# Patient Record
Sex: MALE | Race: WHITE | NOT HISPANIC OR LATINO | Employment: OTHER | URBAN - METROPOLITAN AREA
[De-identification: names, ages, dates, MRNs, and addresses within clinical notes are randomized per-mention and may not be internally consistent; named-entity substitution may affect disease eponyms.]

---

## 2017-01-11 ENCOUNTER — TELEPHONE (OUTPATIENT)
Dept: GASTROENTEROLOGY | Facility: CLINIC | Age: 72
End: 2017-01-11

## 2017-01-11 RX ORDER — POLYETHYLENE GLYCOL 3350, SODIUM CHLORIDE, SODIUM BICARBONATE, POTASSIUM CHLORIDE 420; 11.2; 5.72; 1.48 G/4L; G/4L; G/4L; G/4L
POWDER, FOR SOLUTION ORAL
Qty: 4000 ML | Refills: 0 | Status: SHIPPED | OUTPATIENT
Start: 2017-01-11 | End: 2017-01-23 | Stop reason: SDUPTHER

## 2017-01-11 RX ORDER — SODIUM, POTASSIUM,MAG SULFATES 17.5-3.13G
SOLUTION, RECONSTITUTED, ORAL ORAL
Qty: 354 ML | Refills: 0 | OUTPATIENT
Start: 2017-01-11

## 2017-01-17 ENCOUNTER — TELEPHONE (OUTPATIENT)
Dept: GASTROENTEROLOGY | Facility: CLINIC | Age: 72
End: 2017-01-17

## 2017-01-17 NOTE — TELEPHONE ENCOUNTER
----- Message from Guerda Potts sent at 1/16/2017  2:33 PM CST -----  Contact: Pt.  Needs to cancel or reschedule procedure on 1/26/17 at 10:30 a.m.  Says he needs to see his doctors before doing this.  Please call back at [phone] (Shubham Ann- cousin).  Pt is hearing impaired and cousin will assist.

## 2017-01-18 ENCOUNTER — PROCEDURE VISIT (OUTPATIENT)
Dept: OPHTHALMOLOGY | Facility: CLINIC | Age: 72
End: 2017-01-18
Payer: MEDICARE

## 2017-01-18 DIAGNOSIS — E11.3513 TYPE 2 DIABETES MELLITUS WITH BOTH EYES AFFECTED BY PROLIFERATIVE RETINOPATHY AND MACULAR EDEMA, WITH LONG-TERM CURRENT USE OF INSULIN: Primary | ICD-10-CM

## 2017-01-18 DIAGNOSIS — Z79.4 TYPE 2 DIABETES MELLITUS WITH BOTH EYES AFFECTED BY PROLIFERATIVE RETINOPATHY AND MACULAR EDEMA, WITH LONG-TERM CURRENT USE OF INSULIN: Primary | ICD-10-CM

## 2017-01-18 PROCEDURE — 99499 UNLISTED E&M SERVICE: CPT | Mod: S$PBB,,, | Performed by: OPHTHALMOLOGY

## 2017-01-18 PROCEDURE — 67028 INJECTION EYE DRUG: CPT | Mod: S$PBB,LT,, | Performed by: OPHTHALMOLOGY

## 2017-01-18 PROCEDURE — 67028 INJECTION EYE DRUG: CPT | Mod: PBBFAC,PO,LT | Performed by: OPHTHALMOLOGY

## 2017-01-18 RX ORDER — NEOMYCIN SULFATE, POLYMYXIN B SULFATE AND DEXAMETHASONE 3.5; 10000; 1 MG/ML; [USP'U]/ML; MG/ML
1 SUSPENSION/ DROPS OPHTHALMIC 2 TIMES DAILY
Qty: 5 ML | Refills: 0 | Status: SHIPPED | OUTPATIENT
Start: 2017-01-18 | End: 2017-01-28

## 2017-01-18 RX ADMIN — AFLIBERCEPT 2 MG: 40 INJECTION, SOLUTION INTRAVITREAL at 10:01

## 2017-01-18 NOTE — PROGRESS NOTES
===============================  Mono Bergman,   71 y.o. male   Last visit [facility]: :12/1/2016   Last visit eye dept[facility] 12/1/2016  [facility]:  [facility] distance visual acuity was 20/25 in the right eye and 20/200 in the left eye.   Not recorded        Wearing Rx     Wearing Rx      Sphere Cylinder Axis Add   Right [place] +0.50 025 +2.50   Left [place] +0.50 030 +2.50       Type:  Bifocal               Not recorded        Chief Complaint   Patient presents with    PDR/ME     Ophthalmic Medications     Ophthalmic - Anti-inflammatory, Glucocorticoids Start End    prednisoLONE sodium phosphate (INFLAMASE FORTE) 1 % Drop 5/2/2016     Class: Historical Med         HPI     1.) + DM SINCE 1997  2.) + PDR OU/ CSME OS  Bilateral PRP  PPV OS with Dr. 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 TEARS BID OS  REFRESH GEL BID OS   OMEGA E BID P.O.    EYLEA OS 11/4/16 12/1/16       Last edited by Laya Carvalho on 1/18/2017  8:09 AM.   Read Studies: y  Vitalsy    ________________  1/18/2017  1. Type 2 diabetes mellitus with both eyes affected by proliferative retinopathy and macular edema, with long-term current use of insulin      .  Os can sometimes read TV cations'  Co instantaneous  Sharp pain with tearing  Ad mtx bid in and on ou x 10 days  Oct os no better - worse os.ccavg  ciloxan Antibiotic Drops to be used 4 times daily for 4 days  Watch od dme -   today#4 os   1/18/2017  Diagnosis :  Os dme  Today:   Eylea (afibercept) 2 mg/0.05 ml Intravitreal Injection , OS   Follow up: rtc 1 mo        Call 24/7 for any worsening of vision. Check  OU QD. Gave my home phone number.      Procedure  Note:   OS}  Eylea (afibercept) 2 mg/0.05 ml Intravitreal Injection    I have explained the Risks, Benefits and Alternatives of the procedure in detail.  The patient voices  understanding and all questions have been answered.  The patient agrees to proceed as discussed.  LIDOCAINE 2%  subconj bleb  was used for anesthesia.  Topical betadine was used for antisepsis.  0.05 cc was  injected 3.7 mm from corneal limbus in the inferotemporal quadrant.  Following injection the IOP was less than thirty (<30) by tonopen.  The eye was then thoroughly irrigated with BSS.  Patient tolerated procedure well.  No complications were observed.  The Patient was educated that mild irritation tonight was normal secondary to topical antispsis use.  Pt was advised to call at any time day or night for pain, redness, or any decline in vision. I gave the patient my home number as well as the clinic on call number. Daily visual checks and Amsler grid testing were reviewed.    VALERIANO Tucker MD  Procedure ordered: y  Consent: y  Pre auth: y  MAR:y  Opnote: y  Charge capture:y  Sided procedure note: y         ===========================

## 2017-01-18 NOTE — MR AVS SNAPSHOT
[facility]  [address] [place] [address]  Phone: [phone]  Fax: [phone]                  Mono Bergman   2017 8:15 AM   Procedure visit    Description:  Male : 1945   Provider:  VALERIANO Tucker MD   Department:  [facility]           Reason for Visit     PDR/ME           Diagnoses this Visit        Comments    Type 2 diabetes mellitus with both eyes affected by proliferative retinopathy and macular edema, with long-term current use of insulin    -  Primary            To Do List           Goals (5 Years of Data)     HEMOGLOBIN A1C < 7.0     LDL CHOLESTEROL < 80       Follow-Up and Disposition     Return in about 1 month (around 2017).       These Medications        Disp Refills Start End    neomycin-polymyxin-dexamethasone (MAXITROL) 3.5mg/mL-10,000 unit/mL-0.1 % DrpS 5 mL 0 2017    Place 1 drop into both eyes 2 (two) times daily. Use 1 drop twice a day in and on lids for 10 days - Both Eyes    Pharmacy: [facility] [place] - [address] Ph #: [phone]         [facility]     [facility] Nurse Care Line -  Assistance  Registered nurses in the [facility] provide clinical advisement, health education, appointment booking, and other advisory services.  Call for this free service at [phone].             Medications           Message regarding Medications     Verify the changes and/or additions to your medication regime listed below are the same as discussed with your clinician today.  If any of these changes or additions are incorrect, please notify your healthcare provider.        START taking these NEW medications        Refills    neomycin-polymyxin-dexamethasone (MAXITROL) 3.5mg/mL-10,000 unit/mL-0.1 % DrpS 0    Sig: Place 1 drop into both eyes 2 (two) times daily. Use 1 drop twice a day in and on lids for 10 days    Class: Normal    Route: Both Eyes     "  These medications were administered today        Dose Freq    aflibercept Soln 2 mg 2 mg Clinic/HOD 1 time    Si.05 mLs (2 mg total) by Intravitreal route one time.    Class: Normal    Route: Intravitreal           Verify that the below list of medications is an accurate representation of the medications you are currently taking.  If none reported, the list may be blank. If incorrect, please contact your healthcare provider. Carry this list with you in case of emergency.           Current Medications     ARGININE, L-ARGININE, ORAL Take by mouth.    aspirin 81 MG Chew Take 81 mg by mouth.    aspirin 81 mg Tab Take 81 mg by mouth before breakfast.    atorvastatin (LIPITOR) 80 MG tablet Take 80 mg by mouth once daily.    biotin 5 mg Cap Take 5 mg by mouth before breakfast.    ciprofloxacin HCl (CILOXAN) 0.3 % ophthalmic solution     GAS-X EXTRA STRENGTH 125 mg Cap capsule     insulin needles, disposable, (SURE-FINE PEN NEEDLES) 31 x 3/16 " Ndle Use twice daily to inject insulin    L.acidophilus-B.bifidum&longum 150 mg (3 billion cell) Chew Take 1 tablet by mouth.    lisinopril (PRINIVIL,ZESTRIL) 2.5 MG tablet Take 2.5 mg by mouth once daily.    midodrine (PROAMATINE) 5 MG Tab     moxifloxacin (VIGAMOX) 0.5 % ophthalmic solution Place 1 drop into the right eye every hour.    ondansetron (ZOFRAN) 4 MG tablet Take 4 mg by mouth.    ondansetron (ZOFRAN) 4 MG tablet Take 4 mg by mouth.    pantoprazole (PROTONIX) 20 MG tablet Take by mouth.    pantoprazole (PROTONIX) 20 MG tablet Take by mouth.    peg-electrolyte soln (PEG-3350 WITH FLAVOR PACKS) 420 gram SolR Use as directed    pen needle, diabetic (BD INSULIN PEN NEEDLE UF MINI) 31 gauge x 3/16" Ndle     phenazopyridine (PYRIDIUM) 200 MG tablet     prednisoLONE sodium phosphate (INFLAMASE FORTE) 1 % Drop     rosuvastatin (CRESTOR) 20 MG tablet Take 20 mg by mouth once daily.    simethicone 80 mg Tab Take 80 mg by mouth.    sucralfate (CARAFATE) 100 mg/mL suspension " 500 mg.    sucralfate (CARAFATE) 100 mg/mL suspension Take 500 mg by mouth.    white petrolatum-mineral oil 57.3-42.5% (REFRESH P.M.) 57.3-42.5 % Oint Place into both eyes every evening.    acetaZOLAMIDE (DIAMOX SEQUELS) 500 mg CpSR Take 1 capsule (500 mg total) by mouth 2 (two) times daily.    insulin aspart protamine-insulin aspart (NOVOLOG MIX 70-30 FLEXPEN) 100 unit/mL (70-30) InPn Inject 50 Units into the skin 2 (two) times daily before meals.    neomycin-polymyxin-dexamethasone (MAXITROL) 3.5mg/mL-10,000 unit/mL-0.1 % DrpS Place 1 drop into both eyes 2 (two) times daily. Use 1 drop twice a day in and on lids for 10 days           Clinical Reference Information           Allergies as of 1/18/2017     No Known Allergies      Immunizations Administered on Date of Encounter - 1/18/2017     None      Orders Placed During Today's Visit      Normal Orders This Visit    Prior Authorization Order       Administrations This Visit     aflibercept Soln 2 mg     Admin Date Action Dose Route Administered By             01/18/2017 Given 2 mg Intravitreal VALERIANO Tucker MD

## 2017-01-23 ENCOUNTER — TELEPHONE (OUTPATIENT)
Dept: GASTROENTEROLOGY | Facility: CLINIC | Age: 72
End: 2017-01-23

## 2017-01-23 ENCOUNTER — OFFICE VISIT (OUTPATIENT)
Dept: GASTROENTEROLOGY | Facility: CLINIC | Age: 72
End: 2017-01-23
Payer: MEDICARE

## 2017-01-23 VITALS
HEART RATE: 78 BPM | DIASTOLIC BLOOD PRESSURE: 69 MMHG | BODY MASS INDEX: 33.39 KG/M2 | WEIGHT: 212.75 LBS | HEIGHT: 67 IN | SYSTOLIC BLOOD PRESSURE: 120 MMHG

## 2017-01-23 DIAGNOSIS — K59.09 CONSTIPATION, CHRONIC: ICD-10-CM

## 2017-01-23 DIAGNOSIS — Z86.010 HISTORY OF COLON POLYPS: Primary | ICD-10-CM

## 2017-01-23 PROCEDURE — 99215 OFFICE O/P EST HI 40 MIN: CPT | Mod: PBBFAC | Performed by: PHYSICIAN ASSISTANT

## 2017-01-23 PROCEDURE — 99203 OFFICE O/P NEW LOW 30 MIN: CPT | Mod: S$PBB,,, | Performed by: PHYSICIAN ASSISTANT

## 2017-01-23 PROCEDURE — 99999 PR PBB SHADOW E&M-EST. PATIENT-LVL V: CPT | Mod: PBBFAC,,, | Performed by: PHYSICIAN ASSISTANT

## 2017-01-23 RX ORDER — POLYETHYLENE GLYCOL 3350 17 G/17G
17 POWDER, FOR SOLUTION ORAL DAILY
Qty: 510 G | Refills: 3 | Status: SHIPPED | OUTPATIENT
Start: 2017-01-23 | End: 2019-09-12 | Stop reason: SDUPTHER

## 2017-01-23 RX ORDER — POLYETHYLENE GLYCOL 3350, SODIUM CHLORIDE, SODIUM BICARBONATE, POTASSIUM CHLORIDE 420; 11.2; 5.72; 1.48 G/4L; G/4L; G/4L; G/4L
POWDER, FOR SOLUTION ORAL
Qty: 4000 ML | Refills: 0 | Status: ON HOLD | OUTPATIENT
Start: 2017-01-23 | End: 2017-02-03 | Stop reason: CLARIF

## 2017-01-23 NOTE — MR AVS SNAPSHOT
[facility]  [address] [facility] [address]  Phone: [phone]  Fax: [phone]                  Mono Bergman   2017 9:30 AM   Office Visit    Description:  Male : 1945   Provider:  Wes Caicedo PA-C   Department:  [facility]           Reason for Visit     Colonoscopy           Diagnoses this Visit        Comments    History of colon polyps    -  Primary     Constipation, chronic                To Do List           Future Appointments        Provider Department Dept Phone    2017 10:15 AM VALERIANO Tucker MD [facility] - Ophthalmology [phone]      Goals (5 Years of Data)     HEMOGLOBIN A1C < 7.0     LDL CHOLESTEROL < 80       Follow-Up and Disposition     Return in about 4 weeks (around 2017).       These Medications        Disp Refills Start End    peg-electrolyte soln (PEG-3350 WITH FLAVOR PACKS) 420 gram SolR 4000 mL 0 2017     Use as directed    Pharmacy: [facility]/pharmacy #[id number] - [place] - [address] [facility] Ph #: [phone]       polyethylene glycol (GLYCOLAX) 17 gram/dose powder 510 g 3 2017     Take 17 g by mouth once daily. - Oral    Pharmacy: [facility]/pharmacy #[id number] - [place] - [address] [facility] Ph #: [phone]         [facility]     [facility] Care Line - / Assistance  Registered nurses in the [facility] Center provide clinical advisement, health education, appointment booking, and other advisory services.  Call for this free service at [phone].             Medications           Message regarding Medications     Verify the changes and/or additions to your medication regime listed below are the same as discussed with your clinician today.  If any of these changes or additions are incorrect, please notify your healthcare provider.        START taking these NEW medications        Refills    polyethylene glycol (GLYCOLAX) 17  "gram/dose powder 3    Sig: Take 17 g by mouth once daily.    Class: Print    Route: Oral           Verify that the below list of medications is an accurate representation of the medications you are currently taking.  If none reported, the list may be blank. If incorrect, please contact your healthcare provider. Carry this list with you in case of emergency.           Current Medications     ARGININE, L-ARGININE, ORAL Take by mouth.    aspirin 81 MG Chew Take 81 mg by mouth.    aspirin 81 mg Tab Take 81 mg by mouth before breakfast.    atorvastatin (LIPITOR) 80 MG tablet Take 80 mg by mouth once daily.    biotin 5 mg Cap Take 5 mg by mouth before breakfast.    ciprofloxacin HCl (CILOXAN) 0.3 % ophthalmic solution     GAS-X EXTRA STRENGTH 125 mg Cap capsule     insulin needles, disposable, (SURE-FINE PEN NEEDLES) 31 x 3/16 " Ndle Use twice daily to inject insulin    L.acidophilus-B.bifidum&longum 150 mg (3 billion cell) Chew Take 1 tablet by mouth.    lisinopril (PRINIVIL,ZESTRIL) 2.5 MG tablet Take 2.5 mg by mouth once daily.    midodrine (PROAMATINE) 5 MG Tab     moxifloxacin (VIGAMOX) 0.5 % ophthalmic solution Place 1 drop into the right eye every hour.    neomycin-polymyxin-dexamethasone (MAXITROL) 3.5mg/mL-10,000 unit/mL-0.1 % DrpS Place 1 drop into both eyes 2 (two) times daily. Use 1 drop twice a day in and on lids for 10 days    ondansetron (ZOFRAN) 4 MG tablet Take 4 mg by mouth.    ondansetron (ZOFRAN) 4 MG tablet Take 4 mg by mouth.    pantoprazole (PROTONIX) 20 MG tablet Take by mouth.    pantoprazole (PROTONIX) 20 MG tablet Take by mouth.    peg-electrolyte soln (PEG-3350 WITH FLAVOR PACKS) 420 gram SolR Use as directed    pen needle, diabetic (BD INSULIN PEN NEEDLE UF MINI) 31 gauge x 3/16" Ndle     phenazopyridine (PYRIDIUM) 200 MG tablet     prednisoLONE sodium phosphate (INFLAMASE FORTE) 1 % Drop     rosuvastatin (CRESTOR) 20 MG tablet Take 20 mg by mouth once daily.    simethicone 80 mg Tab Take 80 " "mg by mouth.    sucralfate (CARAFATE) 100 mg/mL suspension 500 mg.    white petrolatum-mineral oil 57.3-42.5% (REFRESH P.M.) 57.3-42.5 % Oint Place into both eyes every evening.    acetaZOLAMIDE (DIAMOX SEQUELS) 500 mg CpSR Take 1 capsule (500 mg total) by mouth 2 (two) times daily.    insulin aspart protamine-insulin aspart (NOVOLOG MIX 70-30 FLEXPEN) 100 unit/mL (70-30) InPn Inject 50 Units into the skin 2 (two) times daily before meals.    polyethylene glycol (GLYCOLAX) 17 gram/dose powder Take 17 g by mouth once daily.    sucralfate (CARAFATE) 100 mg/mL suspension Take 500 mg by mouth.           Clinical Reference Information           Vital Signs - Last Recorded  Most recent update: 1/23/2017  9:42 AM by Rhonda García MA    BP Pulse Ht Wt BMI    120/69 78 5' 7" (1.702 m) 96.5 kg (212 lb 11.9 oz) 33.32 kg/m2      Blood Pressure          Most Recent Value    BP  120/69      Allergies as of 1/23/2017     No Known Allergies      Immunizations Administered on Date of Encounter - 1/23/2017     None      Instructions    Start Miralax once daily to help improve bowel habits.          "

## 2017-01-23 NOTE — PROGRESS NOTES
Subjective:       Patient ID: Mono Bergman is a 71 y.o. male.    Chief Complaint: Colonoscopy    HPI   The patient presents to the GI clinic today for [facility] The patient had a colonoscopy with polypectomy in 2013. A three year repeat was recommended. The patient reports problems with constipation. He has a BM every 3-4 days. When he finally goes, it is like a plug followed by loose stools. He denies hematochezia or melena. He doesn't take anything to help his bowel habits. He denies weight loss, change in appetite, abdominal pain, nausea, vomiting, heartburn or dysphagia. He denies a family history of colon cancer.    Patient reported feeling like his BG level was low. We checked it and it was 69. He was given 4 ounces of juice. His repeat BG lever about 20 minutes later was 198.     Review of Systems   Constitutional: Negative for appetite change, fatigue and fever.   HENT: Positive for hearing loss. Negative for sore throat.    Eyes: Positive for visual disturbance.   Respiratory: Positive for cough. Negative for choking and shortness of breath.    Cardiovascular: Negative for chest pain and palpitations.   Gastrointestinal:        As per HPI.   Genitourinary: Positive for frequency. Negative for difficulty urinating, dysuria and hematuria.   Musculoskeletal: Negative for arthralgias and back pain.   Skin: Negative for color change and rash.   Neurological: Positive for numbness. Negative for dizziness, seizures, syncope, weakness and headaches.   Hematological: Does not bruise/bleed easily.   Psychiatric/Behavioral: The patient is not nervous/anxious.        Objective:      Physical Exam   Constitutional: He is oriented to person, place, and time. He appears well-developed and well-nourished.   HENT:   Head: Normocephalic and atraumatic.   Eyes: EOM are normal.   Neck: Normal range of motion. Neck supple.   Cardiovascular: Normal rate, regular rhythm and normal  heart sounds.    No murmur heard.  Pulmonary/Chest: Effort normal and breath sounds normal. No respiratory distress. He has no wheezes.   Abdominal: Soft. Bowel sounds are normal. He exhibits no distension and no mass. There is no hepatomegaly. There is no tenderness.   Musculoskeletal: He exhibits no edema.   Neurological: He is alert and oriented to person, place, and time. No cranial nerve deficit. Gait abnormal.   Skin: Skin is warm and dry. No rash noted.   Psychiatric: He has a normal mood and affect.       Assessment:       1. History of colon polyps    2. Constipation, chronic        Plan:       1. Colonoscopy.   The risks, benefits, alternatives and possible complications of the above procedure(s) were discussed with the patient to include but not limited to infection, bleeding, heart complications, respiratory complications, or perforation which may require surgical intervention. The patient's questions were answered. The patient verbally reported understanding of the discussion.  2. A Nulytely prescription was printed for the patient.   3. I recommend starting Miralax once daily for constipation.   4. Further recommendations after above. Follow up with PCP as previously scheduled.     Thank you for the opportunity to participate in the care of this patient. This consult was designated to me by my supervising physician. A report will be sent to the referring provider.   Wes Caicedo PA-C.

## 2017-01-23 NOTE — TELEPHONE ENCOUNTER
Went in to room patient and he explained that i needed to get hi some juice because his blood sugar was 69, he was shakey and sweaty. We gave him 4 oz of apple juice and rechecked his blood sugar an it was 198.

## 2017-02-03 ENCOUNTER — SURGERY (OUTPATIENT)
Age: 72
End: 2017-02-03

## 2017-02-03 ENCOUNTER — ANESTHESIA (OUTPATIENT)
Dept: ENDOSCOPY | Facility: HOSPITAL | Age: 72
End: 2017-02-03
Payer: COMMERCIAL

## 2017-02-03 ENCOUNTER — ANESTHESIA EVENT (OUTPATIENT)
Dept: ENDOSCOPY | Facility: HOSPITAL | Age: 72
End: 2017-02-03
Payer: COMMERCIAL

## 2017-02-03 VITALS — RESPIRATION RATE: 23 BRPM

## 2017-02-03 PROBLEM — K63.5 COLON POLYP: Status: ACTIVE | Noted: 2017-02-03

## 2017-02-03 PROCEDURE — 25000003 PHARM REV CODE 250: Performed by: NURSE ANESTHETIST, CERTIFIED REGISTERED

## 2017-02-03 PROCEDURE — 63600175 PHARM REV CODE 636 W HCPCS: Performed by: NURSE ANESTHETIST, CERTIFIED REGISTERED

## 2017-02-03 RX ORDER — PROPOFOL 10 MG/ML
VIAL (ML) INTRAVENOUS
Status: DISCONTINUED | OUTPATIENT
Start: 2017-02-03 | End: 2017-02-03

## 2017-02-03 RX ORDER — LIDOCAINE HYDROCHLORIDE 10 MG/ML
INJECTION INFILTRATION; PERINEURAL
Status: DISCONTINUED | OUTPATIENT
Start: 2017-02-03 | End: 2017-02-03

## 2017-02-03 RX ADMIN — PROPOFOL 100 MG: 10 INJECTION, EMULSION INTRAVENOUS at 01:02

## 2017-02-03 RX ADMIN — PROPOFOL 50 MG: 10 INJECTION, EMULSION INTRAVENOUS at 01:02

## 2017-02-03 RX ADMIN — EPHEDRINE SULFATE 25 MG: 50 INJECTION, SOLUTION INTRAMUSCULAR; INTRAVENOUS; SUBCUTANEOUS at 01:02

## 2017-02-03 RX ADMIN — LIDOCAINE HYDROCHLORIDE 40 MG: 10 INJECTION, SOLUTION INFILTRATION; PERINEURAL at 01:02

## 2017-02-03 NOTE — ANESTHESIA RELEASE NOTE
"Anesthesia Release from PACU Note    Patient: Mono Bergman    Procedure(s) Performed: Procedure(s) (LRB):  COLONOSCOPY (N/A)    Anesthesia type: MAC    Post pain: Adequate analgesia    Post assessment: no apparent anesthetic complications    Last Vitals:   Visit Vitals    BP (!) 149/63    Pulse 98    Temp 37 °C (98.6 °F)    Resp 14    Ht 5' 8" (1.727 m)    Wt 95.7 kg (211 lb)    SpO2 98%    BMI 32.08 kg/m2       Post vital signs: stable    Level of consciousness: awake    Nausea/Vomiting: no nausea/no vomiting    Complications: none    Airway Patency: patent    Respiratory: unassisted    Cardiovascular: stable and blood pressure at baseline    Hydration: euvolemic  "

## 2017-02-03 NOTE — ANESTHESIA PREPROCEDURE EVALUATION
02/03/2017  Mono Bergman is a 71 y.o., male.    [facility] Anesthesia Evaluation    I have reviewed the Patient Summary Reports.    I have reviewed the Nursing Notes.   I have reviewed the Medications.     Review of Systems  Anesthesia Hx:  No problems with previous Anesthesia    Social:  Non-Smoker    Hematology/Oncology:  Hematology Normal   Oncology Normal     EENT/Dental:EENT/Dental Normal   Cardiovascular:   Hypertension, well controlled hyperlipidemia    Pulmonary:  Pulmonary Normal    Renal/:  Renal/ Normal     Hepatic/GI:   GERD, poorly controlled  Bowel Conditions:  Bowel Neoplasm:, Adenomatous Polyp    Musculoskeletal:  Musculoskeletal Normal    Neurological:  Neurology Normal    Endocrine:   Diabetes, well controlled, type 2  Diabetes , Complications include Diabetic Retinopathy  Metabolic Disorders, Obesity / BMI > 30  Dermatological:  Skin Normal    Psych:  Psychiatric Normal           Physical Exam  General:  Well nourished, Obesity    Airway/Jaw/Neck:  Airway Findings: Mouth Opening: Normal Tongue: Normal       Chest/Lungs:  Chest/Lungs Findings: Clear to auscultation     Heart/Vascular:  Heart Findings: Rate: Normal             Anesthesia Plan  Type of Anesthesia, risks & benefits discussed:  Anesthesia Type:  MAC  Patient's Preference:   Intra-op Monitoring Plan:   Intra-op Monitoring Plan Comments:   Post Op Pain Control Plan:   Post Op Pain Control Plan Comments:   Induction:   IV  Beta Blocker:  Patient is not currently on a Beta-Blocker (No further documentation required).       Informed Consent: Patient understands risks and agrees with Anesthesia plan.  Questions answered. Anesthesia consent signed with patient.  ASA Score: 2     Day of Surgery Review of History & Physical: I have interviewed and examined the patient. I have reviewed the patient's H&P dated:  There are no  significant changes.          Ready For Surgery From Anesthesia Perspective.

## 2017-02-03 NOTE — TRANSFER OF CARE
"Anesthesia Transfer of Care Note    Patient: Mono Bergman    Procedure(s) Performed: Procedure(s) (LRB):  COLONOSCOPY (N/A)    Patient location: PACU    Anesthesia Type: MAC    Transport from OR: Transported from OR on room air with adequate spontaneous ventilation    Post pain: adequate analgesia    Post assessment: no apparent anesthetic complications    Post vital signs: stable    Level of consciousness: awake    Nausea/Vomiting: no nausea/vomiting    Complications: none          Last vitals:   Visit Vitals    BP (!) 149/63    Pulse 98    Temp 37 °C (98.6 °F)    Resp 14    Ht 5' 8" (1.727 m)    Wt 95.7 kg (211 lb)    SpO2 98%    BMI 32.08 kg/m2     "

## 2017-02-03 NOTE — ANESTHESIA POSTPROCEDURE EVALUATION
"Anesthesia Post Evaluation    Patient: Mono Bergman    Procedure(s) Performed: Procedure(s) (LRB):  COLONOSCOPY (N/A)    Final Anesthesia Type: MAC  Patient location during evaluation: PACU  Patient participation: Yes- Able to Participate  Level of consciousness: awake and alert  Post-procedure vital signs: reviewed and stable  Pain management: adequate  Airway patency: patent  PONV status at discharge: No PONV  Anesthetic complications: no      Cardiovascular status: blood pressure returned to baseline  Respiratory status: unassisted  Hydration status: euvolemic  Follow-up not needed.        Visit Vitals    BP (!) 149/63    Pulse 98    Temp 37 °C (98.6 °F)    Resp 14    Ht 5' 8" (1.727 m)    Wt 95.7 kg (211 lb)    SpO2 98%    BMI 32.08 kg/m2       Pain/Jj Score: Pain Assessment Performed: Yes (2/3/2017  1:40 PM)  Presence of Pain: non-verbal indicators absent (2/3/2017  1:40 PM)  Jj Score: 9 (2/3/2017  1:40 PM)      "

## 2017-02-22 ENCOUNTER — PROCEDURE VISIT (OUTPATIENT)
Dept: OPHTHALMOLOGY | Facility: CLINIC | Age: 72
End: 2017-02-22
Payer: MEDICARE

## 2017-02-22 DIAGNOSIS — Z79.4 TYPE 2 DIABETES MELLITUS WITH BOTH EYES AFFECTED BY PROLIFERATIVE RETINOPATHY AND MACULAR EDEMA, WITH LONG-TERM CURRENT USE OF INSULIN: Primary | ICD-10-CM

## 2017-02-22 DIAGNOSIS — E11.3513 TYPE 2 DIABETES MELLITUS WITH BOTH EYES AFFECTED BY PROLIFERATIVE RETINOPATHY AND MACULAR EDEMA, WITH LONG-TERM CURRENT USE OF INSULIN: Primary | ICD-10-CM

## 2017-02-22 PROCEDURE — 99499 UNLISTED E&M SERVICE: CPT | Mod: S$PBB,,, | Performed by: OPHTHALMOLOGY

## 2017-02-22 PROCEDURE — 92134 CPTRZ OPH DX IMG PST SGM RTA: CPT | Mod: PBBFAC,PO | Performed by: OPHTHALMOLOGY

## 2017-02-22 PROCEDURE — 67028 INJECTION EYE DRUG: CPT | Mod: S$PBB,LT,, | Performed by: OPHTHALMOLOGY

## 2017-02-22 PROCEDURE — 67028 INJECTION EYE DRUG: CPT | Mod: PBBFAC,PO,LT | Performed by: OPHTHALMOLOGY

## 2017-02-22 RX ORDER — NEOMYCIN SULFATE, POLYMYXIN B SULFATE AND DEXAMETHASONE 3.5; 10000; 1 MG/ML; [USP'U]/ML; MG/ML
SUSPENSION/ DROPS OPHTHALMIC
COMMUNITY
Start: 2017-01-18 | End: 2017-04-05 | Stop reason: ALTCHOICE

## 2017-02-22 RX ORDER — INSULIN ASPART 100 [IU]/ML
INJECTION, SOLUTION INTRAVENOUS; SUBCUTANEOUS
COMMUNITY
Start: 2017-02-06 | End: 2019-10-03

## 2017-02-22 RX ADMIN — AFLIBERCEPT 2 MG: 40 INJECTION, SOLUTION INTRAVITREAL at 03:02

## 2017-02-22 NOTE — MR AVS SNAPSHOT
[facility] - Ophthalmology  [address] [facility] [place] [address]  Phone: [phone]  Fax: [phone]                  Mono Bergman   2017 10:15 AM   Procedure visit    Description:  Male : 1945   Provider:  VALERIANO Tucker MD   Department:  [facility] - Ophthalmology           Reason for Visit     PDR/ME           Diagnoses this Visit        Comments    Type 2 diabetes mellitus with both eyes affected by proliferative retinopathy and macular edema, with long-term current use of insulin    -  Primary            To Do List           Goals (5 Years of Data)     HEMOGLOBIN A1C < 7.0     LDL CHOLESTEROL < 80       Follow-Up and Disposition     Return in about 1 month (around 3/22/2017).      [facility]     [facility] -  Assistance  Registered nurses in the [facility] Call Center provide clinical advisement, health education, appointment booking, and other advisory services.  Call for this free service at [phone].             Medications           Message regarding Medications     Verify the changes and/or additions to your medication regime listed below are the same as discussed with your clinician today.  If any of these changes or additions are incorrect, please notify your healthcare provider.        These medications were administered today        Dose Freq    aflibercept Soln 2 mg 2 mg Clinic/HOD 1 time    Si.05 mLs (2 mg total) by Intravitreal route one time.    Class: Normal    Route: Intravitreal           Verify that the below list of medications is an accurate representation of the medications you are currently taking.  If none reported, the list may be blank. If incorrect, please contact your healthcare provider. Carry this list with you in case of emergency.           Current Medications     insulin aspart (NOVOLOG) 100 unit/mL InPn pen Inject 6 units into skin 5 to 10 minutes before breakfast    neomycin-polymyxin-dexamethasone (MAXITROL)  "3.5mg/mL-10,000 unit/mL-0.1 % DrpS     acetaZOLAMIDE (DIAMOX SEQUELS) 500 mg CpSR Take 1 capsule (500 mg total) by mouth 2 (two) times daily.    ARGININE, L-ARGININE, ORAL Take by mouth.    aspirin 81 mg Tab Take 81 mg by mouth before breakfast.    atorvastatin (LIPITOR) 80 MG tablet Take 80 mg by mouth once daily.    biotin 5 mg Cap Take 5 mg by mouth before breakfast.    ciprofloxacin HCl (CILOXAN) 0.3 % ophthalmic solution     ergocalciferol (VITAMIN D2) 50,000 unit Cap Take 50,000 Units by mouth every 7 days.    fish oil-omega-3 fatty acids 300-1,000 mg capsule Take 1 g by mouth 2 (two) times daily.    GAS-X EXTRA STRENGTH 125 mg Cap capsule     insulin aspart protamine-insulin aspart (NOVOLOG MIX 70-30 FLEXPEN) 100 unit/mL (70-30) InPn Inject 50 Units into the skin 2 (two) times daily before meals.    insulin glargine (LANTUS) 100 unit/mL injection Inject 80 Units into the skin every evening.    insulin needles, disposable, (SURE-FINE PEN NEEDLES) 31 x 3/16 " Ndle Use twice daily to inject insulin    L.acidophilus-B.bifidum&longum 150 mg (3 billion cell) Chew Take 1 tablet by mouth.    lisinopril (PRINIVIL,ZESTRIL) 2.5 MG tablet Take 2.5 mg by mouth once daily.    metformin (GLUCOPHAGE) 500 MG tablet Take 500 mg by mouth once.    midodrine (PROAMATINE) 5 MG Tab     moxifloxacin (VIGAMOX) 0.5 % ophthalmic solution Place 1 drop into the right eye every hour.    OMEGA-3S/DHA/EPA/FISH OIL (OMEGA 3 ORAL) Take by mouth.    ondansetron (ZOFRAN) 4 MG tablet Take 4 mg by mouth.    pantoprazole (PROTONIX) 20 MG tablet Take by mouth.    pen needle, diabetic (BD INSULIN PEN NEEDLE UF MINI) 31 gauge x 3/16" Ndle     polyethylene glycol (GLYCOLAX) 17 gram/dose powder Take 17 g by mouth once daily.    prednisoLONE sodium phosphate (INFLAMASE FORTE) 1 % Drop     rosuvastatin (CRESTOR) 20 MG tablet Take 20 mg by mouth once daily.    simethicone 80 mg Tab Take 80 mg by mouth.    SODIUM CL/POTASSIUM CHLORIDE (THERMOTABS ORAL) " Take 5 Units by mouth as needed.    white petrolatum-mineral oil 57.3-42.5% (REFRESH P.M.) 57.3-42.5 % Oint Place into both eyes every evening.           Clinical Reference Information           Allergies as of 2/22/2017     No Known Allergies      Immunizations Administered on Date of Encounter - 2/22/2017     None      Orders Placed During Today's Visit      Normal Orders This Visit    Posterior Segment OCT Retina-Both eyes     Prior Authorization Order       Language Assistance Services     ATTENTION: Language assistance services are available, free of charge. Please call [phone].      ATENCIÓN: Si habla iesha, tiene a grossman disposición servicios gratuitos de asistencia lingüística. Llame al [phone].     CHÚ Ý: N?u b?n nói Ti?ng Vi?t, có các d?ch v? h? tr? ngôn ng? mi?n phí dành cho b?n. G?i s? [phone].         [facility] - Ophthalmology complies with applicable Federal civil rights laws and does not discriminate on the basis of race, color, national origin, age, disability, or sex.

## 2017-02-22 NOTE — PROGRESS NOTES
"    ===============================  Mono Bergman,   71 y.o. male   Last visit [facility]: :1/18/2017   [place] [place]  [facility]  [place] visual acuity was 20/40 in the right eye and 20/200 in the left eye.   Not recorded         Not recorded         Not recorded        Chief Complaint   Patient presents with    PDR/ME     EYLEA OS     Ophthalmic Medications     Ophthalmic - Anti-inflammatory, Glucocorticoids Start End    prednisoLONE sodium phosphate (INFLAMASE FORTE) 1 % Drop 5/2/2016     Class: Historical Med         HPI     PDR/ME    Additional comments: EYLEA OS           Comments   1.) + DM SINCE 1997  2.) + PDR OU/ CSME OS  Bilateral PRP  PPV OS with Dr. 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 TEARS BID OS  REFRESH GEL BID OS   OMEGA E BID P.O.    EYLEA OS 11/4/16 12/1/16 1/18/17       Last edited by Laya Carvalho on 2/22/2017 10:20 AM. (History)      Read Studies: y  Vitalsy    ________________  2/22/2017  1. Type 2 diabetes mellitus with both eyes affected by proliferative retinopathy and macular edema, with long-term current use of insulin      .  od sl worse dme 20 /20. 40  Os moderate woresing  "i do get a lot of salt"  i take salt because i take a lot of salt  "i take a salt pill"  Ou dme  Discuss w internist OTC salt pill bid  He does this bc coner w low BP  Possibly confusing low bp w low glucose  He feesl there is a difference  [facility]  Dr jimenez civilian doctor  Hold off on that salt pill for the next month    2/22/2017  Diagnosis :  Os dme   Today:   Eylea (afibercept) 2 mg/0.05 ml Intravitreal Injection , OS   Follow up: rtc 1 mo - if od worse         Call 24/7 for any worsening of vision. Check  OU QD. Gave my home phone number.      Procedure  Note:   OD}  Eylea (afibercept) 2 " mg/0.05 ml Intravitreal Injection    I have explained the Risks, Benefits and Alternatives of the procedure in detail.  The patient voices understanding and all questions have been answered.  The patient agrees to proceed as discussed.  LIDOCAINE 2%  subconj bleb  was used for anesthesia.  Topical betadine was used for antisepsis.  0.05 cc was  injected 3.7 mm from corneal limbus in the inferotemporal quadrant.  Following injection the IOP was less than thirty (<30) by tonopen.  The eye was then thoroughly irrigated with BSS.  Patient tolerated procedure well.  No complications were observed.  The Patient was educated that mild irritation tonight was normal secondary to topical antispsis use.  Pt was advised to call at any time day or night for pain, redness, or any decline in vision. I gave the patient my home number as well as the clinic on call number. Daily visual checks and Amsler grid testing were reviewed.  ciloxan Antibiotic Drops to be used 4 times daily for 4 days  VALERIANO Tucker MD  Procedure ordered: y  Consent: y  Pre auth: y  MAR:y  Opnote: y  Charge capture:y  Sided procedure note: y           ===========================

## 2017-03-09 ENCOUNTER — TELEPHONE (OUTPATIENT)
Dept: OPHTHALMOLOGY | Facility: CLINIC | Age: 72
End: 2017-03-09

## 2017-03-09 NOTE — TELEPHONE ENCOUNTER
----- Message from Gabriella Dick MA sent at 3/9/2017  8:04 AM CST -----  Contact: Pt  Mr. Bergman would like to Reschedule his PROC appt from March 22 to 03/29/17.  Please call the Pt and leave a message as he is hearing impaired.  Pt was informed to expect a call on Monday, March 13.    Thank you,  Gabriella

## 2017-03-09 NOTE — TELEPHONE ENCOUNTER
Left message that someone from Dr. Mendez office will call back by Monday to reschedule appointment

## 2017-04-05 ENCOUNTER — PROCEDURE VISIT (OUTPATIENT)
Dept: OPHTHALMOLOGY | Facility: CLINIC | Age: 72
End: 2017-04-05
Payer: MEDICARE

## 2017-04-05 DIAGNOSIS — E11.3513 TYPE 2 DIABETES MELLITUS WITH BOTH EYES AFFECTED BY PROLIFERATIVE RETINOPATHY AND MACULAR EDEMA, WITH LONG-TERM CURRENT USE OF INSULIN: Primary | ICD-10-CM

## 2017-04-05 DIAGNOSIS — Z79.4 TYPE 2 DIABETES MELLITUS WITH BOTH EYES AFFECTED BY PROLIFERATIVE RETINOPATHY AND MACULAR EDEMA, WITH LONG-TERM CURRENT USE OF INSULIN: Primary | ICD-10-CM

## 2017-04-05 PROCEDURE — 67028 INJECTION EYE DRUG: CPT | Mod: PBBFAC,PO,RT | Performed by: OPHTHALMOLOGY

## 2017-04-05 PROCEDURE — 92134 CPTRZ OPH DX IMG PST SGM RTA: CPT | Mod: PBBFAC,PO | Performed by: OPHTHALMOLOGY

## 2017-04-05 PROCEDURE — 67028 INJECTION EYE DRUG: CPT | Mod: S$PBB,RT,, | Performed by: OPHTHALMOLOGY

## 2017-04-05 PROCEDURE — 99499 UNLISTED E&M SERVICE: CPT | Mod: S$PBB,,, | Performed by: OPHTHALMOLOGY

## 2017-04-05 RX ORDER — CIPROFLOXACIN HYDROCHLORIDE 3 MG/ML
1 SOLUTION/ DROPS OPHTHALMIC 4 TIMES DAILY
Qty: 5 ML | Refills: 0 | Status: SHIPPED | OUTPATIENT
Start: 2017-04-05 | End: 2017-04-09

## 2017-04-05 RX ORDER — CIPROFLOXACIN HYDROCHLORIDE 3 MG/ML
1 SOLUTION/ DROPS OPHTHALMIC 4 TIMES DAILY
Qty: 5 ML | Refills: 0 | Status: SHIPPED | OUTPATIENT
Start: 2017-04-05 | End: 2017-04-05 | Stop reason: SDUPTHER

## 2017-04-05 RX ORDER — KETOROLAC TROMETHAMINE 5 MG/ML
1 SOLUTION OPHTHALMIC 4 TIMES DAILY
Qty: 5 ML | Refills: 2 | Status: SHIPPED | OUTPATIENT
Start: 2017-04-05 | End: 2018-04-05

## 2017-04-05 RX ORDER — KETOROLAC TROMETHAMINE 5 MG/ML
1 SOLUTION OPHTHALMIC 4 TIMES DAILY
Qty: 5 ML | Refills: 2 | Status: SHIPPED | OUTPATIENT
Start: 2017-04-05 | End: 2017-04-05 | Stop reason: SDUPTHER

## 2017-04-05 RX ORDER — PREDNISOLONE ACETATE 10 MG/ML
1 SUSPENSION/ DROPS OPHTHALMIC 4 TIMES DAILY
Qty: 10 ML | Refills: 2 | Status: SHIPPED | OUTPATIENT
Start: 2017-04-05 | End: 2017-04-05 | Stop reason: SDUPTHER

## 2017-04-05 RX ORDER — PREDNISOLONE ACETATE 10 MG/ML
1 SUSPENSION/ DROPS OPHTHALMIC 4 TIMES DAILY
Qty: 10 ML | Refills: 2 | Status: SHIPPED | OUTPATIENT
Start: 2017-04-05 | End: 2018-04-05

## 2017-04-05 RX ADMIN — AFLIBERCEPT 2 MG: 40 INJECTION, SOLUTION INTRAVITREAL at 10:04

## 2017-04-05 NOTE — MR AVS SNAPSHOT
[facility] - Ophthalmology  [address] [facility] [place] [address]  Phone: [phone]  Fax: [phone]                  Mono Bergman   2017 2:45 PM   Procedure visit    Description:  Male : 1945   Provider:  VALERIANO Tucker MD   Department:  [facility] - Ophthalmology           Reason for Visit     PDR/ME                To Do List           Future Appointments        Provider Department Dept Phone    2017 2:45 PM VALERIANO Tucker MD [facility] - [facility] [phone]      Goals (5 Years of Data)     HEMOGLOBIN A1C < 7.0     LDL CHOLESTEROL < 80       Follow-Up and Disposition     Return in about 1 month (around 2017).      [facility] On Call     [facility] On Call Nurse Care Line -  Assistance  Unless otherwise directed by your provider, please contact [facility], our nurse care line that is available for  assistance.     Registered nurses in the [facility] Center provide: appointment scheduling, clinical advisement, health education, and other advisory services.  Call: [phone] (toll free)               Medications           Message regarding Medications     Verify the changes and/or additions to your medication regime listed below are the same as discussed with your clinician today.  If any of these changes or additions are incorrect, please notify your healthcare provider.             Verify that the below list of medications is an accurate representation of the medications you are currently taking.  If none reported, the list may be blank. If incorrect, please contact your healthcare provider. Carry this list with you in case of emergency.           Current Medications     ARGININE, L-ARGININE, ORAL Take by mouth.    aspirin 81 mg Tab Take 81 mg by mouth before breakfast.    atorvastatin (LIPITOR) 80 MG tablet Take 80 mg by mouth once daily.    biotin 5 mg Cap Take 5 mg by mouth before breakfast.    ciprofloxacin HCl (CILOXAN) 0.3 % ophthalmic solution      "ergocalciferol (VITAMIN D2) 50,000 unit Cap Take 50,000 Units by mouth every 7 days.    fish oil-omega-3 fatty acids 300-1,000 mg capsule Take 1 g by mouth 2 (two) times daily.    GAS-X EXTRA STRENGTH 125 mg Cap capsule     insulin aspart (NOVOLOG) 100 unit/mL InPn pen Inject 6 units into skin 5 to 10 minutes before breakfast    insulin glargine (LANTUS) 100 unit/mL injection Inject 80 Units into the skin every evening.    insulin needles, disposable, (SURE-FINE PEN NEEDLES) 31 x 3/16 " Ndle Use twice daily to inject insulin    L.acidophilus-B.bifidum&longum 150 mg (3 billion cell) Chew Take 1 tablet by mouth.    lisinopril (PRINIVIL,ZESTRIL) 2.5 MG tablet Take 2.5 mg by mouth once daily.    metformin (GLUCOPHAGE) 500 MG tablet Take 500 mg by mouth once.    midodrine (PROAMATINE) 5 MG Tab     moxifloxacin (VIGAMOX) 0.5 % ophthalmic solution Place 1 drop into the right eye every hour.    neomycin-polymyxin-dexamethasone (MAXITROL) 3.5mg/mL-10,000 unit/mL-0.1 % DrpS     OMEGA-3S/DHA/EPA/FISH OIL (OMEGA 3 ORAL) Take by mouth.    ondansetron (ZOFRAN) 4 MG tablet Take 4 mg by mouth.    pantoprazole (PROTONIX) 20 MG tablet Take by mouth.    pen needle, diabetic (BD INSULIN PEN NEEDLE UF MINI) 31 gauge x 3/16" Ndle     polyethylene glycol (GLYCOLAX) 17 gram/dose powder Take 17 g by mouth once daily.    prednisoLONE sodium phosphate (INFLAMASE FORTE) 1 % Drop     rosuvastatin (CRESTOR) 20 MG tablet Take 20 mg by mouth once daily.    simethicone 80 mg Tab Take 80 mg by mouth.    SODIUM CL/POTASSIUM CHLORIDE (THERMOTABS ORAL) Take 5 Units by mouth as needed.    white petrolatum-mineral oil 57.3-42.5% (REFRESH P.M.) 57.3-42.5 % Oint Place into both eyes every evening.    acetaZOLAMIDE (DIAMOX SEQUELS) 500 mg CpSR Take 1 capsule (500 mg total) by mouth 2 (two) times daily.    insulin aspart protamine-insulin aspart (NOVOLOG MIX 70-30 FLEXPEN) 100 unit/mL (70-30) InPn Inject 50 Units into the skin 2 (two) times daily before " meals.           Clinical Reference Information           Allergies as of 4/5/2017     No Known Allergies      Immunizations Administered on Date of Encounter - 4/5/2017     None      Language Assistance Services     ATTENTION: Language assistance services are available, free of charge. Please call [phone].      ATENCIÓN: Si avi julian, tiene a grossman disposición servicios gratuitos de asistencia lingüística. Llame al [phone].     CHÚ Ý: N?u b?n nói Ti?ng Vi?t, có các d?ch v? h? tr? ngôn ng? mi?n phí dành cho b?n. G?i s? [phone].         [facility] complies with applicable Federal civil rights laws and does not discriminate on the basis of race, color, national origin, age, disability, or sex.

## 2017-04-05 NOTE — PROGRESS NOTES
===============================  Mono Bergman,   71 y.o. male   Last visit [facility]: :2/22/2017   [facility] [place] [facility]  [facility]  [place] visual acuity was 20/30 in the right eye and 20/200 in the left eye.   Not recorded         Not recorded         Not recorded        Chief Complaint   Patient presents with    PDR/ME     eylea os     Ophthalmic Medications     Ophthalmic - Anti-inflammatory, Glucocorticoids Start End    prednisoLONE acetate (PRED FORTE) 1 % DrpS 4/5/2017 4/5/2018    Sig: Place 1 drop into both eyes 4 (four) times daily.    Route: Both Eyes    prednisoLONE acetate (PRED FORTE) 1 % DrpS (Discontinued) 4/5/2017 4/5/2017    Sig: Place 1 drop into both eyes 4 (four) times daily.    Route: Both Eyes    Reason for Discontinue: Reorder    prednisoLONE sodium phosphate (INFLAMASE FORTE) 1 % Drop (Discontinued) 5/2/2016 4/5/2017    Class: Historical Med    Reason for Discontinue: Alternate therapy    Ophthalmic - Anti-inflammatory, NSAIDs Start End    ketorolac 0.5% (ACULAR) 0.5 % Drop 4/5/2017 4/5/2018    Sig: Place 1 drop into both eyes 4 (four) times daily.    Route: Both Eyes    ketorolac 0.5% (ACULAR) 0.5 % Drop (Discontinued) 4/5/2017 4/5/2017    Sig: Place 1 drop into both eyes 4 (four) times daily.    Route: Both Eyes    Reason for Discontinue: Reorder         HPI     PDR/ME    Additional comments: eylea os           Comments   1.) + DM SINCE 1997  2.) + PDR OU/ CSME OS  Bilateral PRP  PPV OS with Dr. 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 TEARS BID OS  REFRESH GEL BID OS   OMEGA E BID P.O.    EYLEA OS 11/4/16 12/1/16 1/18/17 2/22/17       Last edited by Laya Carvalho on 4/5/2017  9:55 AM. (History)      Read Studies: y  Vitalsy    ________________  4/5/2017  Problem List Items Addressed This Visit      Type 2 diabetes mellitus with both eyes affected by proliferative retinopathy and macular edema, with long-term current use of insulin - Primary    Relevant Medications    aflibercept Soln 2 mg (Start on 4/5/2017 11:00 AM)    prednisoLONE acetate (PRED FORTE) 1 % DrpS    ciprofloxacin HCl (CILOXAN) 0.3 % ophthalmic solution    ketorolac 0.5% (ACULAR) 0.5 % Drop    Other Relevant Orders    Posterior Segment OCT Retina-Both eyes    Prior Authorization Order        .  od worse today  Os sl better but peristemt 4+me os   rec switch gears and rec tx od today  pciol ou   was takingv a salt tab po now stopped  Also problemtaic dry eye      4/5/2017  Diagnosis :  od dme  Today:   Eylea (afibercept) 2 mg/0.05 ml Intravitreal Injection , OD   Follow up: rtc 2 weks contiue tx os Check IOP first      Add pf  acular qid ou  Consider eventual streoid tx os..  If iop ok on drops        Call 24/7 for any worsening of vision. Check  OU QD. Gave my home phone number.      Procedure  Note:   OD}  Eylea (afibercept) 2 mg/0.05 ml Intravitreal Injection    I have explained the Risks, Benefits and Alternatives of the procedure in detail.  The patient voices understanding and all questions have been answered.  The patient agrees to proceed as discussed.  LIDOCAINE 2%  subconj bleb  was used for anesthesia.  Topical betadine was used for antisepsis.  0.05 cc was  injected 3.7 mm from corneal limbus in the inferotemporal quadrant.  Following injection the IOP was less than thirty (<30) by tonopen.  The eye was then thoroughly irrigated with BSS.  Patient tolerated procedure well.  No complications were observed.  The Patient was educated that mild irritation tonight was normal secondary to topical antispsis use.  Pt was advised to call at any time day or night for pain, redness, or any decline in vision. I gave the patient my home number as well as the clinic on call number. Daily visual checks and Amsler grid testing were reviewed.  ciloxan  Antibiotic Drops to be used 4 times daily for 4 days  VALERIANO Tucker MD  Procedure ordered: y  Consent: y  Pre auth: y  MAR:y  Opnote: y  Charge capture:y  Sided procedure note: y       ===========================

## 2017-04-12 DIAGNOSIS — Z79.4 TYPE 2 DIABETES MELLITUS WITH BOTH EYES AFFECTED BY PROLIFERATIVE RETINOPATHY AND MACULAR EDEMA, WITH LONG-TERM CURRENT USE OF INSULIN: ICD-10-CM

## 2017-04-12 DIAGNOSIS — E11.3513 TYPE 2 DIABETES MELLITUS WITH BOTH EYES AFFECTED BY PROLIFERATIVE RETINOPATHY AND MACULAR EDEMA, WITH LONG-TERM CURRENT USE OF INSULIN: ICD-10-CM

## 2017-04-12 RX ORDER — PREDNISOLONE SODIUM PHOSPHATE 10 MG/ML
1 SOLUTION/ DROPS OPHTHALMIC 4 TIMES DAILY
Qty: 10 ML | Refills: 1 | Status: SHIPPED | OUTPATIENT
Start: 2017-04-12 | End: 2017-04-19 | Stop reason: SDUPTHER

## 2017-04-12 RX ORDER — PREDNISOLONE SODIUM PHOSPHATE 10 MG/ML
1 SOLUTION/ DROPS OPHTHALMIC 4 TIMES DAILY
Qty: 10 ML | Refills: 1 | Status: SHIPPED | OUTPATIENT
Start: 2017-04-12 | End: 2017-04-12 | Stop reason: SDUPTHER

## 2017-04-12 RX ORDER — PREDNISOLONE SODIUM PHOSPHATE 10 MG/ML
1 SOLUTION/ DROPS OPHTHALMIC 4 TIMES DAILY
Qty: 40 DROP | Refills: 0 | Status: SHIPPED | OUTPATIENT
Start: 2017-04-12 | End: 2017-04-22

## 2017-04-19 ENCOUNTER — PROCEDURE VISIT (OUTPATIENT)
Dept: OPHTHALMOLOGY | Facility: CLINIC | Age: 72
End: 2017-04-19
Payer: MEDICARE

## 2017-04-19 DIAGNOSIS — Z79.4 TYPE 2 DIABETES MELLITUS WITH BOTH EYES AFFECTED BY PROLIFERATIVE RETINOPATHY AND MACULAR EDEMA, WITH LONG-TERM CURRENT USE OF INSULIN: Primary | ICD-10-CM

## 2017-04-19 DIAGNOSIS — E11.3513 TYPE 2 DIABETES MELLITUS WITH BOTH EYES AFFECTED BY PROLIFERATIVE RETINOPATHY AND MACULAR EDEMA, WITH LONG-TERM CURRENT USE OF INSULIN: Primary | ICD-10-CM

## 2017-04-19 PROCEDURE — 99499 UNLISTED E&M SERVICE: CPT | Mod: S$PBB,,, | Performed by: OPHTHALMOLOGY

## 2017-04-19 PROCEDURE — 67028 INJECTION EYE DRUG: CPT | Mod: S$PBB,LT,, | Performed by: OPHTHALMOLOGY

## 2017-04-19 PROCEDURE — 67028 INJECTION EYE DRUG: CPT | Mod: PBBFAC,PO,LT | Performed by: OPHTHALMOLOGY

## 2017-04-19 RX ORDER — PREDNISOLONE SODIUM PHOSPHATE 10 MG/ML
1 SOLUTION/ DROPS OPHTHALMIC 4 TIMES DAILY
Qty: 10 ML | Refills: 3 | Status: SHIPPED | OUTPATIENT
Start: 2017-04-19 | End: 2017-06-18

## 2017-04-19 RX ADMIN — AFLIBERCEPT 2 MG: 40 INJECTION, SOLUTION INTRAVITREAL at 11:04

## 2017-04-19 NOTE — MR AVS SNAPSHOT
[facility] - Ophthalmology  [address] [facility] [place] [address]  Phone: [phone]  Fax: [phone]                  Mono Bergman   2017 10:15 AM   Procedure visit    Description:  Male : 1945   Provider:  VALERIANO Tucker MD   Department:  [facility] - Ophthalmology           Reason for Visit     PDR/ME           Diagnoses this Visit        Comments    Type 2 diabetes mellitus with both eyes affected by proliferative retinopathy and macular edema, with long-term current use of insulin    -  Primary            To Do List           Goals (5 Years of Data)     HEMOGLOBIN A1C < 7.0     LDL CHOLESTEROL < 80       Follow-Up and Disposition     Return in about 1 month (around 2017).       These Medications        Disp Refills Start End    prednisoLONE sodium phosphate (INFLAMASE FORTE) 1 % Drop 10 mL 3 2017    Place 1 drop into both eyes 4 (four) times daily. - Both Eyes    Pharmacy: [id number]  [place] [facility] [address] [facility] [phone]         [facility]     [facility] Nurse Care [facility] -  Assistance  Unless otherwise directed by your provider, please contact [facility], our nurse care line that is available for  assistance.     Registered nurses in the [facility] Center provide: appointment scheduling, clinical advisement, health education, and other advisory services.  Call: [phone] (toll free)               Medications           Message regarding Medications     Verify the changes and/or additions to your medication regime listed below are the same as discussed with your clinician today.  If any of these changes or additions are incorrect, please notify your healthcare provider.        These medications were administered today        Dose Freq    aflibercept Soln 2 mg 2 mg Clinic/HOD 1 time    Si.05 mLs (2 mg total) by Intravitreal route one time.    Class: Normal    Route: Intravitreal           Verify that  "the below list of medications is an accurate representation of the medications you are currently taking.  If none reported, the list may be blank. If incorrect, please contact your healthcare provider. Carry this list with you in case of emergency.           Current Medications     ARGININE, L-ARGININE, ORAL Take by mouth.    aspirin 81 mg Tab Take 81 mg by mouth before breakfast.    atorvastatin (LIPITOR) 80 MG tablet Take 80 mg by mouth once daily.    biotin 5 mg Cap Take 5 mg by mouth before breakfast.    ergocalciferol (VITAMIN D2) 50,000 unit Cap Take 50,000 Units by mouth every 7 days.    fish oil-omega-3 fatty acids 300-1,000 mg capsule Take 1 g by mouth 2 (two) times daily.    GAS-X EXTRA STRENGTH 125 mg Cap capsule     insulin aspart (NOVOLOG) 100 unit/mL InPn pen Inject 6 units into skin 5 to 10 minutes before breakfast    insulin glargine (LANTUS) 100 unit/mL injection Inject 80 Units into the skin every evening.    insulin needles, disposable, (SURE-FINE PEN NEEDLES) 31 x 3/16 " Ndle Use twice daily to inject insulin    ketorolac 0.5% (ACULAR) 0.5 % Drop Place 1 drop into both eyes 4 (four) times daily.    L.acidophilus-B.bifidum&longum 150 mg (3 billion cell) Chew Take 1 tablet by mouth.    lisinopril (PRINIVIL,ZESTRIL) 2.5 MG tablet Take 2.5 mg by mouth once daily.    metformin (GLUCOPHAGE) 500 MG tablet Take 500 mg by mouth once.    midodrine (PROAMATINE) 5 MG Tab     OMEGA-3S/DHA/EPA/FISH OIL (OMEGA 3 ORAL) Take by mouth.    ondansetron (ZOFRAN) 4 MG tablet Take 4 mg by mouth.    pantoprazole (PROTONIX) 20 MG tablet Take by mouth.    pen needle, diabetic (BD INSULIN PEN NEEDLE UF MINI) 31 gauge x 3/16" Ndle     polyethylene glycol (GLYCOLAX) 17 gram/dose powder Take 17 g by mouth once daily.    prednisoLONE acetate (PRED FORTE) 1 % DrpS Place 1 drop into both eyes 4 (four) times daily.    prednisoLONE sodium phosphate (INFLAMASE FORTE) 1 % Drop Place 1 drop into both eyes 4 (four) times daily.    " prednisoLONE sodium phosphate (INFLAMASE FORTE) 1 % Drop Place 1 drop into both eyes 4 (four) times daily.    rosuvastatin (CRESTOR) 20 MG tablet Take 20 mg by mouth once daily.    simethicone 80 mg Tab Take 80 mg by mouth.    SODIUM CL/POTASSIUM CHLORIDE (THERMOTABS ORAL) Take 5 Units by mouth as needed.    white petrolatum-mineral oil 57.3-42.5% (REFRESH P.M.) 57.3-42.5 % Oint Place into both eyes every evening.    acetaZOLAMIDE (DIAMOX SEQUELS) 500 mg CpSR Take 1 capsule (500 mg total) by mouth 2 (two) times daily.    insulin aspart protamine-insulin aspart (NOVOLOG MIX 70-30 FLEXPEN) 100 unit/mL (70-30) InPn Inject 50 Units into the skin 2 (two) times daily before meals.           Clinical Reference Information           Allergies as of 4/19/2017     No Known Allergies      Immunizations Administered on Date of Encounter - 4/19/2017     None      Orders Placed During Today's Visit      Normal Orders This Visit    Prior Authorization Order       Administrations This Visit     aflibercept Soln 2 mg     Admin Date Action Dose Route Administered By             04/19/2017 Given 2 mg Intravitreal VALERIANO Tucker MD                      Language Assistance Services     ATTENTION: Language assistance services are available, free of charge. Please call [phone].      ATENCIÓN: Si avi julian, tiene a grossman disposición servicios gratuitos de asistencia lingüística. Llame al [phone].     [facility] Ý: N?u b?n nói Ti?ng Vi?t, có các d?ch v? h? tr? ngôn ng? mi?n phí dành cho b?n. G?i s? [phone].         [facility] - Ophthalmology complies with applicable Federal civil rights laws and does not discriminate on the basis of race, color, national origin, age, disability, or sex.

## 2017-04-19 NOTE — PROGRESS NOTES
===============================  Mono Bergman,   71 y.o. male   Last visit [facility]: :4/5/2017   Last visit eye dept. 4/5/2017  [facility]:  Corrected distance visual acuity was 20/40 +1 in the right eye and 20/200 in the left eye.   Not recorded         Not recorded         Not recorded        Chief Complaint   Patient presents with    PDR/ME     EYLEA OD     Ophthalmic Medications     Ophthalmic - Anti-inflammatory, Glucocorticoids Start End    prednisoLONE acetate (PRED FORTE) 1 % DrpS 4/5/2017 4/5/2018    Sig: Place 1 drop into both eyes 4 (four) times daily.    Route: Both Eyes    prednisoLONE sodium phosphate (INFLAMASE FORTE) 1 % Drop 4/12/2017 4/22/2017    Sig: Place 1 drop into both eyes 4 (four) times daily.    Class: Print    Route: Both Eyes    prednisoLONE sodium phosphate (INFLAMASE FORTE) 1 % Drop 4/19/2017 6/18/2017    Sig: Place 1 drop into both eyes 4 (four) times daily.    Route: Both Eyes    prednisoLONE sodium phosphate (INFLAMASE FORTE) 1 % Drop (Discontinued) 4/12/2017 4/19/2017    Sig: Place 1 drop into both eyes 4 (four) times daily.    Class: Print    Route: Both Eyes    Reason for Discontinue: Reorder    Ophthalmic - Anti-inflammatory, NSAIDs Start End    ketorolac 0.5% (ACULAR) 0.5 % Drop 4/5/2017 4/5/2018    Sig: Place 1 drop into both eyes 4 (four) times daily.    Route: Both Eyes         HPI     PDR/ME    Additional comments: EYLEA OD           Comments   1.) + DM SINCE 1997  2.) + PDR OU/ CSME OS  Bilateral PRP  PPV OS with Dr. 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 TEARS BID OS  REFRESH GEL BID OS   OMEGA E BID P.O.    EYLEA OS 11/4/16 12/1/16 1/18/17 2/22/17   GINA LESLIE 4/5/17       Last edited by Laya Carvalho on 4/19/2017 10:36 AM. (History)      Read Studies:   Vitals    ________________  4/19/2017  Problem  List Items Addressed This Visit        Eye/Vision problems    Type 2 diabetes mellitus with both eyes affected by proliferative retinopathy and macular edema, with long-term current use of insulin - Primary    Relevant Medications    aflibercept Soln 2 mg (Completed)    prednisoLONE sodium phosphate (INFLAMASE FORTE) 1 % Drop    Other Relevant Orders    Prior Authorization Order        Last visit 4/5/17 gave eylea OD  Was supposed to begin Prednisolone, but did not start drops  New rx today for Prednisolone  alterbnating tx of dme  streoid trial od  Plateau 3  rtc for OD Eylea ON Pred, CHECK IOP FIRST    4/19/2017  Diagnosis :  Os dme  Today:   Eylea (afibercept) 2 mg/0.05 ml Intravitreal Injection , OS   Follow up: rtc 3 weeks od eyela check iop first        Call 24/7 for any worsening of vision. Check  OU QD. Gave my home phone number.      Procedure  Note:   OS}  Eylea (afibercept) 2 mg/0.05 ml Intravitreal Injection    I have explained the Risks, Benefits and Alternatives of the procedure in detail.  The patient voices understanding and all questions have been answered.  The patient agrees to proceed as discussed.  LIDOCAINE 2%  subconj bleb  was used for anesthesia.  Topical betadine was used for antisepsis.  0.05 cc was  injected 3.7 mm from corneal limbus in the inferotemporal quadrant.  Following injection the IOP was less than thirty (<30) by tonopen.  The eye was then thoroughly irrigated with BSS.  Patient tolerated procedure well.  No complications were observed.  The Patient was educated that mild irritation tonight was normal secondary to topical antispsis use.  Pt was advised to call at any time day or night for pain, redness, or any decline in vision. I gave the patient my home number as well as the clinic on call number. Daily visual checks and Amsler grid testing were reviewed.  ciloxan Antibiotic Drops to be used 4 times daily for 4 days  VALERIANO Tucker MD  Procedure ordered: y  Consent: y  Pre  auth: y  MAR:y  Opnote: y  Charge capture:y  Sided procedure note: y    .       ===========================

## 2017-05-01 ENCOUNTER — HOSPITAL ENCOUNTER (EMERGENCY)
Facility: HOSPITAL | Age: 72
Discharge: HOME | End: 2017-05-02
Payer: MEDICARE

## 2017-05-01 DIAGNOSIS — R07.9 CHEST PAIN: ICD-10-CM

## 2017-05-01 DIAGNOSIS — N17.9 ACUTE RENAL FAILURE, UNSPECIFIED ACUTE RENAL FAILURE TYPE: ICD-10-CM

## 2017-05-01 DIAGNOSIS — R11.2 NON-INTRACTABLE VOMITING WITH NAUSEA, UNSPECIFIED VOMITING TYPE: ICD-10-CM

## 2017-05-01 DIAGNOSIS — E11.43 GASTROPARESIS DUE TO DM: Primary | ICD-10-CM

## 2017-05-01 DIAGNOSIS — K31.84 GASTROPARESIS DUE TO DM: Primary | ICD-10-CM

## 2017-05-01 DIAGNOSIS — R06.02 SOB (SHORTNESS OF BREATH): ICD-10-CM

## 2017-05-01 LAB
ALBUMIN SERPL BCP-MCNC: 3.1 G/DL
ALBUMIN SERPL BCP-MCNC: 3.2 G/DL
ALBUMIN SERPL BCP-MCNC: 3.6 G/DL
ALP SERPL-CCNC: 145 U/L
ALP SERPL-CCNC: 150 U/L
ALP SERPL-CCNC: 170 U/L
ALT SERPL W/O P-5'-P-CCNC: 19 U/L
ALT SERPL W/O P-5'-P-CCNC: 21 U/L
ALT SERPL W/O P-5'-P-CCNC: 24 U/L
AMYLASE SERPL-CCNC: 57 U/L
ANION GAP SERPL CALC-SCNC: 10 MMOL/L
ANION GAP SERPL CALC-SCNC: 11 MMOL/L
ANION GAP SERPL CALC-SCNC: 11 MMOL/L
AST SERPL-CCNC: 18 U/L
AST SERPL-CCNC: 19 U/L
AST SERPL-CCNC: 20 U/L
BASOPHILS # BLD AUTO: 0.02 K/UL
BASOPHILS NFR BLD: 0.1 %
BILIRUB SERPL-MCNC: 1.1 MG/DL
BILIRUB SERPL-MCNC: 1.1 MG/DL
BILIRUB SERPL-MCNC: 1.2 MG/DL
BUN SERPL-MCNC: 32 MG/DL
BUN SERPL-MCNC: 33 MG/DL
BUN SERPL-MCNC: 34 MG/DL
CALCIUM SERPL-MCNC: 7.9 MG/DL
CALCIUM SERPL-MCNC: 8 MG/DL
CALCIUM SERPL-MCNC: 8.8 MG/DL
CHLORIDE SERPL-SCNC: 100 MMOL/L
CHLORIDE SERPL-SCNC: 97 MMOL/L
CHLORIDE SERPL-SCNC: 99 MMOL/L
CO2 SERPL-SCNC: 23 MMOL/L
CO2 SERPL-SCNC: 24 MMOL/L
CO2 SERPL-SCNC: 25 MMOL/L
CREAT SERPL-MCNC: 1.6 MG/DL
CREAT SERPL-MCNC: 1.7 MG/DL
CREAT SERPL-MCNC: 1.9 MG/DL
DIFFERENTIAL METHOD: ABNORMAL
EOSINOPHIL # BLD AUTO: 0 K/UL
EOSINOPHIL NFR BLD: 0.1 %
ERYTHROCYTE [DISTWIDTH] IN BLOOD BY AUTOMATED COUNT: 12.7 %
EST. GFR  (AFRICAN AMERICAN): 41 ML/MIN/1.73 M^2
EST. GFR  (AFRICAN AMERICAN): 47 ML/MIN/1.73 M^2
EST. GFR  (AFRICAN AMERICAN): 50 ML/MIN/1.73 M^2
EST. GFR  (NON AFRICAN AMERICAN): 35 ML/MIN/1.73 M^2
EST. GFR  (NON AFRICAN AMERICAN): 40 ML/MIN/1.73 M^2
EST. GFR  (NON AFRICAN AMERICAN): 43 ML/MIN/1.73 M^2
GLUCOSE SERPL-MCNC: 273 MG/DL
GLUCOSE SERPL-MCNC: 276 MG/DL
GLUCOSE SERPL-MCNC: 284 MG/DL
HCT VFR BLD AUTO: 42.1 %
HGB BLD-MCNC: 15.1 G/DL
INR PPP: 1
LACTATE SERPL-SCNC: 1.3 MMOL/L
LIPASE SERPL-CCNC: 8 U/L
LYMPHOCYTES # BLD AUTO: 1.6 K/UL
LYMPHOCYTES NFR BLD: 9.2 %
MCH RBC QN AUTO: 30.3 PG
MCHC RBC AUTO-ENTMCNC: 35.9 %
MCV RBC AUTO: 85 FL
MONOCYTES # BLD AUTO: 1 K/UL
MONOCYTES NFR BLD: 6 %
NEUTROPHILS # BLD AUTO: 14.6 K/UL
NEUTROPHILS NFR BLD: 84.8 %
PLATELET # BLD AUTO: 234 K/UL
PMV BLD AUTO: 9.7 FL
POTASSIUM SERPL-SCNC: 4.3 MMOL/L
POTASSIUM SERPL-SCNC: 4.6 MMOL/L
POTASSIUM SERPL-SCNC: 5.1 MMOL/L
PROT SERPL-MCNC: 6.6 G/DL
PROT SERPL-MCNC: 6.8 G/DL
PROT SERPL-MCNC: 7.7 G/DL
PROTHROMBIN TIME: 10.7 SEC
RBC # BLD AUTO: 4.98 M/UL
SODIUM SERPL-SCNC: 133 MMOL/L
SODIUM SERPL-SCNC: 133 MMOL/L
SODIUM SERPL-SCNC: 134 MMOL/L
WBC # BLD AUTO: 17.26 K/UL

## 2017-05-01 PROCEDURE — 80053 COMPREHEN METABOLIC PANEL: CPT | Mod: 91

## 2017-05-01 PROCEDURE — 96360 HYDRATION IV INFUSION INIT: CPT

## 2017-05-01 PROCEDURE — 25500020 PHARM REV CODE 255: Performed by: PHYSICIAN ASSISTANT

## 2017-05-01 PROCEDURE — 85610 PROTHROMBIN TIME: CPT

## 2017-05-01 PROCEDURE — 99284 EMERGENCY DEPT VISIT MOD MDM: CPT | Mod: 25

## 2017-05-01 PROCEDURE — 83690 ASSAY OF LIPASE: CPT

## 2017-05-01 PROCEDURE — 82962 GLUCOSE BLOOD TEST: CPT

## 2017-05-01 PROCEDURE — 82150 ASSAY OF AMYLASE: CPT

## 2017-05-01 PROCEDURE — 83605 ASSAY OF LACTIC ACID: CPT

## 2017-05-01 PROCEDURE — 85025 COMPLETE CBC W/AUTO DIFF WBC: CPT

## 2017-05-01 PROCEDURE — 93010 ELECTROCARDIOGRAM REPORT: CPT | Mod: ,,, | Performed by: INTERNAL MEDICINE

## 2017-05-01 PROCEDURE — 96361 HYDRATE IV INFUSION ADD-ON: CPT

## 2017-05-01 PROCEDURE — 93005 ELECTROCARDIOGRAM TRACING: CPT

## 2017-05-01 PROCEDURE — 25000003 PHARM REV CODE 250: Performed by: PHYSICIAN ASSISTANT

## 2017-05-01 RX ADMIN — SODIUM CHLORIDE 1000 ML: 0.9 INJECTION, SOLUTION INTRAVENOUS at 08:05

## 2017-05-01 RX ADMIN — IOHEXOL 30 ML: 350 INJECTION, SOLUTION INTRAVENOUS at 07:05

## 2017-05-01 RX ADMIN — SODIUM CHLORIDE 1000 ML: 0.9 INJECTION, SOLUTION INTRAVENOUS at 09:05

## 2017-05-01 NOTE — ED AVS SNAPSHOT
[facility]  [address] [address] [address]               Mono Bergman   2017  6:38 PM   ED    Description:  Male : 1945   Department:  [facility]            Your Care was Coordinated By:     Provider Role From To    VANDANA Nina Physician Assistant 17 [phone] --      Reason for Visit     Emesis           Diagnoses this Visit        Comments    Gastroparesis due to DM    -  Primary     SOB (shortness of breath)         Chest pain         Acute renal failure, unspecified acute renal failure type         Non-intractable vomiting with nausea, unspecified vomiting type           ED Disposition     None           To Do List           Follow-up Information     Follow [facility] with [facility] In 2 days.    Why:  As needed, If symptoms worsen return to ED     Contact information:    [address] [facility] [address]  [phone]         These Medications        Disp Refills Start End    metoclopramide HCl (REGLAN) 10 MG tablet 100 tablet 0 2017     Take 1 tablet (10 mg total) by mouth 4 (four) times daily. - Oral    Pharmacy: [facility]/pharmacy #[id number] - [place] - [address] [facility] [address] [facility] Ph #: [phone]       promethazine (PHENERGAN) 25 MG tablet 60 tablet 0 2017     Take 1 tablet (25 mg total) by mouth every 6 (six) hours as needed for Nausea. - Oral    Pharmacy: [facility]/pharmacy #[id number] - [place] - [address] [facility] [address] Ph #: [phone]         [facility]     [facility] - 24/7 Assistance  Unless otherwise directed by your provider, please contact [facility], our nurse care line that is available for 24/7 assistance.     Registered nurses in the [facility] provide: appointment scheduling, clinical advisement, health education, and other advisory services.  Call: [phone] (toll free)                Medications           Message regarding Medications     Verify the changes and/or additions to your medication regime listed below are the same as discussed with your clinician today.  If any of these changes or additions are incorrect, please notify your healthcare provider.        START taking these NEW medications        Refills    metoclopramide HCl (REGLAN) 10 MG tablet 0    Sig: Take 1 tablet (10 mg total) by mouth 4 (four) times daily.    Class: Print    Route: Oral    promethazine (PHENERGAN) 25 MG tablet 0    Sig: Take 1 tablet (25 mg total) by mouth every 6 (six) hours as needed for Nausea.    Class: Print    Route: Oral      These medications were administered today        Dose Freq    sodium chloride 0.9% bolus 1,000 mL 1,000 mL ED 1 Time    Sig: Inject 1,000 mLs into the vein ED 1 Time.    Class: Normal    Route: Intravenous    Cosign for Ordering: Accepted by Anna Saul MD on 5/2/2017 12:51 AM    sodium chloride 0.9% bolus 1,000 mL 1,000 mL ED 1 Time    Sig: Inject 1,000 mLs into the vein ED 1 Time.    Class: Normal    Route: Intravenous    Cosign for Ordering: Accepted by Chung Avila MD on 5/1/2017 10:00 PM    omnipaque 350 iohexol 30 mL 30 mL IMG once as needed    Sig: Take 30 mLs by mouth ONCE PRN for contrast.    Class: Normal    Route: Oral    Cosign for Ordering: Required by [facility] HIM            Verify that the below list of medications is an accurate representation of the medications you are currently taking.  If none reported, the list may be blank. If incorrect, please contact your healthcare provider. Carry this list with you in case of emergency.           Current Medications     acetaZOLAMIDE (DIAMOX SEQUELS) 500 mg CpSR Take 1 capsule (500 mg total) by mouth 2 (two) times daily.    ARGININE, L-ARGININE, ORAL Take by mouth.    aspirin 81 mg Tab Take 81 mg by mouth before breakfast.    atorvastatin (LIPITOR) 80 MG tablet Take 80 mg by mouth once daily.    biotin  "5 mg Cap Take 5 mg by mouth before breakfast.    ergocalciferol (VITAMIN D2) 50,000 unit Cap Take 50,000 Units by mouth every 7 days.    fish oil-omega-3 fatty acids 300-1,000 mg capsule Take 1 g by mouth 2 (two) times daily.    GAS-X EXTRA STRENGTH 125 mg Cap capsule     insulin aspart (NOVOLOG) 100 unit/mL InPn pen Inject 6 units into skin 5 to 10 minutes before breakfast    insulin aspart protamine-insulin aspart (NOVOLOG MIX 70-30 FLEXPEN) 100 unit/mL (70-30) InPn Inject 50 Units into the skin 2 (two) times daily before meals.    insulin glargine (LANTUS) 100 unit/mL injection Inject 80 Units into the skin every evening.    insulin needles, disposable, (SURE-FINE PEN NEEDLES) 31 x 3/16 " Ndle Use twice daily to inject insulin    ketorolac 0.5% (ACULAR) 0.5 % Drop Place 1 drop into both eyes 4 (four) times daily.    L.acidophilus-B.bifidum&longum 150 mg (3 billion cell) Chew Take 1 tablet by mouth.    lisinopril (PRINIVIL,ZESTRIL) 2.5 MG tablet Take 2.5 mg by mouth once daily.    metformin (GLUCOPHAGE) 500 MG tablet Take 500 mg by mouth once.    metoclopramide HCl (REGLAN) 10 MG tablet Take 1 tablet (10 mg total) by mouth 4 (four) times daily.    midodrine (PROAMATINE) 5 MG Tab     OMEGA-3S/DHA/EPA/FISH OIL (OMEGA 3 ORAL) Take by mouth.    ondansetron (ZOFRAN) 4 MG tablet Take 4 mg by mouth.    pantoprazole (PROTONIX) 20 MG tablet Take by mouth.    pen needle, diabetic (BD INSULIN PEN NEEDLE UF MINI) 31 gauge x 3/16" Ndle     polyethylene glycol (GLYCOLAX) 17 gram/dose powder Take 17 g by mouth once daily.    prednisoLONE acetate (PRED FORTE) 1 % DrpS Place 1 drop into both eyes 4 (four) times daily.    prednisoLONE sodium phosphate (INFLAMASE FORTE) 1 % Drop Place 1 drop into both eyes 4 (four) times daily.    promethazine (PHENERGAN) 25 MG tablet Take 1 tablet (25 mg total) by mouth every 6 (six) hours as needed for Nausea.    rosuvastatin (CRESTOR) 20 MG tablet Take 20 mg by mouth once daily.    simethicone 80 " "mg Tab Take 80 mg by mouth.    SODIUM CL/POTASSIUM CHLORIDE (THERMOTABS ORAL) Take 5 Units by mouth as needed.    white petrolatum-mineral oil 57.3-42.5% (REFRESH P.M.) 57.3-42.5 % Oint Place into both eyes every evening.           Clinical Reference Information           Your Vitals Were     BP Pulse Temp Resp Height Weight    131/92 98 98.3 °F (36.8 °C) 16 5' 8" (1.727 m) 99.8 kg (220 lb)    SpO2 BMI             99% 33.45 kg/m2         Allergies as of 5/2/2017     No Known Allergies      Immunizations Administered on Date of Encounter - 5/2/2017     None      ED Micro, Lab, POCT     Start Ordered       Status Ordering Provider    05/01/17 2255 05/01/17 2254  Comprehensive metabolic panel  STAT     Comments:  AFTER SECOND BOLUS    Final result     05/01/17 2106 05/01/17 2106  Comprehensive metabolic panel  STAT     Comments:  AFTER SECOND BOLUS    Final result     05/01/17 2050 05/01/17 2050  POCT glucose  Once      Final result     05/01/17 1952 05/01/17 1951  POCT glucose  Once      Acknowledged     05/01/17 1902 05/01/17 1905  Lactic acid, plasma  STAT      Final result     05/01/17 1902 05/01/17 1905  Amylase  STAT      Final result     05/01/17 1902 05/01/17 1905  Lipase  STAT      Final result     05/01/17 1902 05/01/17 1905  Urinalysis  STAT      Acknowledged     05/01/17 1902 05/01/17 1905  Protime-INR  Once      Final result     05/01/17 1901 05/01/17 1905  CBC auto differential  STAT      Final result     05/01/17 1901 05/01/17 1905  Comprehensive metabolic panel  STAT      Final result       ED Imaging Orders     Start Ordered       Status Ordering Provider    05/02/17 0007 05/01/17 1905  CT Abdomen Pelvis  Without Contrast  1 time imaging      In process     05/01/17 1905 05/01/17 1905    1 time imaging,   Status:  Canceled      Canceled     05/01/17 1902 05/01/17 1905  X-Ray Chest PA And Lateral  1 time imaging      Final result         Discharge Instructions         Gastroparesis     Gastroparesis " means that food and fluids move too slowly out of the stomach into the duodenum.   Gastroparesis (also called delayed gastric emptying) happens when the stomach takes longer than normal to empty of food. This is due to a problem with motility (the movement of the muscles in the digestive tract). For many people, gastroparesis is a lifelong condition. But treatment can help relieve symptoms and prevent complications. Read on to learn more about gastroparesis and how it can be managed.  How gastroparesis develops  With normal motility, signals from nerves tell the stomach muscles when to contract. These muscles move food from the stomach into the duodenum (the first part of the small bowel). With gastroparesis, the nerves or muscles are damaged. This causes motility to slow down or stop completely. As a result, food cannot move from the stomach properly. This delayed emptying can cause nausea, vomiting, and other symptoms. Malnutrition can result. Bezoars (hardened lumps of food) can form in the stomach and cause other complications as well.  Causes of gastroparesis  Gastroparesis can be caused by any of the following:  · Diabetes  · Surgery involving any of the digestive organs, such as the stomach and bowels  · Certain medicines, such as strong pain medicines (narcotics)  · Certain conditions, such as systemic scleroderma, Parkinson disease, and thyroid disease  · After a viral illness  In many cases, the cause of gastroparesis cannot be found.  Signs and symptoms of gastroparesis  These can include:  · Nausea and vomiting  · Feeling full quickly when eating  · Belly pain  · Heartburn  · Belly bloating  · Weight loss  · Loss of appetite  · High and low blood sugar levels (in people with diabetes)  Diagnosing gastroparesis  Your healthcare provider will ask about your symptoms and health history. Youll also be examined. In addition, blood tests and X-rays are often done to check your health and rule out other  problems. To confirm the problem, you may need other tests as well. These can include:  · Upper endoscopy. This is done to see inside the stomach and duodenum. For the test, an endoscope is used. This is a thin, flexible tube with a tiny camera on the end. Its inserted through the mouth and down into the stomach and duodenum.  · Upper gastrointestinal (GI) series. This is done to take X-rays of the upper GI tract from the mouth to the small bowel. For the test, a substance called barium is used. The barium coats the upper GI tract so that it will show up clearly on X-rays.  · Gastric emptying scan. This is done to measure how quickly food leaves the stomach. For the test, a meal containing a harmless radioactive substance (tracer) is eaten. Then scans of the stomach are done. The tracer shows up clearly on the scans and shows the movement of the food through the stomach.  · Antroduodenal manometry. This test gives pressure measurements of the stomach and small intestine to check how the contractions are working.  · Newer tests. These are being created and include breath tests and wireless capsule studies   Treating gastroparesis  The goal of treatment is to help you manage your condition. Treatment may include one or more of the following:  · Dietary changes. You may need to make changes to your eating habits and daily diet. For instance, your healthcare provider may instruct you to eat small meals throughout the day. Doing this can keep you from feeling full too quickly. You may be placed on a liquid or soft diet. This means youll eat liquid foods or foods that are mashed or put through a . In addition, you may need to avoid foods high in fats and fiber. These can slow digestion. For more help with your diet, your healthcare provider can refer you to a dietitian. In severe cases, you may need a feeding tube. This sends liquid food or medicine directly to your small bowel, bypassing the  stomach.  · Treating diabetes. If you are diabetic, it is important to control your blood sugar. High sugar levels worsen gastroparesis.   · Medicines. These can help manage symptoms, such as nausea and vomiting. They can also improve motility. Each medicine has specific risks and side effects. Your doctor can tell you more about any medicine that is prescribed for you.  · Surgery. You may need to have a tube surgically inserted into the stomach. The tube removes excess air and fluid. This can relieve severe symptoms of nausea and vomiting. In rare cases, other surgery may be needed on the stomach or small bowel. This is to create a new passageway for food to be emptied from the stomach.  · Gastric electrical stimulation. This treatment is done less often and may not be available. Your healthcare provider can tell you more about this treatment if it is a choice for you.  Diabetes and gastroparesis  If you have diabetes, gastroparesis can make it harder to manage your blood sugar level. Youll need to take extra steps in your treatment to prevent complications. Work with your healthcare provider to learn what you can do to protect your health. For more information, contact the American Diabetes Association, www.diabetes.org.   Long-term concerns   With treatment, most people can manage their symptoms and maintain their usual routines. If your symptoms are moderate to severe, you may need to see your healthcare provider more often for checkups. Also, other treatments will likely be needed.  Date Last Reviewed: 7/14/2016 © 2000-2016 [employer]. [address] [place] [address], [place] [address]. All rights reserved. This information is not intended as a substitute for professional medical care. Always follow your healthcare professional's instructions.          Your Scheduled Appointments     May 17, 2017  9:45 AM CDT   Procedure with VALERIANO Tucker MD   [facility] ([facility])    [address]  [place] [address]   [phone]               [facility] complies with applicable Federal civil rights laws and does not discriminate on the basis of race, color, national origin, age, disability, or sex.        Language Assistance Services     ATTENTION: Language assistance services are available, free of charge. Please call [phone].      ATENCIÓN: Si habla español, tiene a grossman disposición servicios gratuitos de asistencia lingüística. Llame al [phone].     CHÚ Ý: N?u b?n nói Ti?ng Vi?t, có các d?ch v? h? tr? ngôn ng? mi?n phí dành cho b?n. G?i s? [phone].

## 2017-05-01 NOTE — ED NOTES
Bed: RWR 04  Expected date:   Expected time:   Means of arrival:   Comments:     VANDANA Nina  05/01/17 1622

## 2017-05-01 NOTE — ED PROVIDER NOTES
SCRIBE #1 NOTE: IAnaly, am scribing for, and in the presence of, VANDANA Rendon. I have scribed the HPI, ROS, PEx.    SCRIBE #2 NOTE: I, Herman Edmondson, am scribing for, and in the presence of,  VANDANA Rendon. I have scribed the remaining portions of the note not scribed by Scribe #1.     History      Chief Complaint   Patient presents with    Emesis     pts cousin states pt has been vomiting       Review of patient's allergies indicates:  No Known Allergies     HPI   HPI    5/1/2017, 6:40 PM   History obtained from the patient      History of Present Illness: Mono Bergman is a 71 y.o. male patient who presents to the Emergency Department for emesis which onset suddenly 5 nights ago. Symptoms are episodic and moderate in severity. He reports that he has been vomiting daily since 5 days ago. This has happened several times before and pt states he has been admitted for similar sxs in the past. He has SOB and heartburn which usually accompany the vomiting episodes. Prior tx include Phenergan. No mitigating or exacerbating factors reported. He denies fever, chills, diarrhea, dysuria, hematuria, diaphoresis, dizziness, and all other sxs at this time. He states that he has DM and that his blood sugar level was 200 this morning. He has been compliant with his medications. No further complaints or concerns at this time.       Arrival mode: Personal vehicle      PCP: [facility]       Past Medical History:  Past Medical History:   Diagnosis Date    Arthritis     Cataract     Colon polyps 10/29/13    Diabetes mellitus type II     Diabetic retinopathy     GERD (gastroesophageal reflux disease) 7/18/2013    Hyperlipidemia LDL goal < 70 7/18/2013    Hypertension     Low BP    Uveitis 5/11/2016       Past Surgical History:  Past Surgical History:   Procedure Laterality Date    CATARACT EXTRACTION W/  INTRAOCULAR LENS IMPLANT Left 12/10/14    CATARACT EXTRACTION  W/  INTRAOCULAR LENS IMPLANT Right 07/13/2016    CHOLECYSTECTOMY      COLONOSCOPY N/A 2/3/2017    Procedure: COLONOSCOPY;  Surgeon: Natan Magdaleno MD;  Location: [facility];  Service: Endoscopy;  Laterality: N/A;    EYE SURGERY      KNEE SURGERY  1971    left knee    rk rt. eye           Family History:  Family History   Problem Relation Age of Onset    Heart disease Mother     Colon cancer Neg Hx        Social History:  Social History     Social History Main Topics    Smoking status: Never Smoker    Smokeless tobacco: Never Used    Alcohol use No    Drug use: No    Sexual activity: Not Currently       ROS   Review of Systems   Constitutional: Negative for chills, diaphoresis and fever.   HENT: Negative for sore throat.    Respiratory: Positive for cough and shortness of breath. Negative for chest tightness.    Cardiovascular: Positive for chest pain (heartburn). Negative for palpitations and leg swelling.   Gastrointestinal: Positive for nausea and vomiting. Negative for abdominal distention, blood in stool, constipation and diarrhea.   Genitourinary: Negative for dysuria and hematuria.   Musculoskeletal: Negative for back pain.   Skin: Negative for rash.   Neurological: Negative for dizziness, weakness, numbness and headaches.   Hematological: Does not bruise/bleed easily.   All other systems reviewed and are negative.      Physical Exam    Initial Vitals   BP Pulse Resp Temp SpO2   05/01/17 1756 05/01/17 1756 05/01/17 1756 05/01/17 1756 05/01/17 1756   137/71 84 16 98.4 °F (36.9 °C) 92 %      Physical Exam  Nursing Notes and Vital Signs Reviewed.  Constitutional: Patient is in no acute distress. Awake and alert. Well-developed and well-nourished.  Head: Atraumatic. Normocephalic.  Eyes: PERRL. EOM intact. Conjunctivae are not pale. No scleral icterus.  ENT: Mucous membranes are moist. Oropharynx is clear and symmetric.    Neck: Supple. Full ROM. No lymphadenopathy.  Cardiovascular: Regular rate.  "Regular rhythm. No murmurs, rubs, or gallops. Distal pulses are 2+ and symmetric.  Pulmonary/Chest: No respiratory distress. Clear to auscultation bilaterally. No wheezing, rales, or rhonchi.  Abdominal: Soft and non-distended.  There is no tenderness.  No rebound, guarding, or rigidity.  Good bowel sounds.    Musculoskeletal: Moves all extremities. No obvious deformities. No edema. No calf tenderness.  Skin: Warm and dry.  Neurological:  Alert, awake, and appropriate.  Normal speech.  No acute focal neurological deficits are appreciated.  Psychiatric: Normal affect. Good eye contact. Appropriate in content.    ED Course    Procedures  ED Vital Signs:  Vitals:    05/01/17 1756 05/01/17 2241   BP: 137/71 (!) 131/92   Pulse: 84 98   Resp: 16 16   Temp: 98.4 °F (36.9 °C) 98.3 °F (36.8 °C)   TempSrc: Oral    SpO2: (!) 92% 99%   Weight: 99.8 kg (220 lb)    Height: 5' 8" (1.727 m)        Abnormal Lab Results:  Labs Reviewed   CBC W/ AUTO DIFFERENTIAL - Abnormal; Notable for the following:        Result Value    WBC 17.26 (*)     Gran # 14.6 (*)     Gran% 84.8 (*)     Lymph% 9.2 (*)     All other components within normal limits   COMPREHENSIVE METABOLIC PANEL - Abnormal; Notable for the following:     Sodium 133 (*)     Glucose 273 (*)     BUN, Bld 32 (*)     Creatinine 1.9 (*)     Total Bilirubin 1.2 (*)     Alkaline Phosphatase 170 (*)     eGFR if  41 (*)     eGFR if non  35 (*)     All other components within normal limits   COMPREHENSIVE METABOLIC PANEL - Abnormal; Notable for the following:     Sodium 133 (*)     Glucose 284 (*)     BUN, Bld 34 (*)     Creatinine 1.7 (*)     Calcium 8.0 (*)     Albumin 3.1 (*)     Total Bilirubin 1.1 (*)     Alkaline Phosphatase 145 (*)     eGFR if  47 (*)     eGFR if non  40 (*)     All other components within normal limits    Narrative:     AFTER SECOND BOLUS   COMPREHENSIVE METABOLIC PANEL - Abnormal; Notable for the " following:     Sodium 134 (*)     Glucose 276 (*)     BUN, Bld 33 (*)     Creatinine 1.6 (*)     Calcium 7.9 (*)     Albumin 3.2 (*)     Total Bilirubin 1.1 (*)     Alkaline Phosphatase 150 (*)     eGFR if  50 (*)     eGFR if non  43 (*)     All other components within normal limits    Narrative:     AFTER SECOND BOLUS   POCT GLUCOSE - Abnormal; Notable for the following:     POCT Glucose 290 (*)     All other components within normal limits   LACTIC ACID, PLASMA   AMYLASE   LIPASE   PROTIME-INR   URINALYSIS   POCT GLUCOSE MONITORING CONTINUOUS        All Lab Results:  Results for orders placed or performed during the hospital encounter of 05/01/17   CBC auto differential   Result Value Ref Range    WBC 17.26 (H) 3.90 - 12.70 K/uL    RBC 4.98 4.60 - 6.20 M/uL    Hemoglobin 15.1 14.0 - 18.0 g/dL    Hematocrit 42.1 40.0 - 54.0 %    MCV 85 82 - 98 fL    MCH 30.3 27.0 - 31.0 pg    MCHC 35.9 32.0 - 36.0 %    RDW 12.7 11.5 - 14.5 %    Platelets 234 150 - 350 K/uL    MPV 9.7 9.2 - 12.9 fL    Gran # 14.6 (H) 1.8 - 7.7 K/uL    Lymph # 1.6 1.0 - 4.8 K/uL    Mono # 1.0 0.3 - 1.0 K/uL    Eos # 0.0 0.0 - 0.5 K/uL    Baso # 0.02 0.00 - 0.20 K/uL    Gran% 84.8 (H) 38.0 - 73.0 %    Lymph% 9.2 (L) 18.0 - 48.0 %    Mono% 6.0 4.0 - 15.0 %    Eosinophil% 0.1 0.0 - 8.0 %    Basophil% 0.1 0.0 - 1.9 %    Differential Method Automated    Comprehensive metabolic panel   Result Value Ref Range    Sodium 133 (L) 136 - 145 mmol/L    Potassium 5.1 3.5 - 5.1 mmol/L    Chloride 97 95 - 110 mmol/L    CO2 25 23 - 29 mmol/L    Glucose 273 (H) 70 - 110 mg/dL    BUN, Bld 32 (H) 8 - 23 mg/dL    Creatinine 1.9 (H) 0.5 - 1.4 mg/dL    Calcium 8.8 8.7 - 10.5 mg/dL    Total Protein 7.7 6.0 - 8.4 g/dL    Albumin 3.6 3.5 - 5.2 g/dL    Total Bilirubin 1.2 (H) 0.1 - 1.0 mg/dL    Alkaline Phosphatase 170 (H) 55 - 135 U/L    AST 20 10 - 40 U/L    ALT 24 10 - 44 U/L    Anion Gap 11 8 - 16 mmol/L    eGFR if  41 (A)  >60 mL/min/1.73 m^2    eGFR if non African American 35 (A) >60 mL/min/1.73 m^2   Lactic acid, plasma   Result Value Ref Range    Lactate (Lactic Acid) 1.3 0.5 - 2.2 mmol/L   Amylase   Result Value Ref Range    Amylase 57 20 - 110 U/L   Lipase   Result Value Ref Range    Lipase 8 4 - 60 U/L   Protime-INR   Result Value Ref Range    Prothrombin Time 10.7 9.0 - 12.5 sec    INR 1.0 0.8 - 1.2   Comprehensive metabolic panel   Result Value Ref Range    Sodium 133 (L) 136 - 145 mmol/L    Potassium 4.3 3.5 - 5.1 mmol/L    Chloride 99 95 - 110 mmol/L    CO2 24 23 - 29 mmol/L    Glucose 284 (H) 70 - 110 mg/dL    BUN, Bld 34 (H) 8 - 23 mg/dL    Creatinine 1.7 (H) 0.5 - 1.4 mg/dL    Calcium 8.0 (L) 8.7 - 10.5 mg/dL    Total Protein 6.6 6.0 - 8.4 g/dL    Albumin 3.1 (L) 3.5 - 5.2 g/dL    Total Bilirubin 1.1 (H) 0.1 - 1.0 mg/dL    Alkaline Phosphatase 145 (H) 55 - 135 U/L    AST 19 10 - 40 U/L    ALT 21 10 - 44 U/L    Anion Gap 10 8 - 16 mmol/L    eGFR if African American 47 (A) >60 mL/min/1.73 m^2    eGFR if non African American 40 (A) >60 mL/min/1.73 m^2   Comprehensive metabolic panel   Result Value Ref Range    Sodium 134 (L) 136 - 145 mmol/L    Potassium 4.6 3.5 - 5.1 mmol/L    Chloride 100 95 - 110 mmol/L    CO2 23 23 - 29 mmol/L    Glucose 276 (H) 70 - 110 mg/dL    BUN, Bld 33 (H) 8 - 23 mg/dL    Creatinine 1.6 (H) 0.5 - 1.4 mg/dL    Calcium 7.9 (L) 8.7 - 10.5 mg/dL    Total Protein 6.8 6.0 - 8.4 g/dL    Albumin 3.2 (L) 3.5 - 5.2 g/dL    Total Bilirubin 1.1 (H) 0.1 - 1.0 mg/dL    Alkaline Phosphatase 150 (H) 55 - 135 U/L    AST 18 10 - 40 U/L    ALT 19 10 - 44 U/L    Anion Gap 11 8 - 16 mmol/L    eGFR if African American 50 (A) >60 mL/min/1.73 m^2    eGFR if non African American 43 (A) >60 mL/min/1.73 m^2   POCT glucose   Result Value Ref Range    POCT Glucose 290 (H) 70 - 110 mg/dL       Imaging Results:  Imaging Results         CT Abdomen Pelvis  Without Contrast (In process) Result time:  05/02/17 00:06:55    Procedure  changed from CT Abdomen Pelvis With Contrast            X-Ray Chest PA And Lateral (Final result) Result time:  05/01/17 19:41:53    Final result by Ashok Turner MD (05/01/17 19:41:53)    Impression:           1.  Markedly low lung volumes with vascular crowding or atelectasis in the lung bases.  2.  Incidental findings as noted above.      Electronically signed by: ASHOK TURNER MD  Date:     05/01/17  Time:    19:41     Narrative:    PA and Lateral Chest x-ray    Clinical Indication: Shortness of breath.       Findings:    No comparison studies are available.   Markedly low lung volumes with vascular crowding or atelectasis in the lung bases.  The lungs are otherwise clear. The cardiac silhouette size is a mid limits of normal. The trachea is midline and the mediastinal width is normal. Negative for effusion or pneumothorax. Pulmonary vasculature is normal. Negative for osseous abnormalities. There are calcifications of the aortic knob and tortuosity of the descending thoracic aorta. There are degenerative changes of spine and both shoulder girdles.             The EKG was ordered, reviewed, and independently interpreted by the ED provider.  Interpretation time: 21:51  Rate: 88 BPM  Rhythm: normal sinus rhythm  Interpretation: Left axis deviation. Voltage criteria for LVH. No STEMI.    The Emergency Provider reviewed the vital signs and test results, which are outlined above.    ED Discussion     9:36 PM: Re-evaluated pt. Pt is currently c/o CP. Will order EKG.    ED Medication(s):  Medications   sodium chloride 0.9% bolus 1,000 mL (0 mLs Intravenous Stopped 5/1/17 2155)   sodium chloride 0.9% bolus 1,000 mL (0 mLs Intravenous Stopped 5/1/17 2315)   omnipaque 350 iohexol 30 mL (30 mLs Oral Given 5/1/17 1940)       New Prescriptions    METOCLOPRAMIDE HCL (REGLAN) 10 MG TABLET    Take 1 tablet (10 mg total) by mouth 4 (four) times daily.    PROMETHAZINE (PHENERGAN) 25 MG TABLET    Take 1 tablet (25 mg total) by  mouth every 6 (six) hours as needed for Nausea.       Follow-up Information     Follow up with [facility] In 2 days.    Why:  As needed, If symptoms worsen return to ED     Contact information:    [address] [facility] [address] [facility] [address] [facility] [address]  [phone]             Medical Decision Making    Medical Decision Making:   Clinical Tests:   Lab Tests: Reviewed and Ordered  Radiological Study: Ordered and Reviewed  Medical Tests: Ordered and Reviewed           Scribe Attestation:   Scribe #1: I performed the above scribed service and the documentation accurately describes the services I performed. I attest to the accuracy of the note.    Attending:   Physician Attestation Statement for Scribe #1: IBa PA, personally performed the services described in this documentation, as scribed by Analy Mckeon, in my presence, and it is both accurate and complete.       Scribe Attestation:   Scribe #2: I performed the above scribed service and the documentation accurately describes the services I performed. I attest to the accuracy of the note.    Attending Attestation:           Physician Attestation for Scribe:    Physician Attestation Statement for Scribe #2: I, VANDANA Rendon, reviewed documentation, as scribed by Herman Edmondson in my presence, and it is both accurate and complete. I also acknowledge and confirm the content of the note done by Scribe #1.          Clinical Impression       ICD-10-CM ICD-9-CM   1. 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 SOB (shortness of breath) R06.02 786.05   3. Chest pain R07.9 786.50   4. Acute renal failure, unspecified acute renal failure type N17.9 584.9   5. Non-intractable vomiting with nausea, unspecified vomiting type R11.2 787.01               VANDANA Nina  05/02/17 0132

## 2017-05-02 VITALS
WEIGHT: 220 LBS | HEART RATE: 100 BPM | OXYGEN SATURATION: 94 % | HEIGHT: 68 IN | RESPIRATION RATE: 16 BRPM | SYSTOLIC BLOOD PRESSURE: 169 MMHG | TEMPERATURE: 98 F | BODY MASS INDEX: 33.34 KG/M2 | DIASTOLIC BLOOD PRESSURE: 100 MMHG

## 2017-05-02 LAB — POCT GLUCOSE: 290 MG/DL (ref 70–110)

## 2017-05-02 RX ORDER — PROMETHAZINE HYDROCHLORIDE 25 MG/1
25 TABLET ORAL EVERY 6 HOURS PRN
Qty: 60 TABLET | Refills: 0 | Status: SHIPPED | OUTPATIENT
Start: 2017-05-02 | End: 2018-09-20

## 2017-05-02 RX ORDER — METOCLOPRAMIDE 10 MG/1
10 TABLET ORAL 4 TIMES DAILY
Qty: 100 TABLET | Refills: 0 | Status: SHIPPED | OUTPATIENT
Start: 2017-05-02 | End: 2019-08-15

## 2017-05-02 NOTE — ED NOTES
Patient complains of nausea and vomiting since Wednesday. . Symptoms have been present for several days.     Level of Consciousness: The patient is awake, alert, and oriented with appropriate affect and speech; oriented to person, place and time.  Appearance: Sitting in chair with no acute distress noted. Clothing and hygiene are clean and worn appropriately.  Skin: Skin is warm and dry with good skin turgor; intact; color consistent with ethnicity.  Mucous membranes are moist.     Respiratory: Airway open and patent, respirations spontaneous, even and unlabored. No accessory muscles in use.   Cardiac: Regular rate and rhythm, good pulses palpated peripherally, capillary refill < 3 seconds.  Abdomen: Soft, non-tender to palpation. No distention noted.  Neurologic: PERRLA, face exhibits symmetrical expression, hand grasps equal and even bilaterally, normal sensation noted to all extremities and face when touched by a finger.

## 2017-05-02 NOTE — ED NOTES
Patient states he is having chest pain that occurred before he came to the hospital. This is the first mention of chest pain per the patient. Gregor notified. EKG ordered.

## 2017-05-02 NOTE — DISCHARGE INSTRUCTIONS
Gastroparesis     Gastroparesis means that food and fluids move too slowly out of the stomach into the duodenum.   Gastroparesis (also called delayed gastric emptying) happens when the stomach takes longer than normal to empty of food. This is due to a problem with motility (the movement of the muscles in the digestive tract). For many people, gastroparesis is a lifelong condition. But treatment can help relieve symptoms and prevent complications. Read on to learn more about gastroparesis and how it can be managed.  How gastroparesis develops  With normal motility, signals from nerves tell the stomach muscles when to contract. These muscles move food from the stomach into the duodenum (the first part of the small bowel). With gastroparesis, the nerves or muscles are damaged. This causes motility to slow down or stop completely. As a result, food cannot move from the stomach properly. This delayed emptying can cause nausea, vomiting, and other symptoms. Malnutrition can result. Bezoars (hardened lumps of food) can form in the stomach and cause other complications as well.  Causes of gastroparesis  Gastroparesis can be caused by any of the following:  · Diabetes  · Surgery involving any of the digestive organs, such as the stomach and bowels  · Certain medicines, such as strong pain medicines (narcotics)  · Certain conditions, such as systemic scleroderma, Parkinson disease, and thyroid disease  · After a viral illness  In many cases, the cause of gastroparesis cannot be found.  Signs and symptoms of gastroparesis  These can include:  · Nausea and vomiting  · Feeling full quickly when eating  · Belly pain  · Heartburn  · Belly bloating  · Weight loss  · Loss of appetite  · High and low blood sugar levels (in people with diabetes)  Diagnosing gastroparesis  Your healthcare provider will ask about your symptoms and health history. Youll also be examined. In addition, blood tests and X-rays are often done to check  your health and rule out other problems. To confirm the problem, you may need other tests as well. These can include:  · Upper endoscopy. This is done to see inside the stomach and duodenum. For the test, an endoscope is used. This is a thin, flexible tube with a tiny camera on the end. Its inserted through the mouth and down into the stomach and duodenum.  · Upper gastrointestinal (GI) series. This is done to take X-rays of the upper GI tract from the mouth to the small bowel. For the test, a substance called barium is used. The barium coats the upper GI tract so that it will show up clearly on X-rays.  · Gastric emptying scan. This is done to measure how quickly food leaves the stomach. For the test, a meal containing a harmless radioactive substance (tracer) is eaten. Then scans of the stomach are done. The tracer shows up clearly on the scans and shows the movement of the food through the stomach.  · Antroduodenal manometry. This test gives pressure measurements of the stomach and small intestine to check how the contractions are working.  · Newer tests. These are being created and include breath tests and wireless capsule studies   Treating gastroparesis  The goal of treatment is to help you manage your condition. Treatment may include one or more of the following:  · Dietary changes. You may need to make changes to your eating habits and daily diet. For instance, your healthcare provider may instruct you to eat small meals throughout the day. Doing this can keep you from feeling full too quickly. You may be placed on a liquid or soft diet. This means youll eat liquid foods or foods that are mashed or put through a . In addition, you may need to avoid foods high in fats and fiber. These can slow digestion. For more help with your diet, your healthcare provider can refer you to a dietitian. In severe cases, you may need a feeding tube. This sends liquid food or medicine directly to your small bowel,  bypassing the stomach.  · Treating diabetes. If you are diabetic, it is important to control your blood sugar. High sugar levels worsen gastroparesis.   · Medicines. These can help manage symptoms, such as nausea and vomiting. They can also improve motility. Each medicine has specific risks and side effects. Your doctor can tell you more about any medicine that is prescribed for you.  · Surgery. You may need to have a tube surgically inserted into the stomach. The tube removes excess air and fluid. This can relieve severe symptoms of nausea and vomiting. In rare cases, other surgery may be needed on the stomach or small bowel. This is to create a new passageway for food to be emptied from the stomach.  · Gastric electrical stimulation. This treatment is done less often and may not be available. Your healthcare provider can tell you more about this treatment if it is a choice for you.  Diabetes and gastroparesis  If you have diabetes, gastroparesis can make it harder to manage your blood sugar level. Youll need to take extra steps in your treatment to prevent complications. Work with your healthcare provider to learn what you can do to protect your health. For more information, contact the American Diabetes Association, www.diabetes.org.   Long-term concerns   With treatment, most people can manage their symptoms and maintain their usual routines. If your symptoms are moderate to severe, you may need to see your healthcare provider more often for checkups. Also, other treatments will likely be needed.  Date Last Reviewed: 7/14/2016  © 0781-0878 [employer]. [address] [place] [address], [place] [address]. All rights reserved. This information is not intended as a substitute for professional medical care. Always follow your healthcare professional's instructions.

## 2017-06-12 ENCOUNTER — OFFICE VISIT (OUTPATIENT)
Dept: OPHTHALMOLOGY | Facility: CLINIC | Age: 72
End: 2017-06-12
Payer: MEDICARE

## 2017-06-12 DIAGNOSIS — Z79.4 TYPE 2 DIABETES MELLITUS WITH BOTH EYES AFFECTED BY PROLIFERATIVE RETINOPATHY AND MACULAR EDEMA, WITH LONG-TERM CURRENT USE OF INSULIN: Primary | ICD-10-CM

## 2017-06-12 DIAGNOSIS — E11.3513 TYPE 2 DIABETES MELLITUS WITH BOTH EYES AFFECTED BY PROLIFERATIVE RETINOPATHY AND MACULAR EDEMA, WITH LONG-TERM CURRENT USE OF INSULIN: Primary | ICD-10-CM

## 2017-06-12 PROCEDURE — 92134 CPTRZ OPH DX IMG PST SGM RTA: CPT | Mod: PBBFAC,PO | Performed by: OPHTHALMOLOGY

## 2017-06-12 PROCEDURE — 67028 INJECTION EYE DRUG: CPT | Mod: PBBFAC,PO,RT | Performed by: OPHTHALMOLOGY

## 2017-06-12 PROCEDURE — 67028 INJECTION EYE DRUG: CPT | Mod: S$PBB,RT,, | Performed by: OPHTHALMOLOGY

## 2017-06-12 PROCEDURE — 99499 UNLISTED E&M SERVICE: CPT | Mod: S$PBB,,, | Performed by: OPHTHALMOLOGY

## 2017-06-12 PROCEDURE — 99999 PR PBB SHADOW E&M-EST. PATIENT-LVL III: CPT | Mod: PBBFAC,,, | Performed by: OPHTHALMOLOGY

## 2017-06-12 PROCEDURE — 99213 OFFICE O/P EST LOW 20 MIN: CPT | Mod: PBBFAC,PO | Performed by: OPHTHALMOLOGY

## 2017-06-12 RX ADMIN — AFLIBERCEPT 2 MG: 40 INJECTION, SOLUTION INTRAVITREAL at 11:06

## 2017-06-12 NOTE — PROGRESS NOTES
===============================  06/12/2017   Mono Bergman,   71 y.o. male   Last visit [facility]: :4/19/2017   Last visit eye dept. 4/19/2017  [facility]:  Corrected distance visual acuity was 20/30 in the right eye and 20/200 in the left eye.   Not recorded        Wearing Rx     Wearing Rx       Sphere Cylinder Axis Add    Right [place] +0.50 025 +2.50    Left [place] +0.50 030 +2.50    Type:  Bifocal              Manifest Refraction     Manifest Refraction       Sphere Cylinder Axis Add    Right [place] +0.50 025 +2.50    Left [place] +0.50 030 +2.50              Chief Complaint   Patient presents with    PDR/ME     2 MONTHS CHECK UP     Ophthalmic Medications     Ophthalmic - Anti-inflammatory, Glucocorticoids Start End    prednisoLONE acetate (PRED FORTE) 1 % DrpS 4/5/2017 4/5/2018    Sig: Place 1 drop into both eyes 4 (four) times daily.    Route: Both Eyes    prednisoLONE sodium phosphate (INFLAMASE FORTE) 1 % Drop 4/19/2017 6/18/2017    Sig: Place 1 drop into both eyes 4 (four) times daily.    Route: Both Eyes    Ophthalmic - Anti-inflammatory, NSAIDs Start End    ketorolac 0.5% (ACULAR) 0.5 % Drop 4/5/2017 4/5/2018    Sig: Place 1 drop into both eyes 4 (four) times daily.    Route: Both Eyes         HPI     PDR/ME    Additional comments: 2 MONTHS CHECK UP           Comments   1.) + DM SINCE 1997  2.) + PDR OU/ CSME OS  Bilateral PRP  PPV OS with Dr. 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 TEARS BID OS  REFRESH GEL BID OS   OMEGA E BID P.O.    EYLEA OS 11/4/16 12/1/16 1/18/17 2/22/17 4/19/17  EYLEA OD 4/5/17       Last edited by Laya Carvalho on 6/12/2017 10:31 AM. (History)      Read Studies: y  Vitalsy  ________________  6/12/2017  Problem List Items Addressed This Visit        Eye/Vision problems    Type 2 diabetes mellitus with both eyes  affected by proliferative retinopathy and macular edema, with long-term current use of insulin - Primary    Relevant Medications    aflibercept Soln 2 mg (Completed)    Other Relevant Orders    Posterior Segment OCT Retina-Both eyes (Completed)      Other Visit Diagnoses    None.       .  lolst to fu x 2 months - recently discharged from psychiatric evaluation  Difficulty with transportation at times, but patient prefers one eye at a time     treated bilateral dme- alternating  tx   oct od broader base - less thickness   ops better dme  Os NI after ppv x3 - suspect ischemia    Recommend OD Eylea maintenance today    6/12/2017  Diagnosis :  od dme  Today:   Eylea (afibercept) 2 mg/0.05 ml Intravitreal Injection , OD   Follow up: rtc 2 weeks          Call 24/7 for any worsening of vision. Check  OU QD. Gave my home phone number.      Procedure  Note:   OD}  Eylea (afibercept) 2 mg/0.05 ml Intravitreal Injection    I have explained the Risks, Benefits and Alternatives of the procedure in detail.  The patient voices understanding and all questions have been answered.  The patient agrees to proceed as discussed.  LIDOCAINE 2%  subconj bleb  was used for anesthesia.  Topical betadine was used for antisepsis.  0.05 cc was  injected 3.7 mm from corneal limbus in the inferotemporal quadrant.  Following injection the IOP was less than thirty (<30) by tonopen.  The eye was then thoroughly irrigated with BSS.  Patient tolerated procedure well.  No complications were observed.  The Patient was educated that mild irritation tonight was normal secondary to topical antispsis use.  Pt was advised to call at any time day or night for pain, redness, or any decline in vision. I gave the patient my home number as well as the clinic on call number. Daily visual checks and Amsler grid testing were reviewed.  vigamox Antibiotic Drops to be used 4 times daily for 4 days  VALERIANO Tucker MD  Procedure ordered: y  Consent: y  Pre auth:  y  MAR:y  Opnote: y  Charge capture:y  Sided procedure note: y           ===========================

## 2017-06-12 NOTE — PATIENT INSTRUCTIONS
vigamox (brown top, red box) 1 drop in right eye 4 times daily for 4 days, then stop.    Ketorolac (grey top, white box)1 drop in both eyes 4 times daily.    Pred forte (blue and white box) (Prednisolone) 1 drop in both eyes 4 times daily for 4 days.    Genteal 1 drop in BOTH eyes 4 times daily or more for Dry eye.    Refresh PM 1 application in BOTH eyes AT BEDTIME

## 2017-07-03 ENCOUNTER — OFFICE VISIT (OUTPATIENT)
Dept: OPHTHALMOLOGY | Facility: CLINIC | Age: 72
End: 2017-07-03
Payer: MEDICARE

## 2017-07-03 DIAGNOSIS — E11.3513 TYPE 2 DIABETES MELLITUS WITH BOTH EYES AFFECTED BY PROLIFERATIVE RETINOPATHY AND MACULAR EDEMA, WITH LONG-TERM CURRENT USE OF INSULIN: Primary | ICD-10-CM

## 2017-07-03 DIAGNOSIS — Z79.4 TYPE 2 DIABETES MELLITUS WITH BOTH EYES AFFECTED BY PROLIFERATIVE RETINOPATHY AND MACULAR EDEMA, WITH LONG-TERM CURRENT USE OF INSULIN: Primary | ICD-10-CM

## 2017-07-03 PROCEDURE — 67028 INJECTION EYE DRUG: CPT | Mod: PBBFAC,PO,LT | Performed by: OPHTHALMOLOGY

## 2017-07-03 PROCEDURE — 99212 OFFICE O/P EST SF 10 MIN: CPT | Mod: PBBFAC,PO,25 | Performed by: OPHTHALMOLOGY

## 2017-07-03 PROCEDURE — 99999 PR PBB SHADOW E&M-EST. PATIENT-LVL II: CPT | Mod: PBBFAC,,, | Performed by: OPHTHALMOLOGY

## 2017-07-03 PROCEDURE — 99499 UNLISTED E&M SERVICE: CPT | Mod: S$PBB,,, | Performed by: OPHTHALMOLOGY

## 2017-07-03 PROCEDURE — 67028 INJECTION EYE DRUG: CPT | Mod: S$PBB,LT,, | Performed by: OPHTHALMOLOGY

## 2017-07-03 RX ADMIN — AFLIBERCEPT 2 MG: 40 INJECTION, SOLUTION INTRAVITREAL at 10:07

## 2017-07-03 NOTE — PROGRESS NOTES
===============================  07/03/2017   Mono Bergman,   71 y.o. male   Last visit [facility]: :6/12/2017   Last visit eye dept. 6/12/2017  [facility]:  Corrected distance visual acuity was 20/40 in the right eye and 20/200 +1 in the left eye.   Not recorded        Wearing Rx     Wearing Rx       Sphere Cylinder Axis Add    Right Mobile +0.50 025 +2.50    Left Mobile +0.50 030 +2.50    Type:  Bifocal               Not recorded        Chief Complaint   Patient presents with    Follow-up     2 wk Eylea OD chk     Ophthalmic Medications     Ophthalmic - Anti-inflammatory, Glucocorticoids Start End    prednisoLONE acetate (PRED FORTE) 1 % DrpS 4/5/2017 4/5/2018    Sig: Place 1 drop into both eyes 4 (four) times daily.    Route: Both Eyes    Ophthalmic - Anti-inflammatory, NSAIDs Start End    ketorolac 0.5% (ACULAR) 0.5 % Drop 4/5/2017 4/5/2018    Sig: Place 1 drop into both eyes 4 (four) times daily.    Route: Both Eyes         HPI     Follow-up    Additional comments: 2 wk Eylea OD chk           Comments   Pt states that left eye seems to be a bit irritated. The right one is   starting to water a lot. Pt uses AT's to keep them lubricated.     1.) + DM SINCE 1997  2.) + PDR OU/ CSME OS  Bilateral PRP  PPV OS with Dr. 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 TEARS BID OS  REFRESH GEL BID OS   OMEGA E BID P.O.    EYLEA OS 11/4/16 12/1/16 1/18/17 2/22/17 4/19/17  EYLEA OD 4/5/17       Last edited by Geoffrey Coello, Patient Care Assistant on 7/3/2017  9:55   AM. (History)      Read Studies: y  Vitalsy  ________________  7/3/2017  Problem List Items Addressed This Visit        Eye/Vision problems    Type 2 diabetes mellitus with both eyes affected by proliferative retinopathy and macular edema, with long-term current use of insulin - Primary    Relevant  Medications    aflibercept Soln 2 mg (Completed)      Other Visit Diagnoses    None.       .  .  Oct od sl worse   Os stable oct  April rec tx dme ou   2 weeks post eyklea od   alternatingv tx     OU worse in April now alternating treatment    Continue alternating treatment to plateau for 2-3  Today Eylea OS        7/3/2017  Diagnosis :  Os dme  Today:   Eylea (afibercept) 2 mg/0.05 ml Intravitreal Injection , OS   Follow up: rtc 2 weeks back to od  tx to plateau          Call 24/7 for any worsening of vision. Check  OU QD. Gave my home phone number.      Procedure  Note:   OS}  Eylea (afibercept) 2 mg/0.05 ml Intravitreal Injection    I have explained the Risks, Benefits and Alternatives of the procedure in detail.  The patient voices understanding and all questions have been answered.  The patient agrees to proceed as discussed.  LIDOCAINE 2%  subconj bleb  was used for anesthesia.  Topical betadine was used for antisepsis.  0.05 cc was  injected 3.7 mm from corneal limbus in the inferotemporal quadrant.  Following injection the IOP was less than thirty (<30) by tonopen.  The eye was then thoroughly irrigated with BSS.  Patient tolerated procedure well.  No complications were observed.  The Patient was educated that mild irritation tonight was normal secondary to topical antispsis use.  Pt was advised to call at any time day or night for pain, redness, or any decline in vision. I gave the patient my home number as well as the clinic on call number. Daily visual checks and Amsler grid testing were reviewed.  ciloxan Antibiotic Drops to be used 4 times daily for 4 days  VALERIANO Tucker MD  Procedure ordered: y  Consent: y  Pre auth: y  MAR:y  Opnote: y  Charge capture:y  Sided procedure note: y       ===========================

## 2017-09-05 ENCOUNTER — OFFICE VISIT (OUTPATIENT)
Dept: OPHTHALMOLOGY | Facility: CLINIC | Age: 72
End: 2017-09-05
Payer: MEDICARE

## 2017-09-05 DIAGNOSIS — E11.3513 TYPE 2 DIABETES MELLITUS WITH BOTH EYES AFFECTED BY PROLIFERATIVE RETINOPATHY AND MACULAR EDEMA, WITH LONG-TERM CURRENT USE OF INSULIN: Primary | ICD-10-CM

## 2017-09-05 DIAGNOSIS — H04.123 BILATERAL DRY EYES: ICD-10-CM

## 2017-09-05 DIAGNOSIS — I10 ESSENTIAL HYPERTENSION: ICD-10-CM

## 2017-09-05 DIAGNOSIS — Z79.4 TYPE 2 DIABETES MELLITUS WITH BOTH EYES AFFECTED BY PROLIFERATIVE RETINOPATHY AND MACULAR EDEMA, WITH LONG-TERM CURRENT USE OF INSULIN: Primary | ICD-10-CM

## 2017-09-05 PROCEDURE — 99999 PR PBB SHADOW E&M-EST. PATIENT-LVL III: CPT | Mod: PBBFAC,,, | Performed by: OPHTHALMOLOGY

## 2017-09-05 PROCEDURE — 92014 COMPRE OPH EXAM EST PT 1/>: CPT | Mod: 25,S$PBB,, | Performed by: OPHTHALMOLOGY

## 2017-09-05 PROCEDURE — 67028 INJECTION EYE DRUG: CPT | Mod: PBBFAC,PO,RT | Performed by: OPHTHALMOLOGY

## 2017-09-05 PROCEDURE — 92225 PR SPECIAL EYE EXAM, INITIAL: CPT | Mod: 50,PBBFAC,PO | Performed by: OPHTHALMOLOGY

## 2017-09-05 PROCEDURE — 99213 OFFICE O/P EST LOW 20 MIN: CPT | Mod: PBBFAC,PO | Performed by: OPHTHALMOLOGY

## 2017-09-05 PROCEDURE — 92225 PR SPECIAL EYE EXAM, INITIAL: CPT | Mod: 59,S$PBB,LT, | Performed by: OPHTHALMOLOGY

## 2017-09-05 PROCEDURE — 67028 INJECTION EYE DRUG: CPT | Mod: S$PBB,RT,, | Performed by: OPHTHALMOLOGY

## 2017-09-05 PROCEDURE — 92134 CPTRZ OPH DX IMG PST SGM RTA: CPT | Mod: PBBFAC,PO | Performed by: OPHTHALMOLOGY

## 2017-09-05 RX ORDER — CIPROFLOXACIN HYDROCHLORIDE 3 MG/ML
1 SOLUTION/ DROPS OPHTHALMIC 4 TIMES DAILY
Qty: 5 ML | Refills: 0 | Status: SHIPPED | OUTPATIENT
Start: 2017-09-05 | End: 2017-09-05 | Stop reason: SDUPTHER

## 2017-09-05 RX ORDER — CIPROFLOXACIN HYDROCHLORIDE 3 MG/ML
1 SOLUTION/ DROPS OPHTHALMIC 4 TIMES DAILY
Qty: 5 ML | Refills: 0 | Status: SHIPPED | OUTPATIENT
Start: 2017-09-05 | End: 2017-09-09

## 2017-09-05 RX ORDER — PROMETHAZINE HYDROCHLORIDE 12.5 MG/1
12.5 TABLET ORAL
COMMUNITY
End: 2022-04-12

## 2017-09-05 RX ADMIN — AFLIBERCEPT 2 MG: 40 INJECTION, SOLUTION INTRAVITREAL at 09:09

## 2017-09-05 NOTE — PROGRESS NOTES
===============================  09/05/2017   Mono Bergman,   72 y.o. male   Last visit [facility]: :7/3/2017   Last visit eye dept. 7/3/2017  [facility]:  Corrected distance visual acuity was 20/80 in the right eye and 20/400 in the left eye.  Tonometry     Tonometry (Applanation, 8:48 AM)       Right Left    Pressure 8 11               Not recorded         Not recorded        Chief Complaint   Patient presents with    PDR/ME     pt states that on Saturday morning he woke up and could not see out of both eyes.  today is a little better but blurry     Ophthalmic Medications     Ophthalmic - Anti-inflammatory, Glucocorticoids Start End    prednisoLONE acetate (PRED FORTE) 1 % DrpS 4/5/2017 4/5/2018    Sig: Place 1 drop into both eyes 4 (four) times daily.    Route: Both Eyes    Ophthalmic - Anti-inflammatory, NSAIDs Start End    ketorolac 0.5% (ACULAR) 0.5 % Drop 4/5/2017 4/5/2018    Sig: Place 1 drop into both eyes 4 (four) times daily.    Route: Both Eyes         HPI     PDR/ME    Additional comments: pt states that on Saturday morning he woke up and   could not see out of both eyes.  today is a little better but blurry           Comments   1.) + DM SINCE 1997  2.) + PDR OU/ CSME OS  Bilateral PRP  PPV OS with Dr. 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 TEARS BID OS  REFRESH GEL BID OS   OMEGA E BID P.O.    EYLEA OS 11/4/16 12/1/16 1/18/17 2/22/17 4/19/17 7/3/17  EYLEA OD 6/12/17       Last edited by Laya Carvalho on 9/5/2017  8:43 AM. (History)      Read Studies: y  Vitalsy  ________________  9/5/2017  Problem List Items Addressed This Visit        Eye/Vision problems    Type 2 diabetes mellitus with both eyes affected by proliferative retinopathy and macular edema, with long-term current use of insulin - Primary  Worse today, recommend eylea ou     Relevant Medications    aflibercept Soln 2 mg (Completed)    ciprofloxacin HCl (CILOXAN) 0.3 % ophthalmic solution    Other Relevant Orders    Posterior Segment OCT Retina-Both eyes (Completed)    Prior Authorization Order       Other    HTN (hypertension) no htnr    Bilateral dry eyes continue systane frequently and refresh pm ou qhs      Other Visit Diagnoses    None.       .   od worse dme   needs eyela now - noncompliant with visits, lost to follow up  ^^ BGs  Recent hosp  Much worse ou dme  rec simultaneous tx, pt. Defers, prefers alternating tx   Then julioin '    9/5/2017  Diagnosis :  Od dme  Today:   Eylea (afibercept) 2 mg/0.05 ml Intravitreal Injection , OD   Follow up: rtc 2 weeks os  and check refraction         Call 24/7 for any worsening of vision. Check  OU QD. Gave my home phone number.      Procedure  Note:   OD}  Eylea (afibercept) 2 mg/0.05 ml Intravitreal Injection    I have explained the Risks, Benefits and Alternatives of the procedure in detail.  The patient voices understanding and all questions have been answered.  The patient agrees to proceed as discussed.  LIDOCAINE 2%  subconj bleb  was used for anesthesia.  Topical betadine was used for antisepsis.  0.05 cc was  injected 3.7 mm from corneal limbus in the inferotemporal quadrant.  Following injection the IOP was less than thirty (<30) by tonopen.  The eye was then thoroughly irrigated with BSS.  Patient tolerated procedure well.  No complications were observed.  The Patient was educated that mild irritation tonight was normal secondary to topical antispsis use.  Pt was advised to call at any time day or night for pain, redness, or any decline in vision. I gave the patient my home number as well as the clinic on call number. Daily visual checks and Amsler grid testing were reviewed.  ciloxan Antibiotic Drops to be used 4 times daily for 4 days  VALERIANO Tucker MD  Procedure ordered: y  Consent: y  Pre auth: y  MAR:y  Opnote:  y  Charge capture:y  Sided procedure note: y           ===========================

## 2017-09-13 ENCOUNTER — PROCEDURE VISIT (OUTPATIENT)
Dept: OPHTHALMOLOGY | Facility: CLINIC | Age: 72
End: 2017-09-13
Payer: MEDICARE

## 2017-09-13 DIAGNOSIS — E11.3513 TYPE 2 DIABETES MELLITUS WITH BOTH EYES AFFECTED BY PROLIFERATIVE RETINOPATHY AND MACULAR EDEMA, WITH LONG-TERM CURRENT USE OF INSULIN: Primary | ICD-10-CM

## 2017-09-13 DIAGNOSIS — Z79.4 TYPE 2 DIABETES MELLITUS WITH BOTH EYES AFFECTED BY PROLIFERATIVE RETINOPATHY AND MACULAR EDEMA, WITH LONG-TERM CURRENT USE OF INSULIN: Primary | ICD-10-CM

## 2017-09-13 PROCEDURE — 67028 INJECTION EYE DRUG: CPT | Mod: S$PBB,LT,, | Performed by: OPHTHALMOLOGY

## 2017-09-13 PROCEDURE — 92134 CPTRZ OPH DX IMG PST SGM RTA: CPT | Mod: PBBFAC,PO | Performed by: OPHTHALMOLOGY

## 2017-09-13 PROCEDURE — 99499 UNLISTED E&M SERVICE: CPT | Mod: S$PBB,,, | Performed by: OPHTHALMOLOGY

## 2017-09-13 PROCEDURE — 67028 INJECTION EYE DRUG: CPT | Mod: PBBFAC,PO,LT | Performed by: OPHTHALMOLOGY

## 2017-09-13 RX ORDER — CIPROFLOXACIN HYDROCHLORIDE 3 MG/ML
SOLUTION/ DROPS OPHTHALMIC
Qty: 5 ML | Refills: 0 | Status: SHIPPED | OUTPATIENT
Start: 2017-09-13 | End: 2017-09-20

## 2017-09-13 RX ADMIN — AFLIBERCEPT 2 MG: 40 INJECTION, SOLUTION INTRAVITREAL at 09:09

## 2017-09-13 NOTE — PROGRESS NOTES
===============================  09/13/2017   Mono Bergman,   72 y.o. male   Last visit [facility]: :9/5/2017   Last visit eye dept. 9/5/2017  [facility]:  Corrected distance visual acuity was 20/80 in the right eye and 20/400 in the left eye.   Not recorded         Not recorded         Not recorded        Chief Complaint   Patient presents with    PDR/ME     EYLEA  OS     Ophthalmic Medications     Ophthalmic - Anti-inflammatory, Glucocorticoids Start End    prednisoLONE acetate (PRED FORTE) 1 % DrpS 4/5/2017 4/5/2018    Sig: Place 1 drop into both eyes 4 (four) times daily.    Route: Both Eyes    Ophthalmic - Anti-inflammatory, NSAIDs Start End    ketorolac 0.5% (ACULAR) 0.5 % Drop 4/5/2017 4/5/2018    Sig: Place 1 drop into both eyes 4 (four) times daily.    Route: Both Eyes         HPI     PDR/ME    Additional comments: EYLEA  OS           Comments   1.) + DM SINCE 1997  2.) + PDR OU/ CSME OS  Bilateral PRP  PPV OS with Dr. 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 TEARS BID OS  REFRESH GEL BID OS   OMEGA E BID P.O.    EYLEA OS 11/4/16 12/1/16 1/18/17 2/22/17 4/19/17 7/3/17  EYLEA OD 9/5/17       Last edited by Laya Carvalho on 9/13/2017  8:40 AM. (History)      Read Studies: y  Vitalsy  ________________  9/13/2017  Problem List Items Addressed This Visit     None      Visit Diagnoses    None.         .  Ni w rfx  recnt ou worswe dme  Likes +5  od oct vbetter   Os persisetnt me    9/13/2017  Diagnosis :  Os dme  Today:   Eylea (afibercept) 2 mg/0.05 ml Intravitreal Injection , OS   Follow up: rtc 2 weekd back to od           Call 24/7 for any worsening of vision. Check  OU QD. Gave my home phone number.      Procedure  Note:   OD}  Eylea (afibercept) 2 mg/0.05 ml Intravitreal Injection    I have explained the Risks, Benefits and Alternatives of  the procedure in detail.  The patient voices understanding and all questions have been answered.  The patient agrees to proceed as discussed.  LIDOCAINE 2%  Sub conj bleb  was used for anesthesia.  Topical betadine was used for antisepsis.  0.05 cc was  injected 3.7 mm from corneal limbus in the inferotemporal quadrant.  Following injection the IOP was less than thirty (<30) by tonopen.  The eye was then thoroughly irrigated with BSS.  Patient tolerated procedure well.  No complications were observed.  The Patient was educated that mild irritation tonight was normal secondary to topical antispsis use.  Pt was advised to call at any time day or night for pain, redness, or any decline in vision. I gave the patient my home number as well as the clinic on call number. Daily visual checks and Amsler grid testing were reviewed.  ciloxan Antibiotic Drops to be used 4 times daily for 4 days  VALERIANO Tucker MD  Procedure ordered: y  Consent: y  Pre auth: y  MAR:y  Opnote: y  Charge capture:y  Sided procedure note: y       ===========================

## 2018-07-24 ENCOUNTER — TELEPHONE (OUTPATIENT)
Dept: OPHTHALMOLOGY | Facility: CLINIC | Age: 73
End: 2018-07-24

## 2018-07-24 NOTE — TELEPHONE ENCOUNTER
----- Message from Angelica Beasley sent at 7/24/2018 11:39 AM CDT -----  Contact: pt   States he's calling to schedule with Dr Tucker and can be reached at [phone]//[email or web address]

## 2018-08-17 ENCOUNTER — OFFICE VISIT (OUTPATIENT)
Dept: OPHTHALMOLOGY | Facility: CLINIC | Age: 73
End: 2018-08-17
Payer: MEDICARE

## 2018-08-17 DIAGNOSIS — E11.3553 TYPE 2 DIABETES MELLITUS WITH STABLE PROLIFERATIVE RETINOPATHY OF BOTH EYES, WITH LONG-TERM CURRENT USE OF INSULIN: Primary | ICD-10-CM

## 2018-08-17 DIAGNOSIS — Z79.4 TYPE 2 DIABETES MELLITUS WITH STABLE PROLIFERATIVE RETINOPATHY OF BOTH EYES, WITH LONG-TERM CURRENT USE OF INSULIN: Primary | ICD-10-CM

## 2018-08-17 PROCEDURE — 92134 CPTRZ OPH DX IMG PST SGM RTA: CPT | Mod: PBBFAC,PO | Performed by: OPHTHALMOLOGY

## 2018-08-17 PROCEDURE — 92014 COMPRE OPH EXAM EST PT 1/>: CPT | Mod: S$PBB,,, | Performed by: OPHTHALMOLOGY

## 2018-08-17 PROCEDURE — 99999 PR PBB SHADOW E&M-EST. PATIENT-LVL II: CPT | Mod: PBBFAC,,, | Performed by: OPHTHALMOLOGY

## 2018-08-17 PROCEDURE — 99212 OFFICE O/P EST SF 10 MIN: CPT | Mod: PBBFAC,PO | Performed by: OPHTHALMOLOGY

## 2018-08-17 NOTE — PROGRESS NOTES
===============================  08/17/2018   Mono Bergman,   73 y.o. male   Last visit [facility]: :9/13/2017   Last visit eye dept. Visit date not found  [facility]:  Corrected distance visual acuity was 20/200 in the right eye and 20/200 in the left eye.  Tonometry     Tonometry (Applanation, 10:44 AM)       Right Left    Pressure 12 18               Not recorded         Not recorded        Chief Complaint   Patient presents with    PDR/ME     11 MONTHS CHECK UP        HPI     PDR/ME      Additional comments: 11 MONTHS CHECK UP              Comments     1.) + DM SINCE 1997  2.) + PDR OU/ CSME OS  Bilateral PRP  PPV OS with Dr. 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 TEARS BID OS  REFRESH GEL BID OS   OMEGA E BID P.O.    EYLEA OS 11/4/16 12/1/16 1/18/17 2/22/17 4/19/17 7/3/17 9/13/17  EYLEA OD 9/5/17          Last edited by Laya Carvalho on 8/17/2018 10:22 AM. (History)          ________________  8/17/2018  Problem List Items Addressed This Visit        Eye/Vision problems    Type 2 diabetes mellitus with stable proliferative retinopathy of both eyes, with long-term current use of insulin - Primary    Relevant Orders    Posterior Segment OCT Retina-Both eyes (Completed)    Prior Authorization Order        Has been seen by dr pace in past year  Blind letter  .od sl beter  Than last year      Os worse    rec +5 or +6 for near  [facility] has given [facility] low vision devices       rec ou (or alternate ) eylea tx to p  titrateraginst acuoty INB> follow  ===========================

## 2018-09-20 ENCOUNTER — PROCEDURE VISIT (OUTPATIENT)
Dept: OPHTHALMOLOGY | Facility: CLINIC | Age: 73
End: 2018-09-20
Payer: MEDICARE

## 2018-09-20 DIAGNOSIS — E11.3553 TYPE 2 DIABETES MELLITUS WITH STABLE PROLIFERATIVE RETINOPATHY OF BOTH EYES, WITH LONG-TERM CURRENT USE OF INSULIN: Primary | ICD-10-CM

## 2018-09-20 DIAGNOSIS — Z79.4 TYPE 2 DIABETES MELLITUS WITH STABLE PROLIFERATIVE RETINOPATHY OF BOTH EYES, WITH LONG-TERM CURRENT USE OF INSULIN: Primary | ICD-10-CM

## 2018-09-20 PROCEDURE — 67028 INJECTION EYE DRUG: CPT | Mod: S$PBB,RT,, | Performed by: OPHTHALMOLOGY

## 2018-09-20 PROCEDURE — 99499 UNLISTED E&M SERVICE: CPT | Mod: S$PBB,,, | Performed by: OPHTHALMOLOGY

## 2018-09-20 PROCEDURE — 67028 INJECTION EYE DRUG: CPT | Mod: PBBFAC,PO,RT | Performed by: OPHTHALMOLOGY

## 2018-09-20 PROCEDURE — 92134 CPTRZ OPH DX IMG PST SGM RTA: CPT | Mod: PBBFAC,PO | Performed by: OPHTHALMOLOGY

## 2018-09-20 RX ORDER — POLYMYXIN B SULFATE AND TRIMETHOPRIM 1; 10000 MG/ML; [USP'U]/ML
1 SOLUTION OPHTHALMIC 4 TIMES DAILY
Qty: 10 ML | Refills: 0 | Status: SHIPPED | OUTPATIENT
Start: 2018-09-20 | End: 2018-10-20

## 2018-09-20 RX ADMIN — AFLIBERCEPT 2 MG: 40 INJECTION, SOLUTION INTRAVITREAL at 10:09

## 2018-09-20 NOTE — PROGRESS NOTES
===============================  09/20/2018   Mono Bergman,   73 y.o. male   Last visit [facility]: :8/17/2018   Last visit eye dept. 8/17/2018  [facility]:  Uncorrected distance visual acuity was 20/400 in the right eye and 20/200 in the left eye.   Not recorded         Not recorded         Not recorded        Chief Complaint   Patient presents with    PDR/ME     EYLEA OU        HPI     PDR/ME      Additional comments: EYLEA OU              Comments     1.) + DM SINCE 1997  2.) + PDR OU/ CSME OS  Bilateral PRP  PPV OS with Dr. 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 TEARS BID OS  REFRESH GEL BID OS   OMEGA E BID P.O.    EYLEA OS 11/4/16 12/1/16 1/18/17 2/22/17 4/19/17 7/3/17 9/13/17  EYLEA OD 9/5/17          Last edited by Laya Carvalho on 9/20/2018 10:05 AM. (History)          ________________  9/20/2018  Problem List Items Addressed This Visit        Eye/Vision problems    Type 2 diabetes mellitus with stable proliferative retinopathy of both eyes, with long-term current use of insulin - Primary    Relevant Medications    aflibercept Soln 2 mg    Other Relevant Orders    Prior Authorization Order    Posterior Segment OCT Retina-Both eyes (Completed)        Expect stable try ti mrpove w avgf tx to p '  od sig dme os  margina lpferes tx od only    follow  Os for  dme    9/20/2018  Diagnosis :  Od dme  Today:   Eylea (afibercept) 2 mg/0.05 ml Intravitreal Injection , OD   Follow up: rtc  2 weeks add prp         Instructed to call 24/7 for any worsening of vision. Check Both eyes daily. Gave patient my home phone number.      Procedure  Note:   OD}  Eylea (afibercept) 2 mg/0.05 ml Intravitreal Injection    I have explained the Risks, Benefits and Alternatives of the procedure in detail.  The patient voices understanding and all questions have been  answered.  The patient agrees to proceed as discussed.  Xylocaine with Epi 2%  subconj bleb  was used for anesthesia.  Topical betadine was used for antisepsis.  0.05 cc was  injected 3.7 mm from corneal limbus in the inferotemporal quadrant.  Following injection the IOP was less than thirty (<30) by tonopen.  The eye was then thoroughly irrigated with BSS.  Patient tolerated procedure well.  No complications were observed.  The Patient was educated that mild irritation tonight was normal secondary to topical antispsis use.  Pt was advised to call at any time day or night for pain, redness, or any decline in vision. I gave the patient my home number as well as the clinic on call number. Daily visual checks and Amsler grid testing were reviewed.  polytrim Antibiotic Drops to be used 4 times daily for 4 days  VALERIANO Tucker MD  Procedure ordered: y  Consent: y  Pre auth: y  MAR:y  Opnote: y  Charge capture:y      .       ===========================

## 2018-10-02 ENCOUNTER — PROCEDURE VISIT (OUTPATIENT)
Dept: OPHTHALMOLOGY | Facility: CLINIC | Age: 73
End: 2018-10-02
Payer: MEDICARE

## 2018-10-02 DIAGNOSIS — Z79.4 TYPE 2 DIABETES MELLITUS WITH STABLE PROLIFERATIVE RETINOPATHY OF BOTH EYES, WITH LONG-TERM CURRENT USE OF INSULIN: Primary | ICD-10-CM

## 2018-10-02 DIAGNOSIS — E11.3553 TYPE 2 DIABETES MELLITUS WITH STABLE PROLIFERATIVE RETINOPATHY OF BOTH EYES, WITH LONG-TERM CURRENT USE OF INSULIN: Primary | ICD-10-CM

## 2018-10-02 PROCEDURE — 67228 TREATMENT X10SV RETINOPATHY: CPT | Mod: S$PBB,RT,, | Performed by: OPHTHALMOLOGY

## 2018-10-02 PROCEDURE — 99499 UNLISTED E&M SERVICE: CPT | Mod: S$PBB,,, | Performed by: OPHTHALMOLOGY

## 2018-10-02 PROCEDURE — 67228 TREATMENT X10SV RETINOPATHY: CPT | Mod: PBBFAC,PO,RT | Performed by: OPHTHALMOLOGY

## 2018-10-02 NOTE — PROGRESS NOTES
10/02/2018   Mono Bergman,   73 y.o. male   [facility]:  Uncorrected distance visual acuity was 20/40 in the right eye and 20/100 in the left eye.  HPI     pdr      Additional comments: prp od              Comments     1.) + DM SINCE 1997  2.) + PDR OU/ CSME OS  Bilateral PRP  PPV OS with Dr. 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 TEARS BID OS  REFRESH GEL BID OS   OMEGA E BID P.O.    EYLEA OS 11/4/16 12/1/16 1/18/17 2/22/17 4/19/17 7/3/17 9/13/17  EYLEA OD 9/5/17 9/20/18          Last edited by GONZALO Keith on 10/2/2018 11:11 AM. (History)      10/2/2018  Problem List Items Addressed This Visit        Eye/Vision problems    Type 2 diabetes mellitus with stable proliferative retinopathy of both eyes, with long-term current use of insulin - Primary                                                          Proc Note:   PRP Laser OD  Dx: PDR  I have explained the Risks, Benefits and Alternatives, of the procedure in detail.  The patient voices understanding and all questions have been answered.  The patient agrees to proceed as planned.   Topical Anesthesia with Proparacaine  Laser: Yellow 577  Size 200 microns  Time: 100 milliseconds  Power: 740  Number: 485  Pt tolerated procedure well.  No complications were observed.    RTC 1 month in clinic    Wilberto Tucker MD.      
within normal limits

## 2018-11-05 ENCOUNTER — OFFICE VISIT (OUTPATIENT)
Dept: OPHTHALMOLOGY | Facility: CLINIC | Age: 73
End: 2018-11-05
Payer: MEDICARE

## 2018-11-05 DIAGNOSIS — E11.3553 TYPE 2 DIABETES MELLITUS WITH STABLE PROLIFERATIVE RETINOPATHY OF BOTH EYES, WITH LONG-TERM CURRENT USE OF INSULIN: Primary | ICD-10-CM

## 2018-11-05 DIAGNOSIS — Z79.4 TYPE 2 DIABETES MELLITUS WITH STABLE PROLIFERATIVE RETINOPATHY OF BOTH EYES, WITH LONG-TERM CURRENT USE OF INSULIN: Primary | ICD-10-CM

## 2018-11-05 DIAGNOSIS — E11.311 DIABETIC MACULAR EDEMA: ICD-10-CM

## 2018-11-05 PROCEDURE — 99999 PR PBB SHADOW E&M-EST. PATIENT-LVL II: CPT | Mod: PBBFAC,,, | Performed by: OPHTHALMOLOGY

## 2018-11-05 PROCEDURE — 92012 INTRM OPH EXAM EST PATIENT: CPT | Mod: S$PBB,,, | Performed by: OPHTHALMOLOGY

## 2018-11-05 PROCEDURE — 99212 OFFICE O/P EST SF 10 MIN: CPT | Mod: PBBFAC,PO,25 | Performed by: OPHTHALMOLOGY

## 2018-11-05 PROCEDURE — 92134 CPTRZ OPH DX IMG PST SGM RTA: CPT | Mod: PBBFAC,PO | Performed by: OPHTHALMOLOGY

## 2018-11-05 RX ORDER — POLYETHYLENE GLYCOL 3350, SODIUM CHLORIDE, SODIUM BICARBONATE, POTASSIUM CHLORIDE 420; 11.2; 5.72; 1.48 G/4L; G/4L; G/4L; G/4L
POWDER, FOR SOLUTION ORAL
COMMUNITY
Start: 2017-01-25 | End: 2019-10-03

## 2018-11-05 RX ORDER — NEOMYCIN SULFATE, POLYMYXIN B SULFATE AND DEXAMETHASONE 3.5; 10000; 1 MG/ML; [USP'U]/ML; MG/ML
SUSPENSION/ DROPS OPHTHALMIC
COMMUNITY
Start: 2017-01-18 | End: 2019-10-03

## 2018-11-05 RX ORDER — SUCRALFATE 1 G/10ML
500 SUSPENSION ORAL
COMMUNITY
End: 2019-10-03

## 2018-11-05 NOTE — PROGRESS NOTES
===============================  11/05/2018   Mono Bergman,   73 y.o. male   Last visit [facility]: :10/2/2018   Last visit eye dept. 10/2/2018  [facility]:  Uncorrected distance visual acuity was 20/40 in the right eye and 20/200 in the left eye.  Tonometry     Tonometry (Applanation, 10:13 AM)       Right Left    Pressure 14 16               Not recorded         Not recorded        Chief Complaint   Patient presents with    Diabetes     1 month re check BDR        HPI     Diabetes      Additional comments: 1 month re check BDR              Comments     1.) + DM SINCE 1997  2.) + PDR OU/ CSME OS  Bilateral PRP  PPV OS with Dr. 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 TEARS BID OS  REFRESH GEL BID OS   OMEGA E BID P.O.    PRP OD 10/2/18  EYLEA OS 9/13/17  EYLEA OD 9/20/18          Last edited by VALERIANO Tucker MD on 11/5/2018 10:30 AM. (History)          ________________  11/5/2018  Problem List Items Addressed This Visit        Eye/Vision problems    Type 2 diabetes mellitus with stable proliferative retinopathy of both eyes, with long-term current use of insulin - Primary    Relevant Orders    Posterior Segment OCT Retina-Both eyes    Prior Authorization Order    Diabetic macular edema    Relevant Orders    Posterior Segment OCT Retina-Both eyes    Prior Authorization Order          .persistent od 600 m focal macular edema  rec eylea tx. To plateau  Does not want both eyes treated same day  Recommend Eylea OD trial - INB quit       ===========================

## 2018-11-29 ENCOUNTER — PROCEDURE VISIT (OUTPATIENT)
Dept: OPHTHALMOLOGY | Facility: CLINIC | Age: 73
End: 2018-11-29
Payer: MEDICARE

## 2018-11-29 DIAGNOSIS — E11.3553 TYPE 2 DIABETES MELLITUS WITH STABLE PROLIFERATIVE RETINOPATHY OF BOTH EYES, WITH LONG-TERM CURRENT USE OF INSULIN: Primary | ICD-10-CM

## 2018-11-29 DIAGNOSIS — Z79.4 TYPE 2 DIABETES MELLITUS WITH STABLE PROLIFERATIVE RETINOPATHY OF BOTH EYES, WITH LONG-TERM CURRENT USE OF INSULIN: Primary | ICD-10-CM

## 2018-11-29 PROCEDURE — 67028 INJECTION EYE DRUG: CPT | Mod: S$PBB,RT,, | Performed by: OPHTHALMOLOGY

## 2018-11-29 PROCEDURE — 67028 INJECTION EYE DRUG: CPT | Mod: PBBFAC,PO,RT | Performed by: OPHTHALMOLOGY

## 2018-11-29 PROCEDURE — 99499 UNLISTED E&M SERVICE: CPT | Mod: S$PBB,,, | Performed by: OPHTHALMOLOGY

## 2018-11-29 RX ORDER — POLYMYXIN B SULFATE AND TRIMETHOPRIM 1; 10000 MG/ML; [USP'U]/ML
1 SOLUTION OPHTHALMIC 4 TIMES DAILY
Qty: 10 ML | Refills: 0 | Status: SHIPPED | OUTPATIENT
Start: 2018-11-29 | End: 2018-12-29

## 2018-11-29 NOTE — PROGRESS NOTES
===============================  11/29/2018   Mono Bergman,   73 y.o. male   Last visit [facility]: :11/5/2018   Last visit eye dept. 11/5/2018  [facility]:  Uncorrected distance visual acuity was 20/40 in the right eye and 20/200 in the left eye.   Not recorded         Not recorded         Not recorded        Chief Complaint   Patient presents with    pdr     eylea od        HPI     pdr      Additional comments: eylea od              Comments     1.) + DM SINCE 1997  2.) + PDR OU/ CSME OS  Bilateral PRP  PPV OS with Dr. 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 TEARS BID OS  REFRESH GEL BID OS   OMEGA E BID P.O.    PRP OD 10/2/18  EYLEA OS 9/13/17  EYLEA OD 9/20/18          Last edited by GONZALO Keith on 11/29/2018  1:20 PM. (History)          ________________  11/29/2018  Problem List Items Addressed This Visit     None        od dme in beter eye     11/29/2018  Diagnosis :  od dme  Today:   Eylea (afibercept) 2 mg/0.05 ml Intravitreal Injection , OD   Follow up: rtc 1 mo        Instructed to call 24/7 for any worsening of vision. Check Both eyes daily. Gave patient my home phone number.      Procedure  Note:   OD}  Avastin (Bevacizumab) 1.25 mg/0.05 ml Intravitreal Injection    I have explained the Risks, Benefits and Alternatives of the procedure in detail.  The patient voices understanding and all questions have been answered.  The patient agrees to proceed as discussed.  Xylocaine with Epi 2%  subconj bleb  was used for anesthesia.  Topical betadine was used for antisepsis.  0.05 cc was  injected 3.7 mm from corneal limbus in the inferotemporal quadrant.  Following injection the IOP was less than thirty (<30) by tonopen.  The eye was then thoroughly irrigated with BSS.  Patient tolerated procedure well.  No complications were observed.  The  Patient was educated that mild irritation tonight was normal secondary to topical antispsis use.  Pt was advised to call at any time day or night for pain, redness, or any decline in vision. I gave the patient my home number as well as the clinic on call number. Daily visual checks and Amsler grid testing were reviewed.  polytrim Antibiotic Drops to be used 4 times daily for 4 days  VALERIANO Tucker MD  Procedure ordered: y  Consent: y  Pre auth: y  MAR:y  Opnote: y  Charge capture:y           ===========================

## 2019-02-07 ENCOUNTER — PROCEDURE VISIT (OUTPATIENT)
Dept: OPHTHALMOLOGY | Facility: CLINIC | Age: 74
End: 2019-02-07
Payer: MEDICARE

## 2019-02-07 DIAGNOSIS — Z79.4 TYPE 2 DIABETES MELLITUS WITH BOTH EYES AFFECTED BY PROLIFERATIVE RETINOPATHY AND MACULAR EDEMA, WITH LONG-TERM CURRENT USE OF INSULIN: Primary | ICD-10-CM

## 2019-02-07 DIAGNOSIS — E11.3513 TYPE 2 DIABETES MELLITUS WITH BOTH EYES AFFECTED BY PROLIFERATIVE RETINOPATHY AND MACULAR EDEMA, WITH LONG-TERM CURRENT USE OF INSULIN: Primary | ICD-10-CM

## 2019-02-07 PROCEDURE — 67028 INJECTION EYE DRUG: CPT | Mod: PBBFAC,PN,RT | Performed by: OPHTHALMOLOGY

## 2019-02-07 PROCEDURE — 92134 POSTERIOR SEGMENT OCT RETINA (OCULAR COHERENCE TOMOGRAPHY)-BOTH EYES: ICD-10-PCS | Mod: 26,S$PBB,, | Performed by: OPHTHALMOLOGY

## 2019-02-07 PROCEDURE — 67028 PR INJECT INTRAVITREAL PHARMCOLOGIC: ICD-10-PCS | Mod: S$PBB,RT,, | Performed by: OPHTHALMOLOGY

## 2019-02-07 PROCEDURE — 99499 NO LOS: ICD-10-PCS | Mod: S$PBB,,, | Performed by: OPHTHALMOLOGY

## 2019-02-07 PROCEDURE — 99499 UNLISTED E&M SERVICE: CPT | Mod: S$PBB,,, | Performed by: OPHTHALMOLOGY

## 2019-02-07 PROCEDURE — 92134 CPTRZ OPH DX IMG PST SGM RTA: CPT | Mod: PBBFAC,PN | Performed by: OPHTHALMOLOGY

## 2019-02-07 PROCEDURE — 67028 INJECTION EYE DRUG: CPT | Mod: S$PBB,RT,, | Performed by: OPHTHALMOLOGY

## 2019-02-07 RX ADMIN — AFLIBERCEPT 2 MG: 40 INJECTION, SOLUTION INTRAVITREAL at 01:02

## 2019-02-07 NOTE — PATIENT INSTRUCTIONS
Post Intravitreal Injection Instructions  Below are some guidelines for what to expect after your treatment and additional care instructions.   You may experience mild discomfort in your eye after injection.  Usually your eye feels better within 24-48 hours after the injection.     You may get a gritty, burning, irritating or stinging feeling in the injected eye as a result of the antiseptic used.  Use artificial tears or eye lubricant to reduce the sensation (systane, refresh, genteal, systane gel, genteal gel, refresh pm).  These are available over the counter from your local pharmacy.  If you already have some at home, be sure to check the expiration date to avoid contaminating your eye.  A cool pack over your eye brow above the injected eye may also relieve discomfort.     You have been given medication to numb the surface of your eye.  DO NOT rub or touch your eye, DO NOT wear contacts until numbness goes away.   You may experience small spots that appear in your field of vision.  These are usually caused by an air bubble or from the medication.  It takes several hours or days for these to go away.   You will have a bright red blood spot on the white part of your eye where the injection/anesthetic was given.  This is normal, and can even cover the entire white part of your eye.  This is just a bruise, and will fade away the same as a bruise; it may take a week or two.   DO NOT swim or put sink water (tap water) in your eyes for at least 4 days after injection. It is ok to wash your face and eyelids with a cloth.  It is ok to shower.   An antibiotic prescription has been given, use 1 drop in the injected eye 4 times daily for 4 days.   CALL with any PAIN!  Office [phone]   After hours call [phone]    Your next appointment is scheduled on Thursday March 7 @ 11:00

## 2019-02-07 NOTE — PROGRESS NOTES
===============================  02/07/2019   Mono Bergman,   73 y.o. male   Last visit [facility]: :11/29/2018   Last visit eye dept. Visit date not found  [facility]:  Uncorrected distance visual acuity was 20/40 in the right eye and 20/200 in the left eye.   Not recorded         Not recorded         Not recorded        Chief Complaint   Patient presents with    PDR     EYLEA OD        HPI     PDR      Additional comments: EYLEA OD              Comments     1.) + DM SINCE 1997  2.) + PDR OU/ CSME OS  Bilateral PRP  PPV OS with Dr. 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 TEARS BID OS  REFRESH GEL BID OS   OMEGA E BID P.O.  OCUVITE VITAMINS    PRP OD 10/2/18  EYLEA OS 9/13/17  EYLEA OD 11/29/18          Last edited by Laya Carvalho on 2/7/2019  1:13 PM. (History)          ________________  2/7/2019  Problem List Items Addressed This Visit        Eye/Vision problems    Type 2 diabetes mellitus with both eyes affected by proliferative retinopathy and macular edema, with long-term current use of insulin - Primary    Relevant Medications    aflibercept Soln 2 mg (Completed)    Other Relevant Orders    Posterior Segment OCT Retina-Both eyes (Completed)    Prior Authorization Order        o ddme very stable 20 /40     has not had month by month for essentially years  Recommend month to month avgf to plateau (may be soon)  2/7/2019  Diagnosis :  od dme  Today:   Eylea (afibercept) 2 mg/0.05 ml Intravitreal Injection , OD   Follow up: rtc  1 mo        Instructed to call 24/7 for any worsening of vision. Check Both eyes daily. Gave patient my home phone number.      Procedure  Note:   OD}  Eylea (afibercept) 2 mg/0.05 ml Intravitreal Injection    I have explained the Risks, Benefits and Alternatives of the procedure in detail.  The patient voices understanding  and all questions have been answered.  The patient agrees to proceed as discussed.  Xylocaine with Epi 2%  subconj bleb  was used for anesthesia.  Topical betadine was used for antisepsis.  0.05 cc was  injected 3.7 mm from corneal limbus in the inferotemporal quadrant.  Following injection the IOP was less than thirty (<30) by tonopen.  The eye was then thoroughly irrigated with BSS.  Patient tolerated procedure well.  No complications were observed.  The Patient was educated that mild irritation tonight was normal secondary to topical antispsis use.  Pt was advised to call at any time day or night for pain, redness, or any decline in vision. I gave the patient my home number as well as the clinic on call number. Daily visual checks and Amsler grid testing were reviewed.  polytrim Antibiotic Drops to be used 4 times daily for 4 days  VALERIANO Tucker MD  Procedure ordered: y  Consent: y  Pre auth: y  MAR:y  Opnote: y  Charge capture:y      .       ===========================

## 2019-03-07 ENCOUNTER — PROCEDURE VISIT (OUTPATIENT)
Dept: OPHTHALMOLOGY | Facility: CLINIC | Age: 74
End: 2019-03-07
Payer: MEDICARE

## 2019-03-07 DIAGNOSIS — E11.3513 TYPE 2 DIABETES MELLITUS WITH BOTH EYES AFFECTED BY PROLIFERATIVE RETINOPATHY AND MACULAR EDEMA, WITH LONG-TERM CURRENT USE OF INSULIN: Primary | ICD-10-CM

## 2019-03-07 DIAGNOSIS — Z79.4 TYPE 2 DIABETES MELLITUS WITH BOTH EYES AFFECTED BY PROLIFERATIVE RETINOPATHY AND MACULAR EDEMA, WITH LONG-TERM CURRENT USE OF INSULIN: Primary | ICD-10-CM

## 2019-03-07 PROCEDURE — 67028 INJECTION EYE DRUG: CPT | Mod: S$PBB,RT,, | Performed by: OPHTHALMOLOGY

## 2019-03-07 PROCEDURE — 92134 POSTERIOR SEGMENT OCT RETINA (OCULAR COHERENCE TOMOGRAPHY)-BOTH EYES: ICD-10-PCS | Mod: 26,S$PBB,, | Performed by: OPHTHALMOLOGY

## 2019-03-07 PROCEDURE — 92134 CPTRZ OPH DX IMG PST SGM RTA: CPT | Mod: PBBFAC,PN | Performed by: OPHTHALMOLOGY

## 2019-03-07 PROCEDURE — 67028 INJECTION EYE DRUG: CPT | Mod: PBBFAC,PN,RT | Performed by: OPHTHALMOLOGY

## 2019-03-07 PROCEDURE — 99499 NO LOS: ICD-10-PCS | Mod: S$PBB,,, | Performed by: OPHTHALMOLOGY

## 2019-03-07 PROCEDURE — 99499 UNLISTED E&M SERVICE: CPT | Mod: S$PBB,,, | Performed by: OPHTHALMOLOGY

## 2019-03-07 PROCEDURE — 67028 PR INJECT INTRAVITREAL PHARMCOLOGIC: ICD-10-PCS | Mod: S$PBB,RT,, | Performed by: OPHTHALMOLOGY

## 2019-03-07 RX ORDER — POLYMYXIN B SULFATE AND TRIMETHOPRIM 1; 10000 MG/ML; [USP'U]/ML
1 SOLUTION OPHTHALMIC 4 TIMES DAILY
Qty: 10 ML | Refills: 0 | Status: SHIPPED | OUTPATIENT
Start: 2019-03-07 | End: 2019-03-11

## 2019-03-07 RX ADMIN — AFLIBERCEPT 2 MG: 40 INJECTION, SOLUTION INTRAVITREAL at 11:03

## 2019-03-07 NOTE — PROGRESS NOTES
===============================  03/07/2019   Mono Bergman,   73 y.o. male   Last visit [facility]: :2/7/2019   Last visit eye dept. 2/7/2019  [facility]:  Uncorrected distance visual acuity was 20/40 in the right eye and 20/200 in the left eye.   Not recorded         Not recorded         Not recorded        Chief Complaint   Patient presents with    Diabetes     pt here for eylea right eye. want to discuss with Dr. Tucker possibly starting treatment on the left eye. feel the right eye is stable. no ocular pain or irritation. vision is about the same.        HPI     Diabetes      Additional comments: pt here for eylea right eye. want to discuss with   Dr. Tucker possibly starting treatment on the left eye. feel the right eye   is stable. no ocular pain or irritation. vision is about the same.              Comments     1.) + DM SINCE 1997  2.) + PDR OU/ CSME OS  Bilateral PRP  PPV OS with Dr. 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 TEARS BID OS  REFRESH GEL BID OS   OMEGA E BID P.O.    PRP OD 10/2/18  EYLEA OS 9/13/17  EYLEA OD 2/7/19          Last edited by Stephon Newell on 3/7/2019 11:10 AM. (History)          ________________  3/7/2019  Problem List Items Addressed This Visit     None        od p[erisetn t600 m dme  Today only #2 of intended  motyjhly t x p  consdie os tx - does not want    .od dme   Need to d omnthly for a god trila    3/7/2019  Diagnosis :  od dme  Today:   Eylea (afibercept) 2 mg/0.05 ml Intravitreal Injection , OD   Follow up: rtc  1mo        Instructed to call 24/7 for any worsening of vision. Check Both eyes daily. Gave patient my home phone number.      Procedure  Note:   OD}  Eylea (afibercept) 2 mg/0.05 ml Intravitreal Injection    I have explained the Risks, Benefits and Alternatives of the procedure in detail.  The patient  voices understanding and all questions have been answered.  The patient agrees to proceed as discussed.  Xylocaine with Epi 2%  Sub conj bleb  was used for anesthesia.  Topical betadine was used for antisepsis.  0.05 cc was  injected 3.7 mm from corneal limbus in the inferotemporal quadrant.  Following injection the IOP was less than thirty (<30) by tonopen.  The eye was then thoroughly irrigated with BSS.  Patient tolerated procedure well.  No complications were observed.  The Patient was educated that mild irritation tonight was normal secondary to topical antispsis use.  Pt was advised to call at any time day or night for pain, redness, or any decline in vision. I gave the patient my home number as well as the clinic on call number. Daily visual checks and Amsler grid testing were reviewed.  polytrim Antibiotic Drops to be used 4 times daily for 4 days  VALERIANO Tucker MD  Procedure ordered: y  Consent: y  Pre auth: y  MAR:y  Opnote: y  Charge capture:y           ===========================

## 2019-04-09 ENCOUNTER — PROCEDURE VISIT (OUTPATIENT)
Dept: OPHTHALMOLOGY | Facility: CLINIC | Age: 74
End: 2019-04-09
Payer: MEDICARE

## 2019-04-09 DIAGNOSIS — Z79.4 TYPE 2 DIABETES MELLITUS WITH BOTH EYES AFFECTED BY PROLIFERATIVE RETINOPATHY AND MACULAR EDEMA, WITH LONG-TERM CURRENT USE OF INSULIN: Primary | ICD-10-CM

## 2019-04-09 DIAGNOSIS — E11.3513 TYPE 2 DIABETES MELLITUS WITH BOTH EYES AFFECTED BY PROLIFERATIVE RETINOPATHY AND MACULAR EDEMA, WITH LONG-TERM CURRENT USE OF INSULIN: Primary | ICD-10-CM

## 2019-04-09 PROCEDURE — 92134 POSTERIOR SEGMENT OCT RETINA (OCULAR COHERENCE TOMOGRAPHY)-BOTH EYES: ICD-10-PCS | Mod: 26,S$PBB,, | Performed by: OPHTHALMOLOGY

## 2019-04-09 PROCEDURE — 67028 INJECTION EYE DRUG: CPT | Mod: S$PBB,RT,, | Performed by: OPHTHALMOLOGY

## 2019-04-09 PROCEDURE — 99499 UNLISTED E&M SERVICE: CPT | Mod: S$PBB,,, | Performed by: OPHTHALMOLOGY

## 2019-04-09 PROCEDURE — 92134 CPTRZ OPH DX IMG PST SGM RTA: CPT | Mod: PBBFAC,PN | Performed by: OPHTHALMOLOGY

## 2019-04-09 PROCEDURE — 67028 PR INJECT INTRAVITREAL PHARMCOLOGIC: ICD-10-PCS | Mod: S$PBB,RT,, | Performed by: OPHTHALMOLOGY

## 2019-04-09 PROCEDURE — 67028 INJECTION EYE DRUG: CPT | Mod: PBBFAC,PN,RT | Performed by: OPHTHALMOLOGY

## 2019-04-09 PROCEDURE — 99499 NO LOS: ICD-10-PCS | Mod: S$PBB,,, | Performed by: OPHTHALMOLOGY

## 2019-04-09 RX ADMIN — AFLIBERCEPT 2 MG: 40 INJECTION, SOLUTION INTRAVITREAL at 12:04

## 2019-04-09 NOTE — PROGRESS NOTES
===============================  04/09/2019   Mono Bergman,   73 y.o. male   Last visit [facility]: :3/7/2019   Last visit eye dept. 3/7/2019  [facility]:  Corrected distance visual acuity was 20/50 in the right eye and 20/200 in the left eye.   Not recorded         Not recorded         Not recorded        Chief Complaint   Patient presents with    PDR/ME     EYLEA OD        HPI     PDR/ME      Additional comments: EYLEA OD              Comments     1.) + DM SINCE 1997  2.) + PDR OU/ CSME OS  Bilateral PRP  PPV OS with Dr. 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 TEARS BID OS  REFRESH GEL BID OS   OMEGA E BID P.O.    PRP OD 10/2/18  EYLEA OS 9/13/17  EYLEA OD 3/7/19          Last edited by Laya Carvalho on 4/9/2019 10:41 AM. (History)          ________________  4/9/2019  Problem List Items Addressed This Visit        Eye/Vision problems    Type 2 diabetes mellitus with both eyes affected by proliferative retinopathy and macular edema, with long-term current use of insulin - Primary    Relevant Medications    aflibercept Soln 2 mg (Completed)    Other Relevant Orders    Prior Authorization Order    Posterior Segment OCT Retina-Both eyes          od dme seruia; avgf  todau oct np0 better sl; worse od  Sp eyel x3 serial     So ... approx plateau od   rec eyela toda    Consider os hue jb; of moyly tx\    S/p eylea x 3 - minimal change maybe slight worse  rtc 1 m consider serial trial os  INB treat only if symptomatic   .  4/9/2019  Diagnosis :  od dm3  Today:   Eylea (afibercept) 2 mg/0.05 ml Intravitreal Injection , OD   Follow up: rtc 1 mo consder monthl;y trial os          Instructed to call 24/7 for any worsening of vision. Check Both eyes daily. Gave patient my home phone number.      Procedure  Note:   OD}  Eylea (afibercept) 2 mg/0.05 ml  Intravitreal Injection    I have explained the Risks, Benefits and Alternatives of the procedure in detail.  The patient voices understanding and all questions have been answered.  The patient agrees to proceed as discussed.  Xylocaine with Epi 2%  subconj bleb  was used for anesthesia.  Topical betadine was used for antisepsis.  0.05 cc was  injected 3.7 mm from corneal limbus in the inferotemporal quadrant.  Following injection the IOP was less than thirty (<30) by tonopen.  The eye was then thoroughly irrigated with BSS.  Patient tolerated procedure well.  No complications were observed.  The Patient was educated that mild irritation tonight was normal secondary to topical antispsis use.  Pt was advised to call at any time day or night for pain, redness, or any decline in vision. I gave the patient my home number as well as the clinic on call number. Daily visual checks and Amsler grid testing were reviewed.  polytrim Antibiotic Drops to be used 4 times daily for 4 days  VALERIANO Tucker MD  Procedure ordered: y  Consent: y  Pre auth: y  MAR:y  Opnote: y  Charge capture:y         ===========================

## 2019-04-15 ENCOUNTER — TELEPHONE (OUTPATIENT)
Dept: OPHTHALMOLOGY | Facility: CLINIC | Age: 74
End: 2019-04-15

## 2019-04-15 NOTE — TELEPHONE ENCOUNTER
I spoke with Jer Madalyn.  He complains of a black eye since his injection.  I attempted to explain to him this was ok, but patient had a difficult time understanding.  Dr. Tucker then spoke with him and let him know it was just a complication of the injection secondary to blood thinners he bruised worse.

## 2019-04-15 NOTE — TELEPHONE ENCOUNTER
The  called and attempted to transfer the pt. to me so i could get them in touch with 's techs but the phone went straight to a busy tone and was still busy when i attempted to call back

## 2019-04-15 NOTE — TELEPHONE ENCOUNTER
----- Message from Deanna Smith sent at 4/15/2019  9:41 AM CDT -----  Contact: pt  Would like to consult with nurse Camacho or Carolyn regarding personal issues,pt is schedule for proc  please call back at [phone].

## 2019-04-15 NOTE — TELEPHONE ENCOUNTER
----- Message from Ximena Mejia sent at 4/15/2019 10:55 AM CDT -----  Contact: [phone]  Would like to consult with the nurse, patient declined to leave message, please call back at [phone], thanks sj

## 2019-04-15 NOTE — TELEPHONE ENCOUNTER
I have called Mr. Barros back, but he doesn't answer.  I have left a message asking him to return my call and left my nurse station number.  I told him if he does not reach me to call or text Dr. Tucker and let us know what is going on with his eye.

## 2019-04-15 NOTE — TELEPHONE ENCOUNTER
----- Message from Deanna Smith sent at 4/15/2019 10:46 AM CDT -----  Contact: pt  Would like to consult with nurse Camacho or Carolyn regarding personal issues he been having with his vision since last week pt was offered appt but states he wanted to speak with staff , please call back at [phone].

## 2019-05-16 ENCOUNTER — PROCEDURE VISIT (OUTPATIENT)
Dept: OPHTHALMOLOGY | Facility: CLINIC | Age: 74
End: 2019-05-16
Payer: MEDICARE

## 2019-05-16 DIAGNOSIS — Z79.4 TYPE 2 DIABETES MELLITUS WITH BOTH EYES AFFECTED BY PROLIFERATIVE RETINOPATHY AND MACULAR EDEMA, WITH LONG-TERM CURRENT USE OF INSULIN: Primary | ICD-10-CM

## 2019-05-16 DIAGNOSIS — E11.3513 TYPE 2 DIABETES MELLITUS WITH BOTH EYES AFFECTED BY PROLIFERATIVE RETINOPATHY AND MACULAR EDEMA, WITH LONG-TERM CURRENT USE OF INSULIN: Primary | ICD-10-CM

## 2019-05-16 PROCEDURE — 67028 INJECTION EYE DRUG: CPT | Mod: S$PBB,RT,, | Performed by: OPHTHALMOLOGY

## 2019-05-16 PROCEDURE — 99499 UNLISTED E&M SERVICE: CPT | Mod: S$PBB,,, | Performed by: OPHTHALMOLOGY

## 2019-05-16 PROCEDURE — 92134 POSTERIOR SEGMENT OCT RETINA (OCULAR COHERENCE TOMOGRAPHY)-BOTH EYES: ICD-10-PCS | Mod: 26,S$PBB,, | Performed by: OPHTHALMOLOGY

## 2019-05-16 PROCEDURE — 92134 CPTRZ OPH DX IMG PST SGM RTA: CPT | Mod: PBBFAC | Performed by: OPHTHALMOLOGY

## 2019-05-16 PROCEDURE — 99499 NO LOS: ICD-10-PCS | Mod: S$PBB,,, | Performed by: OPHTHALMOLOGY

## 2019-05-16 PROCEDURE — 67028 PR INJECT INTRAVITREAL PHARMCOLOGIC: ICD-10-PCS | Mod: S$PBB,RT,, | Performed by: OPHTHALMOLOGY

## 2019-05-16 PROCEDURE — 67028 INJECTION EYE DRUG: CPT | Mod: PBBFAC,PN | Performed by: OPHTHALMOLOGY

## 2019-05-16 RX ADMIN — AFLIBERCEPT 2 MG: 40 INJECTION, SOLUTION INTRAVITREAL at 12:05

## 2019-05-16 NOTE — PROGRESS NOTES
===============================  05/16/2019   Mono Bergman,   73 y.o. male   Last visit [facility]: :4/9/2019   Last visit eye dept. 4/9/2019  [facility]:  Visual acuity was not recorded.   Not recorded         Not recorded         Not recorded        Chief Complaint   Patient presents with    Diabetes     pt here for eylea         HPI     Diabetes      Additional comments: pt here for eylea               Comments     1.) + DM SINCE 1997  2.) + PDR OU/ CSME OS  Bilateral PRP  PPV OS with Dr. 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 TEARS BID OS  REFRESH GEL BID OS   OMEGA E BID P.O.    PRP OD 10/2/18  EYLEA OS 9/13/17  EYLEA OD 3/7/19          Last edited by Stephon Newell on 5/16/2019 11:27 AM. (History)          ________________  5/16/2019  Problem List Items Addressed This Visit     None         today was to be tx if worse  But iod IS qorse so rec eyla od      5/16/2019  Diagnosis :  od dme  Today:   Eylea (afibercept) 2 mg/0.05 ml Intravitreal Injection , OD   Follow up: rtc 1 mo - do        Instructed to call 24/7 for any worsening of vision. Check Both eyes daily. Gave patient my home phone number.      Procedure  Note:   OD}  Eylea (afibercept) 2 mg/0.05 ml Intravitreal Injection    I have explained the Risks, Benefits and Alternatives of the procedure in detail.  The patient voices understanding and all questions have been answered.  The patient agrees to proceed as discussed.  Xylocaine with Epi 2%  Sub conj bleb  was used for anesthesia.  Topical betadine was used for antisepsis.  0.05 cc was  injected 3.7 mm from corneal limbus in the inferotemporal quadrant.  Following injection the IOP was less than thirty (<30) by tonopen.  The eye was then thoroughly irrigated with BSS.  Patient tolerated procedure well.  No complications were  observed.  The Patient was educated that mild irritation tonight was normal secondary to topical antispsis use.  Pt was advised to call at any time day or night for pain, redness, or any decline in vision. I gave the patient my home number as well as the clinic on call number. Daily visual checks and Amsler grid testing were reviewed.  polytrim Antibiotic Drops to be used 4 times daily for 4 days  VALERIANO Tucker MD  Procedure ordered: y  Consent: y  Pre auth: y  MAR:y  Opnote: y  Charge capture:y      .       ===========================

## 2019-06-20 ENCOUNTER — PROCEDURE VISIT (OUTPATIENT)
Dept: OPHTHALMOLOGY | Facility: CLINIC | Age: 74
End: 2019-06-20
Payer: MEDICARE

## 2019-06-20 DIAGNOSIS — E11.3513 TYPE 2 DIABETES MELLITUS WITH BOTH EYES AFFECTED BY PROLIFERATIVE RETINOPATHY AND MACULAR EDEMA, WITH LONG-TERM CURRENT USE OF INSULIN: Primary | ICD-10-CM

## 2019-06-20 DIAGNOSIS — Z79.4 TYPE 2 DIABETES MELLITUS WITH BOTH EYES AFFECTED BY PROLIFERATIVE RETINOPATHY AND MACULAR EDEMA, WITH LONG-TERM CURRENT USE OF INSULIN: Primary | ICD-10-CM

## 2019-06-20 PROCEDURE — 92134 POSTERIOR SEGMENT OCT RETINA (OCULAR COHERENCE TOMOGRAPHY)-BOTH EYES: ICD-10-PCS | Mod: 26,S$PBB,, | Performed by: OPHTHALMOLOGY

## 2019-06-20 PROCEDURE — 67028 PR INJECT INTRAVITREAL PHARMCOLOGIC: ICD-10-PCS | Mod: S$PBB,RT,, | Performed by: OPHTHALMOLOGY

## 2019-06-20 PROCEDURE — 67028 INJECTION EYE DRUG: CPT | Mod: PBBFAC,RT | Performed by: OPHTHALMOLOGY

## 2019-06-20 PROCEDURE — 67028 INJECTION EYE DRUG: CPT | Mod: S$PBB,RT,, | Performed by: OPHTHALMOLOGY

## 2019-06-20 PROCEDURE — 99499 UNLISTED E&M SERVICE: CPT | Mod: S$PBB,,, | Performed by: OPHTHALMOLOGY

## 2019-06-20 PROCEDURE — 99499 NO LOS: ICD-10-PCS | Mod: S$PBB,,, | Performed by: OPHTHALMOLOGY

## 2019-06-20 PROCEDURE — 92134 CPTRZ OPH DX IMG PST SGM RTA: CPT | Mod: PBBFAC | Performed by: OPHTHALMOLOGY

## 2019-06-20 RX ADMIN — AFLIBERCEPT 2 MG: 40 INJECTION, SOLUTION INTRAVITREAL at 11:06

## 2019-06-20 NOTE — PROGRESS NOTES
HPI     PDR/ME      Additional comments: EYLEA OD              Comments     1.) + DM SINCE 1997  2.) + PDR OU/ CSME OS  Bilateral PRP  PPV OS with Dr. 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 TEARS BID OS  REFRESH GEL BID OS   OMEGA E BID P.O.    PRP OD 10/2/18  EYLEA OS 9/13/17  EYLEA OD 5/16/19          Last edited by Laya Carvalho on 6/20/2019 10:23 AM. (History)            Assessment /Plan     For exam results, see Encounter Report.    Type 2 diabetes mellitus with both eyes affected by proliferative retinopathy and macular edema, with long-term current use of insulin  -     aflibercept Soln 2 mg  -     Prior Authorization Order  -     Posterior Segment OCT Retina-Both eyes      ***

## 2019-06-20 NOTE — PROGRESS NOTES
"    ===============================  06/20/2019   Mono Bergman,   73 y.o. male   Last visit [facility]: :5/16/2019   Last visit eye dept. 5/16/2019  [facility]:  Visual acuity was not recorded.   Not recorded         Not recorded         Not recorded        Chief Complaint   Patient presents with    PDR/ME     EYLEA OD        HPI     PDR/ME      Additional comments: EYLEA OD              Comments     1.) + DM SINCE 1997  2.) + PDR OU/ CSME OS  Bilateral PRP  PPV OS with Dr. 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 TEARS BID OS  REFRESH GEL BID OS   OMEGA E BID P.O.    PRP OD 10/2/18  EYLEA OS 9/13/17  EYLEA OD 5/16/19          Last edited by Laya Carvalho on 6/20/2019 10:23 AM. (History)          ________________  6/20/2019  Problem List Items Addressed This Visit        Eye/Vision problems    Type 2 diabetes mellitus with both eyes affected by proliferative retinopathy and macular edema, with long-term current use of insulin - Primary    Relevant Medications    aflibercept Soln 2 mg    Other Relevant Orders    Prior Authorization Order    Posterior Segment OCT Retina-Both eyes        Ongoing tx od  .. Was to stop and do trial os os   but last vsit od worse   today "new od #2"      6/20/2019  Diagnosis :  od dme  Today:   Eylea (afibercept) 2 mg/0.05 ml Intravitreal Injection , OD   Follow up: rtc 1  Mo when  plateaud inactiv od do tirla osof os          Instructed to call 24/7 for any worsening of vision. Check Both eyes daily. Gave patient my home phone number.      Procedure  Note:   OD}  Eylea (afibercept) 2 mg/0.05 ml Intravitreal Injection    I have explained the Risks, Benefits and Alternatives of the procedure in detail.  The patient voices understanding and all questions have been answered.  The patient agrees to proceed as discussed.  Xylocaine " with Epi 2% subconj bleb   was used for anesthesia.  Topical betadine was used for antisepsis.  0.05 cc was  injected 3.7 mm from corneal limbus in the inferotemporal quadrant.  Following injection the IOP was less than thirty (<30) by tonopen.  The eye was then thoroughly irrigated with BSS.  Patient tolerated procedure well.  No complications were observed.  The Patient was educated that mild irritation tonight was normal secondary to topical antispsis use.  Pt was advised to call at any time day or night for pain, redness, or any decline in vision. I gave the patient my home number as well as the clinic on call number. Daily visual checks and Amsler grid testing were reviewed.  ciloxan Antibiotic Drops to be used 4 times daily for 4 days  VALERIANO Tucker MD  Procedure ordered: y  Consent: y  Pre auth: y  MAR:y  Opnote: y  Charge capture:y      .       ===========================

## 2019-07-18 ENCOUNTER — PROCEDURE VISIT (OUTPATIENT)
Dept: OPHTHALMOLOGY | Facility: CLINIC | Age: 74
End: 2019-07-18
Payer: MEDICARE

## 2019-07-18 DIAGNOSIS — Z79.4 TYPE 2 DIABETES MELLITUS WITH BOTH EYES AFFECTED BY PROLIFERATIVE RETINOPATHY AND MACULAR EDEMA, WITH LONG-TERM CURRENT USE OF INSULIN: Primary | ICD-10-CM

## 2019-07-18 DIAGNOSIS — E11.3513 TYPE 2 DIABETES MELLITUS WITH BOTH EYES AFFECTED BY PROLIFERATIVE RETINOPATHY AND MACULAR EDEMA, WITH LONG-TERM CURRENT USE OF INSULIN: Primary | ICD-10-CM

## 2019-07-18 PROCEDURE — 99499 NO LOS: ICD-10-PCS | Mod: S$PBB,,, | Performed by: OPHTHALMOLOGY

## 2019-07-18 PROCEDURE — 92134 CPTRZ OPH DX IMG PST SGM RTA: CPT | Mod: PBBFAC | Performed by: OPHTHALMOLOGY

## 2019-07-18 PROCEDURE — 67028 INJECTION EYE DRUG: CPT | Mod: S$PBB,RT,, | Performed by: OPHTHALMOLOGY

## 2019-07-18 PROCEDURE — 67028 INJECTION EYE DRUG: CPT | Mod: PBBFAC,RT | Performed by: OPHTHALMOLOGY

## 2019-07-18 PROCEDURE — 67028 PR INJECT INTRAVITREAL PHARMCOLOGIC: ICD-10-PCS | Mod: S$PBB,RT,, | Performed by: OPHTHALMOLOGY

## 2019-07-18 PROCEDURE — 99499 UNLISTED E&M SERVICE: CPT | Mod: S$PBB,,, | Performed by: OPHTHALMOLOGY

## 2019-07-18 PROCEDURE — 92134 POSTERIOR SEGMENT OCT RETINA (OCULAR COHERENCE TOMOGRAPHY)-BOTH EYES: ICD-10-PCS | Mod: 26,S$PBB,, | Performed by: OPHTHALMOLOGY

## 2019-07-18 RX ORDER — POLYMYXIN B SULFATE AND TRIMETHOPRIM 1; 10000 MG/ML; [USP'U]/ML
SOLUTION OPHTHALMIC
COMMUNITY
Start: 2019-05-17 | End: 2019-10-03

## 2019-07-18 RX ORDER — POLYMYXIN B SULFATE AND TRIMETHOPRIM 1; 10000 MG/ML; [USP'U]/ML
1 SOLUTION OPHTHALMIC 4 TIMES DAILY
Qty: 1 BOTTLE | Refills: 0 | Status: SHIPPED | OUTPATIENT
Start: 2019-07-18 | End: 2019-07-22

## 2019-07-18 RX ADMIN — AFLIBERCEPT 2 MG: 40 INJECTION, SOLUTION INTRAVITREAL at 02:07

## 2019-07-18 NOTE — PROGRESS NOTES
===============================  Mono Bergman,   73 y.o. male   Last visit [facility]: :6/20/2019   Last visit eye dept. 6/20/2019  [facility]:  Uncorrected distance visual acuity was 20/70 in the right eye and 20/200 in the left eye.   Not recorded         Not recorded         Not recorded         Not recorded        Chief Complaint   Patient presents with    PDR/ME     EYLEA OD          ________________  7/18/2019  HPI     PDR/ME      Additional comments: EYLEA OD              Comments     1.) + DM SINCE 1997  2.) + PDR OU/ CSME OS  Bilateral PRP  PPV OS with Dr. 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 TEARS BID OS  REFRESH GEL BID OS   OMEGA E BID P.O.    PRP OD 10/2/18  EYLEA OS 9/13/17  EYLEA OD 6/20/19          Last edited by Laya Carvalho on 7/18/2019  1:21 PM. (History)      Problem List Items Addressed This Visit        Eye/Vision problems    Type 2 diabetes mellitus with both eyes affected by proliferative retinopathy and macular edema, with long-term current use of insulin - Primary    Relevant Medications    polymyxin B sulf-trimethoprim (POLYTRIM) 10,000 unit- 1 mg/mL Drop    aflibercept Soln 2 mg    Other Relevant Orders    Prior Authorization Order    Posterior Segment OCT Retina-Both eyes (Completed)        20 70  tx to p od  Then cosnider structrued trial of os    Oct better od    7/18/2019  Diagnosis :  od srn  Today:   Eylea (afibercept) 2 mg/0.05 ml Intravitreal Injection , OD   Follow up: rtc 1 mo         Instructed to call 24/7 for any worsening of vision. Check Both eyes daily. Gave patient my home phone number.      Procedure  Note:   OD}  Eylea (afibercept) 2 mg/0.05 ml Intravitreal Injection    I have explained the Risks, Benefits and Alternatives of the procedure in detail.  The patient voices understanding and all questions  have been answered.  The patient agrees to proceed as discussed.  Xylocaine with Epi 2%  subconj bleb  was used for anesthesia.  Topical betadine was used for antisepsis.  0.05 cc was  injected 3.7 mm from corneal limbus in the inferotemporal quadrant.  Following injection the IOP was less than thirty (<30) by tonopen.  The eye was then thoroughly irrigated with BSS.  Patient tolerated procedure well.  No complications were observed.  The Patient was educated that mild irritation tonight was normal secondary to topical antispsis use.  Pt was advised to call at any time day or night for pain, redness, or any decline in vision. I gave the patient my home number as well as the clinic on call number. Daily visual checks and Amsler grid testing were reviewed.  ciloxan Antibiotic Drops to be used 4 times daily for 4 days  VALERIANO Tucker MD  Procedure ordered: y  Consent: y  Pre auth: y  MAR:y  Opnote: y  Charge capture:y      .       ===========================

## 2019-08-15 ENCOUNTER — PROCEDURE VISIT (OUTPATIENT)
Dept: OPHTHALMOLOGY | Facility: CLINIC | Age: 74
End: 2019-08-15
Payer: MEDICARE

## 2019-08-15 DIAGNOSIS — Z79.4 TYPE 2 DIABETES MELLITUS WITH BOTH EYES AFFECTED BY PROLIFERATIVE RETINOPATHY AND MACULAR EDEMA, WITH LONG-TERM CURRENT USE OF INSULIN: Primary | ICD-10-CM

## 2019-08-15 DIAGNOSIS — E11.3513 TYPE 2 DIABETES MELLITUS WITH BOTH EYES AFFECTED BY PROLIFERATIVE RETINOPATHY AND MACULAR EDEMA, WITH LONG-TERM CURRENT USE OF INSULIN: Primary | ICD-10-CM

## 2019-08-15 PROCEDURE — 67028 INJECTION EYE DRUG: CPT | Mod: S$PBB,RT,, | Performed by: OPHTHALMOLOGY

## 2019-08-15 PROCEDURE — 92134 POSTERIOR SEGMENT OCT RETINA (OCULAR COHERENCE TOMOGRAPHY)-BOTH EYES: ICD-10-PCS | Mod: 26,S$PBB,, | Performed by: OPHTHALMOLOGY

## 2019-08-15 PROCEDURE — 67028 PR INJECT INTRAVITREAL PHARMCOLOGIC: ICD-10-PCS | Mod: S$PBB,RT,, | Performed by: OPHTHALMOLOGY

## 2019-08-15 PROCEDURE — 99499 NO LOS: ICD-10-PCS | Mod: S$PBB,,, | Performed by: OPHTHALMOLOGY

## 2019-08-15 PROCEDURE — 92134 CPTRZ OPH DX IMG PST SGM RTA: CPT | Mod: PBBFAC | Performed by: OPHTHALMOLOGY

## 2019-08-15 PROCEDURE — 67028 INJECTION EYE DRUG: CPT | Mod: PBBFAC,RT | Performed by: OPHTHALMOLOGY

## 2019-08-15 PROCEDURE — 99499 UNLISTED E&M SERVICE: CPT | Mod: S$PBB,,, | Performed by: OPHTHALMOLOGY

## 2019-08-15 RX ORDER — BISACODYL 5 MG
TABLET, DELAYED RELEASE (ENTERIC COATED) ORAL
COMMUNITY
End: 2019-10-03

## 2019-08-15 RX ORDER — POLYETHYLENE GLYCOL 3350, SODIUM SULFATE, SODIUM CHLORIDE, POTASSIUM CHLORIDE, SODIUM ASCORBATE, AND ASCORBIC ACID 7.5-2.691G
KIT ORAL
COMMUNITY
End: 2019-10-03

## 2019-08-15 RX ORDER — POLYETHYLENE GLYCOL 3350 17 G/17G
POWDER, FOR SOLUTION ORAL
COMMUNITY
Start: 2019-08-08 | End: 2023-08-31

## 2019-08-15 RX ORDER — CALCIUM CARBONATE 500(1250)
TABLET,CHEWABLE ORAL
COMMUNITY
End: 2019-10-03

## 2019-08-15 RX ORDER — SIMETHICONE 80 MG
TABLET,CHEWABLE ORAL
COMMUNITY
End: 2023-08-31

## 2019-08-15 RX ORDER — CHOLECALCIFEROL (VITAMIN D3) 125 MCG
CAPSULE ORAL
COMMUNITY
End: 2019-10-03

## 2019-08-15 RX ADMIN — AFLIBERCEPT 2 MG: 40 INJECTION, SOLUTION INTRAVITREAL at 11:08

## 2019-08-15 NOTE — PROGRESS NOTES
===============================  Mono Bergman,   73 y.o. male   Last visit [facility]: :7/18/2019   Last visit eye dept. 7/18/2019  [facility]:  Uncorrected distance visual acuity was 20/80 in the right eye and 20/200 in the left eye.   Not recorded         Not recorded         Not recorded         Not recorded        Chief Complaint   Patient presents with    PDR/ME     EYLEA OD          ________________  8/15/2019  HPI     PDR/ME      Additional comments: EYLEA OD              Comments     1.) + DM SINCE 1997  2.) + PDR OU/ CSME OS  Bilateral PRP  PPV OS with Dr. 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 TEARS BID OS  REFRESH GEL BID OS   OMEGA E BID P.O.    PRP OD 10/2/18  EYLEA OS 9/13/17  EYLEA OD 7/18/19          Last edited by Laya Carvalho on 8/15/2019  9:55 AM. (History)      Problem List Items Addressed This Visit        Eye/Vision problems    Type 2 diabetes mellitus with both eyes affected by proliferative retinopathy and macular edema, with long-term current use of insulin - Primary    Relevant Medications    aflibercept Soln 2 mg (Completed)    Other Relevant Orders    Prior Authorization Order    Posterior Segment OCT Retina-Both eyes        od sl better again today    .  8/15/2019  Diagnosis :  dme od  Today:   Eylea (afibercept) 2 mg/0.05 ml Intravitreal Injection , OD   Follow up: 1 mo        Instructed to call 24/7 for any worsening of vision. Check Both eyes daily. Gave patient my home phone number.      Procedure  Note:   OD}  Eylea (afibercept) 2 mg/0.05 ml Intravitreal Injection    I have explained the Risks, Benefits and Alternatives of the procedure in detail.  The patient voices understanding and all questions have been answered.  The patient agrees to proceed as discussed.  Xylocaine with Epi 2%  subconj bleb  was used for  anesthesia.  Topical betadine was used for antisepsis.  0.05 cc was  injected 3.7 mm from corneal limbus in the inferotemporal quadrant.  Following injection the IOP was less than thirty (<30) by tonopen.  The eye was then thoroughly irrigated with BSS.  Patient tolerated procedure well.  No complications were observed.  The Patient was educated that mild irritation tonight was normal secondary to topical antispsis use.  Pt was advised to call at any time day or night for pain, redness, or any decline in vision. I gave the patient my home number as well as the clinic on call number. Daily visual checks and Amsler grid testing were reviewed.  ciloxan Antibiotic Drops to be used 4 times daily for 4 days  VALERIANO Tucker MD  Procedure ordered: y  Consent: y  Pre auth: y  MAR:y  Opnote: y  Charge capture:y           ===========================

## 2019-09-12 ENCOUNTER — OFFICE VISIT (OUTPATIENT)
Dept: INTERNAL MEDICINE | Facility: CLINIC | Age: 74
End: 2019-09-12
Payer: MEDICARE

## 2019-09-12 VITALS
DIASTOLIC BLOOD PRESSURE: 58 MMHG | WEIGHT: 205.25 LBS | HEART RATE: 74 BPM | HEIGHT: 68 IN | TEMPERATURE: 98 F | BODY MASS INDEX: 31.11 KG/M2 | SYSTOLIC BLOOD PRESSURE: 84 MMHG

## 2019-09-12 DIAGNOSIS — E11.69 HYPERLIPIDEMIA ASSOCIATED WITH TYPE 2 DIABETES MELLITUS: ICD-10-CM

## 2019-09-12 DIAGNOSIS — E11.3513 TYPE 2 DIABETES MELLITUS WITH BOTH EYES AFFECTED BY PROLIFERATIVE RETINOPATHY AND MACULAR EDEMA, WITH LONG-TERM CURRENT USE OF INSULIN: Primary | ICD-10-CM

## 2019-09-12 DIAGNOSIS — Z79.4 TYPE 2 DIABETES MELLITUS WITH BOTH EYES AFFECTED BY PROLIFERATIVE RETINOPATHY AND MACULAR EDEMA, WITH LONG-TERM CURRENT USE OF INSULIN: Primary | ICD-10-CM

## 2019-09-12 DIAGNOSIS — I15.2 HYPERTENSION ASSOCIATED WITH DIABETES: ICD-10-CM

## 2019-09-12 DIAGNOSIS — E11.59 HYPERTENSION ASSOCIATED WITH DIABETES: ICD-10-CM

## 2019-09-12 DIAGNOSIS — E78.5 HYPERLIPIDEMIA ASSOCIATED WITH TYPE 2 DIABETES MELLITUS: ICD-10-CM

## 2019-09-12 PROCEDURE — 99999 PR PBB SHADOW E&M-EST. PATIENT-LVL III: CPT | Mod: PBBFAC,,, | Performed by: INTERNAL MEDICINE

## 2019-09-12 PROCEDURE — 99213 OFFICE O/P EST LOW 20 MIN: CPT | Mod: PBBFAC,PO | Performed by: INTERNAL MEDICINE

## 2019-09-12 PROCEDURE — 99999 PR PBB SHADOW E&M-EST. PATIENT-LVL III: ICD-10-PCS | Mod: PBBFAC,,, | Performed by: INTERNAL MEDICINE

## 2019-09-12 PROCEDURE — 99499 UNLISTED E&M SERVICE: CPT | Mod: S$PBB,,, | Performed by: INTERNAL MEDICINE

## 2019-09-12 PROCEDURE — 99499 NO LOS: ICD-10-PCS | Mod: S$PBB,,, | Performed by: INTERNAL MEDICINE

## 2019-09-12 NOTE — PROGRESS NOTES
"Subjective:       Patient ID: Mono Bergman is a 74 y.o. male.    Chief Complaint: Establish Care (want civilian PCP along with his [facility] Dr[facility] want Rx)    HPI  Patient is a 74-year-old male presents today as a new patient.  Patient was requesting orders for a 14 day blood sugar monitor.  [facility] with [facility] but he was under the impression his primary care doctor at the [facility] could not order this test.  I discussed with him that having to primary care dove providers is problematic as trying to manage diseases such as diabetes high blood pressure cholesterol with 2 people making decisions that are not coordinated would put him at risk for medical air.  I explained to him that I would be happy to take him on as a patient but we would need to define that I would be his primary care in the [facility] would not.  He expressed that he wanted to get his medications through the [facility] because there are free through that and I expressed understanding of that.  Certainly have had patients the past that have done that.  He did not wish to give up his primary care provider at the [facility] however.  Discussed for quite some time the complexities this would add to care to have 2 different doctors managing his healthcare and the opportunities that would present for Jagjit to occur due to lack of communication and for his care to suffer and for him to be potentially harm does result.  I  recommended the patient that he would really need to decide if he wanted to utilize the [facility] for his primary care or outside physicians for his primary care.  At the end of the discussion the patient decided he would stick with the [facility].    Review of Systems    Objective:   BP (!) 84/58 (BP Location: Left arm, Patient Position: Sitting, BP Method: Large (Manual))   Pulse 74   Temp 98.4 °F (36.9 °C) (Oral)   Ht 5' 8" (1.727 m)   Wt 93.1 kg (205 lb 4 oz)   BMI 31.21 kg/m²      Physical Exam    No visits with results " within 2 Week(s) from this visit.   Latest known visit with results is:   Admission on 05/01/2017, Discharged on 05/02/2017   Component Date Value    WBC 05/01/2017 17.26*    RBC 05/01/2017 4.98     Hemoglobin 05/01/2017 15.1     Hematocrit 05/01/2017 42.1     Mean Corpuscular Volume 05/01/2017 85     Mean Corpuscular Hemoglo* 05/01/2017 30.3     Mean Corpuscular Hemoglo* 05/01/2017 35.9     RDW 05/01/2017 12.7     Platelets 05/01/2017 234     MPV 05/01/2017 9.7     Gran # (ANC) 05/01/2017 14.6*    Lymph # 05/01/2017 1.6     Mono # 05/01/2017 1.0     Eos # 05/01/2017 0.0     Baso # 05/01/2017 0.02     Gran% 05/01/2017 84.8*    Lymph% 05/01/2017 9.2*    Mono% 05/01/2017 6.0     Eosinophil% 05/01/2017 0.1     Basophil% 05/01/2017 0.1     Differential Method 05/01/2017 Automated     Sodium 05/01/2017 133*    Potassium 05/01/2017 5.1     Chloride 05/01/2017 97     CO2 05/01/2017 25     Glucose 05/01/2017 273*    BUN, Bld 05/01/2017 32*    Creatinine 05/01/2017 1.9*    Calcium 05/01/2017 8.8     Total Protein 05/01/2017 7.7     Albumin 05/01/2017 3.6     Total Bilirubin 05/01/2017 1.2*    Alkaline Phosphatase 05/01/2017 170*    AST 05/01/2017 20     ALT 05/01/2017 24     Anion Gap 05/01/2017 11     eGFR if  05/01/2017 41*    eGFR if non  Amer* 05/01/2017 35*    Lactate (Lactic Acid) 05/01/2017 1.3     Amylase 05/01/2017 57     Lipase 05/01/2017 8     Prothrombin Time 05/01/2017 10.7     INR 05/01/2017 1.0     Sodium 05/01/2017 133*    Potassium 05/01/2017 4.3     Chloride 05/01/2017 99     CO2 05/01/2017 24     Glucose 05/01/2017 284*    BUN, Bld 05/01/2017 34*    Creatinine 05/01/2017 1.7*    Calcium 05/01/2017 8.0*    Total Protein 05/01/2017 6.6     Albumin 05/01/2017 3.1*    Total Bilirubin 05/01/2017 1.1*    Alkaline Phosphatase 05/01/2017 145*    AST 05/01/2017 19     ALT 05/01/2017 21     Anion Gap 05/01/2017 10     eGFR if   American 05/01/2017 47*    eGFR if non  Amer* 05/01/2017 40*    Sodium 05/01/2017 134*    Potassium 05/01/2017 4.6     Chloride 05/01/2017 100     CO2 05/01/2017 23     Glucose 05/01/2017 276*    BUN, Bld 05/01/2017 33*    Creatinine 05/01/2017 1.6*    Calcium 05/01/2017 7.9*    Total Protein 05/01/2017 6.8     Albumin 05/01/2017 3.2*    Total Bilirubin 05/01/2017 1.1*    Alkaline Phosphatase 05/01/2017 150*    AST 05/01/2017 18     ALT 05/01/2017 19     Anion Gap 05/01/2017 11     eGFR if  05/01/2017 50*    eGFR if non  Amer* 05/01/2017 43*    POCT Glucose 05/01/2017 290*       Assessment:       1. Type 2 diabetes mellitus with both eyes affected by proliferative retinopathy and macular edema, with long-term current use of insulin    2. Hypertension associated with diabetes    3. Hyperlipidemia associated with type 2 diabetes mellitus        Plan:   No problem-specific Assessment & Plan notes found for this encounter.    Type 2 diabetes mellitus with both eyes affected by proliferative retinopathy and macular edema, with long-term current use of insulin    Hypertension associated with diabetes    Hyperlipidemia associated with type 2 diabetes mellitus     As noted above this and tired visit was in discussion about the best approach to managing his care.  He was under the impression that he could have 2 primary care doctors working somewhat independently of each other and and I have strain him that was a bad idea.  After this discussion he decided he did not want to change out [facility] the [facility] so [facility] is going [facility] with [facility].

## 2019-10-03 ENCOUNTER — LAB VISIT (OUTPATIENT)
Dept: LAB | Facility: HOSPITAL | Age: 74
End: 2019-10-03
Attending: FAMILY MEDICINE
Payer: MEDICARE

## 2019-10-03 ENCOUNTER — OFFICE VISIT (OUTPATIENT)
Dept: INTERNAL MEDICINE | Facility: CLINIC | Age: 74
End: 2019-10-03
Payer: MEDICARE

## 2019-10-03 VITALS
SYSTOLIC BLOOD PRESSURE: 88 MMHG | TEMPERATURE: 98 F | HEART RATE: 60 BPM | HEIGHT: 68 IN | DIASTOLIC BLOOD PRESSURE: 54 MMHG | BODY MASS INDEX: 31.74 KG/M2 | WEIGHT: 209.44 LBS

## 2019-10-03 DIAGNOSIS — E11.59 HYPERTENSION ASSOCIATED WITH DIABETES: ICD-10-CM

## 2019-10-03 DIAGNOSIS — E11.3513 TYPE 2 DIABETES MELLITUS WITH BOTH EYES AFFECTED BY PROLIFERATIVE RETINOPATHY AND MACULAR EDEMA, WITH LONG-TERM CURRENT USE OF INSULIN: Primary | ICD-10-CM

## 2019-10-03 DIAGNOSIS — E11.69 HYPERLIPIDEMIA ASSOCIATED WITH TYPE 2 DIABETES MELLITUS: ICD-10-CM

## 2019-10-03 DIAGNOSIS — Z79.4 TYPE 2 DIABETES MELLITUS WITH BOTH EYES AFFECTED BY PROLIFERATIVE RETINOPATHY AND MACULAR EDEMA, WITH LONG-TERM CURRENT USE OF INSULIN: Primary | ICD-10-CM

## 2019-10-03 DIAGNOSIS — Z12.5 PROSTATE CANCER SCREENING: ICD-10-CM

## 2019-10-03 DIAGNOSIS — Z11.59 NEED FOR HEPATITIS C SCREENING TEST: ICD-10-CM

## 2019-10-03 DIAGNOSIS — E78.5 HYPERLIPIDEMIA ASSOCIATED WITH TYPE 2 DIABETES MELLITUS: ICD-10-CM

## 2019-10-03 DIAGNOSIS — I15.2 HYPERTENSION ASSOCIATED WITH DIABETES: ICD-10-CM

## 2019-10-03 PROCEDURE — 99213 OFFICE O/P EST LOW 20 MIN: CPT | Mod: PBBFAC,PO | Performed by: FAMILY MEDICINE

## 2019-10-03 PROCEDURE — 99214 OFFICE O/P EST MOD 30 MIN: CPT | Mod: S$PBB,,, | Performed by: FAMILY MEDICINE

## 2019-10-03 PROCEDURE — G0009 ADMIN PNEUMOCOCCAL VACCINE: HCPCS | Mod: PBBFAC,PO

## 2019-10-03 PROCEDURE — 36415 COLL VENOUS BLD VENIPUNCTURE: CPT | Mod: PO

## 2019-10-03 PROCEDURE — 90662 IIV NO PRSV INCREASED AG IM: CPT | Mod: PBBFAC,PO

## 2019-10-03 PROCEDURE — 99999 PR PBB SHADOW E&M-EST. PATIENT-LVL III: ICD-10-PCS | Mod: PBBFAC,,, | Performed by: FAMILY MEDICINE

## 2019-10-03 PROCEDURE — 99999 PR PBB SHADOW E&M-EST. PATIENT-LVL III: CPT | Mod: PBBFAC,,, | Performed by: FAMILY MEDICINE

## 2019-10-03 PROCEDURE — 84153 ASSAY OF PSA TOTAL: CPT

## 2019-10-03 PROCEDURE — 99214 PR OFFICE/OUTPT VISIT, EST, LEVL IV, 30-39 MIN: ICD-10-PCS | Mod: S$PBB,,, | Performed by: FAMILY MEDICINE

## 2019-10-03 PROCEDURE — 86803 HEPATITIS C AB TEST: CPT

## 2019-10-03 RX ORDER — IBUPROFEN 200 MG
400 TABLET ORAL DAILY PRN
COMMUNITY
End: 2023-08-31 | Stop reason: ALTCHOICE

## 2019-10-03 RX ORDER — DOCUSATE SODIUM 100 MG/1
100 CAPSULE, LIQUID FILLED ORAL 2 TIMES DAILY
COMMUNITY

## 2019-10-03 RX ORDER — LANOLIN ALCOHOL/MO/W.PET/CERES
100 CREAM (GRAM) TOPICAL DAILY
COMMUNITY

## 2019-10-03 RX ORDER — METOCLOPRAMIDE 10 MG/1
10 TABLET ORAL DAILY
COMMUNITY

## 2019-10-03 NOTE — PROGRESS NOTES
"Subjective:      Patient ID: Mono Bergman is a 74 y.o. male.    Chief Complaint:  DM/hypotension/post polio syndrome    HPI  73 yo male here to establish care.  He mainly wants to have a civilian doctor to help see specialists quicker and see about doing the Annetta BG monitoring.  He is not taking a number of meds that are on his list.  He has hypotension and has bene taking propranolol for this instead of midodrine by mistake.   Today he has drank maybe 12 oz of fluids only.  He has brace on legs and falls easily as he gets dizzy.  Had a fall this AM, no injury.  He is legally blind/deaf per the .   He has a caretaker that helps him, she is here with him today.    Past Medical History:   Diagnosis Date    Arthritis     Cataract     Colon polyps 10/29/13    Diabetes mellitus type II     Diabetic retinopathy     GERD (gastroesophageal reflux disease) 7/18/2013    Hyperlipidemia LDL goal < 70 7/18/2013    Hypertension     Low BP    Uveitis 5/11/2016     Family History   Problem Relation Age of Onset    Heart disease Mother     Colon cancer Neg Hx      Past Surgical History:   Procedure Laterality Date    CATARACT EXTRACTION W/  INTRAOCULAR LENS IMPLANT Left 12/10/14    CATARACT EXTRACTION W/  INTRAOCULAR LENS IMPLANT Right 07/13/2016    CHOLECYSTECTOMY      COLONOSCOPY N/A 2/3/2017    Procedure: COLONOSCOPY;  Surgeon: Natan Magdaleno MD;  Location: [facility];  Service: Endoscopy;  Laterality: N/A;    EYE SURGERY      KNEE SURGERY  1971    left knee    rk rt. eye       Social History     Tobacco Use    Smoking status: Never Smoker    Smokeless tobacco: Never Used   Substance Use Topics    Alcohol use: No    Drug use: No       BP (!) 88/54 (BP Location: Left arm, Patient Position: Sitting, BP Method: Large (Manual))   Pulse 60   Temp 98 °F (36.7 °C) (Oral)   Ht 5' 8" (1.727 m)   Wt 95 kg (209 lb 7 oz)   BMI 31.84 kg/m²     Review of Systems   Constitutional: Positive for " activity change.   Respiratory: Negative for chest tightness, shortness of breath and wheezing.    Cardiovascular: Negative for chest pain and palpitations.   Neurological: Positive for dizziness and weakness.       Objective:     Physical Exam   Constitutional: He appears well-developed.   HENT:   Mouth/Throat: Oropharynx is clear and moist.   Cardiovascular: Normal rate, regular rhythm and normal heart sounds.   Pulmonary/Chest: Effort normal and breath sounds normal. No respiratory distress.   Abdominal: Soft. Bowel sounds are normal.   Musculoskeletal: He exhibits no edema.   Psychiatric: He has a normal mood and affect. His behavior is normal. Judgment and thought content normal.   Nursing note and vitals reviewed.      Lab Results   Component Value Date    WBC 17.26 (H) 05/01/2017    HGB 15.1 05/01/2017    HCT 42.1 05/01/2017     05/01/2017    ALT 19 05/01/2017    AST 18 05/01/2017     (L) 05/01/2017    K 4.6 05/01/2017     05/01/2017    CREATININE 1.6 (H) 05/01/2017    BUN 33 (H) 05/01/2017    CO2 23 05/01/2017    INR 1.0 05/01/2017       Assessment:     1. Type 2 diabetes mellitus with both eyes affected by proliferative retinopathy and macular edema, with long-term current use of insulin    2. Hypertension associated with diabetes    3. Hyperlipidemia associated with type 2 diabetes mellitus    4. Need for hepatitis C screening test    5. Prostate cancer screening         Plan:     Type 2 diabetes mellitus with both eyes affected by proliferative retinopathy and macular edema, with long-term current use of insulin  -     Hemoglobin A1c; Future; Expected date: 10/08/2019    Hypertension associated with diabetes  -     CBC auto differential; Future; Expected date: 10/08/2019    Hyperlipidemia associated with type 2 diabetes mellitus  -     Comprehensive metabolic panel; Future; Expected date: 10/08/2019  -     Lipid panel; Future; Expected date: 10/08/2019    Need for hepatitis C screening  test  -     Hepatitis C antibody; Future; Expected date: 10/03/2019    Prostate cancer screening  -     PSA, Screening; Future; Expected date: 10/03/2019    Other orders  -     (In Office Administered) Pneumococcal Conjugate Vaccine (13 Valent) (IM)    Will update labs and see what BG look like and then try to get Annetta system approved  BP on low side.  Pt taking meds wrong,   Increase water intake.    Pt had a fall out in the lobby.  Got dizzy/weak and fell into wall and slid to the floor.  Did not hurt himself.  He has hypotension.  Per pt he is not taking his midodrine.  He was mistakenly taking propranolol thinking I would increase his BP.    He was given a coke by the nurse who thought his BG was low.  REading in clinic 206.  F/u to be determined

## 2019-10-04 LAB
COMPLEXED PSA SERPL-MCNC: 2.5 NG/ML (ref 0–4)
HCV AB SERPL QL IA: NEGATIVE

## 2019-10-08 ENCOUNTER — LAB VISIT (OUTPATIENT)
Dept: LAB | Facility: HOSPITAL | Age: 74
End: 2019-10-08
Attending: FAMILY MEDICINE
Payer: MEDICARE

## 2019-10-08 DIAGNOSIS — Z79.4 TYPE 2 DIABETES MELLITUS WITH BOTH EYES AFFECTED BY PROLIFERATIVE RETINOPATHY AND MACULAR EDEMA, WITH LONG-TERM CURRENT USE OF INSULIN: ICD-10-CM

## 2019-10-08 DIAGNOSIS — E78.5 HYPERLIPIDEMIA ASSOCIATED WITH TYPE 2 DIABETES MELLITUS: ICD-10-CM

## 2019-10-08 DIAGNOSIS — E11.59 HYPERTENSION ASSOCIATED WITH DIABETES: ICD-10-CM

## 2019-10-08 DIAGNOSIS — E11.69 HYPERLIPIDEMIA ASSOCIATED WITH TYPE 2 DIABETES MELLITUS: ICD-10-CM

## 2019-10-08 DIAGNOSIS — E11.3513 TYPE 2 DIABETES MELLITUS WITH BOTH EYES AFFECTED BY PROLIFERATIVE RETINOPATHY AND MACULAR EDEMA, WITH LONG-TERM CURRENT USE OF INSULIN: ICD-10-CM

## 2019-10-08 DIAGNOSIS — I15.2 HYPERTENSION ASSOCIATED WITH DIABETES: ICD-10-CM

## 2019-10-08 LAB
ALBUMIN SERPL BCP-MCNC: 3.6 G/DL (ref 3.5–5.2)
ALP SERPL-CCNC: 192 U/L (ref 55–135)
ALT SERPL W/O P-5'-P-CCNC: 37 U/L (ref 10–44)
ANION GAP SERPL CALC-SCNC: 9 MMOL/L (ref 8–16)
AST SERPL-CCNC: 28 U/L (ref 10–40)
BASOPHILS # BLD AUTO: 0.03 K/UL (ref 0–0.2)
BASOPHILS NFR BLD: 0.4 % (ref 0–1.9)
BILIRUB SERPL-MCNC: 0.4 MG/DL (ref 0.1–1)
BUN SERPL-MCNC: 23 MG/DL (ref 8–23)
CALCIUM SERPL-MCNC: 10.2 MG/DL (ref 8.7–10.5)
CHLORIDE SERPL-SCNC: 103 MMOL/L (ref 95–110)
CHOLEST SERPL-MCNC: 133 MG/DL (ref 120–199)
CHOLEST/HDLC SERPL: 4.4 {RATIO} (ref 2–5)
CO2 SERPL-SCNC: 28 MMOL/L (ref 23–29)
CREAT SERPL-MCNC: 1.3 MG/DL (ref 0.5–1.4)
DIFFERENTIAL METHOD: ABNORMAL
EOSINOPHIL # BLD AUTO: 0.1 K/UL (ref 0–0.5)
EOSINOPHIL NFR BLD: 1.1 % (ref 0–8)
ERYTHROCYTE [DISTWIDTH] IN BLOOD BY AUTOMATED COUNT: 12.5 % (ref 11.5–14.5)
EST. GFR  (AFRICAN AMERICAN): >60 ML/MIN/1.73 M^2
EST. GFR  (NON AFRICAN AMERICAN): 54.5 ML/MIN/1.73 M^2
ESTIMATED AVG GLUCOSE: 197 MG/DL (ref 68–131)
GLUCOSE SERPL-MCNC: 68 MG/DL (ref 70–110)
HBA1C MFR BLD HPLC: 8.5 % (ref 4–5.6)
HCT VFR BLD AUTO: 41.5 % (ref 40–54)
HDLC SERPL-MCNC: 30 MG/DL (ref 40–75)
HDLC SERPL: 22.6 % (ref 20–50)
HGB BLD-MCNC: 13.3 G/DL (ref 14–18)
IMM GRANULOCYTES # BLD AUTO: 0.02 K/UL (ref 0–0.04)
IMM GRANULOCYTES NFR BLD AUTO: 0.3 % (ref 0–0.5)
LDLC SERPL CALC-MCNC: 83 MG/DL (ref 63–159)
LYMPHOCYTES # BLD AUTO: 1.9 K/UL (ref 1–4.8)
LYMPHOCYTES NFR BLD: 26.2 % (ref 18–48)
MCH RBC QN AUTO: 31.1 PG (ref 27–31)
MCHC RBC AUTO-ENTMCNC: 32 G/DL (ref 32–36)
MCV RBC AUTO: 97 FL (ref 82–98)
MONOCYTES # BLD AUTO: 0.7 K/UL (ref 0.3–1)
MONOCYTES NFR BLD: 8.9 % (ref 4–15)
NEUTROPHILS # BLD AUTO: 4.6 K/UL (ref 1.8–7.7)
NEUTROPHILS NFR BLD: 63.1 % (ref 38–73)
NONHDLC SERPL-MCNC: 103 MG/DL
NRBC BLD-RTO: 0 /100 WBC
PLATELET # BLD AUTO: 235 K/UL (ref 150–350)
PMV BLD AUTO: 11.1 FL (ref 9.2–12.9)
POTASSIUM SERPL-SCNC: 4.3 MMOL/L (ref 3.5–5.1)
PROT SERPL-MCNC: 7.2 G/DL (ref 6–8.4)
RBC # BLD AUTO: 4.27 M/UL (ref 4.6–6.2)
SODIUM SERPL-SCNC: 140 MMOL/L (ref 136–145)
TRIGL SERPL-MCNC: 100 MG/DL (ref 30–150)
WBC # BLD AUTO: 7.33 K/UL (ref 3.9–12.7)

## 2019-10-08 PROCEDURE — 83036 HEMOGLOBIN GLYCOSYLATED A1C: CPT

## 2019-10-08 PROCEDURE — 85025 COMPLETE CBC W/AUTO DIFF WBC: CPT

## 2019-10-08 PROCEDURE — 36415 COLL VENOUS BLD VENIPUNCTURE: CPT | Mod: PO

## 2019-10-08 PROCEDURE — 80061 LIPID PANEL: CPT

## 2019-10-08 PROCEDURE — 80053 COMPREHEN METABOLIC PANEL: CPT

## 2019-10-09 ENCOUNTER — TELEPHONE (OUTPATIENT)
Dept: INTERNAL MEDICINE | Facility: CLINIC | Age: 74
End: 2019-10-09

## 2019-10-09 DIAGNOSIS — E11.65 UNCONTROLLED TYPE 2 DIABETES MELLITUS WITH HYPERGLYCEMIA: Primary | ICD-10-CM

## 2019-10-09 NOTE — TELEPHONE ENCOUNTER
Pls arrange visit with giovanni or Usha here in [facility] for uncontrolled DM.  Let pt know that his A1C is 8.5. He can see DM team and they can get him arranged to have Annetta.  His cholesterol looks ok, cont statin.  He has CKD//stage 3.  Avoid NSAIDs  His alk phosp is mildly elevated/but liver enzymes are normal.  This seems to be a chronic finding for him looking at prior labs.  Needs to be monitored.  Recheck labs in 4 mos, orders in.

## 2019-10-15 ENCOUNTER — CLINICAL SUPPORT (OUTPATIENT)
Dept: DIABETES | Facility: CLINIC | Age: 74
End: 2019-10-15
Payer: MEDICARE

## 2019-10-15 ENCOUNTER — OFFICE VISIT (OUTPATIENT)
Dept: DIABETES | Facility: CLINIC | Age: 74
End: 2019-10-15
Payer: MEDICARE

## 2019-10-15 VITALS
WEIGHT: 212.06 LBS | HEIGHT: 68 IN | BODY MASS INDEX: 32.14 KG/M2 | SYSTOLIC BLOOD PRESSURE: 93 MMHG | DIASTOLIC BLOOD PRESSURE: 63 MMHG

## 2019-10-15 DIAGNOSIS — E11.65 UNCONTROLLED TYPE 2 DIABETES MELLITUS WITH HYPERGLYCEMIA: Primary | ICD-10-CM

## 2019-10-15 LAB — GLUCOSE SERPL-MCNC: 130 MG/DL (ref 70–110)

## 2019-10-15 PROCEDURE — 99999 PR PBB SHADOW E&M-EST. PATIENT-LVL III: CPT | Mod: PBBFAC,,, | Performed by: NURSE PRACTITIONER

## 2019-10-15 PROCEDURE — 99215 OFFICE O/P EST HI 40 MIN: CPT | Mod: S$PBB,,, | Performed by: NURSE PRACTITIONER

## 2019-10-15 PROCEDURE — 99213 OFFICE O/P EST LOW 20 MIN: CPT | Mod: PBBFAC | Performed by: NURSE PRACTITIONER

## 2019-10-15 PROCEDURE — 99215 PR OFFICE/OUTPT VISIT, EST, LEVL V, 40-54 MIN: ICD-10-PCS | Mod: S$PBB,,, | Performed by: NURSE PRACTITIONER

## 2019-10-15 PROCEDURE — 99999 PR PBB SHADOW E&M-EST. PATIENT-LVL III: ICD-10-PCS | Mod: PBBFAC,,, | Performed by: NURSE PRACTITIONER

## 2019-10-15 PROCEDURE — 82962 GLUCOSE BLOOD TEST: CPT | Mod: PBBFAC | Performed by: NURSE PRACTITIONER

## 2019-10-15 PROCEDURE — 95250 CONT GLUC MNTR PHYS/QHP EQP: CPT | Mod: PBBFAC | Performed by: NURSE PRACTITIONER

## 2019-10-15 RX ORDER — INSULIN ASPART 100 [IU]/ML
7 INJECTION, SOLUTION INTRAVENOUS; SUBCUTANEOUS
Qty: 15 ML | Refills: 0 | Status: SHIPPED | OUTPATIENT
Start: 2019-10-15 | End: 2019-11-19

## 2019-10-15 RX ORDER — INSULIN ASPART 100 [IU]/ML
7 INJECTION, SOLUTION INTRAVENOUS; SUBCUTANEOUS
Qty: 1 BOX | Refills: 11 | Status: SHIPPED | OUTPATIENT
Start: 2019-10-15 | End: 2019-10-15 | Stop reason: SDUPTHER

## 2019-10-15 NOTE — PROGRESS NOTES
Patient is here in clinic today for placement of continuous glucose monitoring sensor CGMS. Site is at the back of his upper left arm. Site was cleaned and sensor was placed and stared. Patient didn't ask any further questions regarding sensor placement. Patient was instructed to continue all daily activities such as shower and also check blood glucose levels are instructed. Patient was also informed to avoid any imaging procedures and acetaminophen during his procedure. Patient voiced understanding of all instructions given. Patient is schedule to come back in two weeks for removal of sensor.

## 2019-10-15 NOTE — PATIENT INSTRUCTIONS
DECREASE LANTUS TO 65 UNITS DAILY    START NOVOLOG 7 UNITS WITH EACH MEAL. IF YOU DO NOT EAT, DO NOT TAKE IT.     IF YOU ARE HAPPY WITH YOUR VISIT TODAY, PLEASE FILL OUT THE SURVEY THAT MAY BE SENT TO YOUR EMAIL ADDRESS OR MAILBOX FOLLOWING THIS VISIT. WE LOVE TO HEAR YOUR COMMENTS! HAVE A WONDERFUL DAY!

## 2019-10-15 NOTE — LETTER
October 16, 2019      Hermelindo Ash MD  [address] [place] [address]           [facility]  [address]  [place] [address]  Phone: [phone]  Fax: [phone]          Patient: Mono Bergman   MR Number: [id number]   YOB: 1945   Date of Visit: 10/15/2019       Dear Dr. Hermelindo Ash:    Thank you for referring Mono Bergman to me for evaluation. Attached you will find relevant portions of my assessment and plan of care.    If you have questions, please do not hesitate to call me. I look forward to following Mono Bergman along with you.    Sincerely,    Usha Ortega, NP    Enclosure  CC:  No Recipients    If you would like to receive this communication electronically, please contact [email or web address] or [phone] to request more information on [employer] access.    For providers and/or their staff who would like to refer a patient to [facility], please contact us through our one-stop-shop provider referral line, [facility], at [phone].    If you feel you have received this communication in error or would no longer like to receive these types of communications, please e-mail [email or web address]

## 2019-10-16 NOTE — PROGRESS NOTES
"Subjective:         Patient ID: Mono Bergman is a 74 y.o. male.  Patient's current PCP is [facility].     Chief Complaint: Diabetes Mellitus    HPI  Mono Bergman is a 74 y.o. White male presenting for new consultation for Diabetes. Patient has been diagnosed with diabetes for many yearsand has the following complications associated with diabetes: gastroparesis, CKD,  retinopathy. Blood glucose testing is performed regularly. In the past 2 weeks patient reports blood glucose values to have approximately ranged in the 200's.      He denies any recent hospital admissions or emergency room visits related to Diabetes Mellitus.      Height: 5' 8" (172.7 cm)  //  Weight: 96.2 kg (212 lb 1.3 oz), Body mass index is 32.25 kg/m².  His blood sugar in clinic today is:   Lab Results   Component Value Date    POCGLU 130 (A) 10/15/2019       Labs reviewed and are noted below.    His most recent A1C is:   Lab Results   Component Value Date    HGBA1C 8.5 (H) 10/08/2019     No results found for: CPEPTIDE  No results found for: GLUTAMICACID  No components found for: LDL    CURRENT DM MEDICATIONS:   Current Outpatient Medications   Medication Sig Dispense Refill    insulin glargine (LANTUS) 100 unit/mL injection Inject 85 Units into the skin once daily.       No current facility-administered medications for this visit.        Health Maintenance   Topic Date Due    Foot Exam  08/17/1957    Urine Microalbumin  08/17/1957    TETANUS VACCINE  08/17/1965    Colonoscopy  02/03/2018    Eye Exam  11/05/2019    Hemoglobin A1c  04/08/2020    PROSTATE-SPECIFIC ANTIGEN  10/03/2020    Lipid Panel  10/08/2020    Low Dose Statin  10/15/2020    Hepatitis C Screening  Completed    Pneumococcal Vaccine (65+ Low/Medium Risk)  Completed       STANDARDS OF CARE:  Current Ophthalmologist/Optometrist: Dr. Tucker. Visits up to date  [facility]   ACE/ARB: Yes  Statin: Yes  He  has " attended diabetes education in the past.     LIFESTYLE:    BLOOD GLUCOSE TESTING: Patient reports testing 4 times per day.       Review of Systems   Constitutional: Negative.  Negative for activity change, appetite change, fatigue and unexpected weight change.   Eyes: Negative for visual disturbance.   Gastrointestinal: Negative for constipation, diarrhea and nausea.   Endocrine: Negative.  Negative for cold intolerance, heat intolerance, polydipsia, polyphagia and polyuria.   Skin: Negative for wound.         Objective:      Physical Exam   Constitutional: He appears well-developed and well-nourished.   HENT:   Head: Normocephalic and atraumatic.   Cardiovascular: Normal rate.   Pulmonary/Chest: Effort normal.   Skin: Skin is warm and dry.   Psychiatric: He has a normal mood and affect. His speech is normal and behavior is normal. Judgment and thought content normal.   Nursing note and vitals reviewed.      Assessment:       1. Uncontrolled type 2 diabetes mellitus with hyperglycemia        Plan:   Uncontrolled type 2 diabetes mellitus with hyperglycemia  -     Ambulatory Referral to Diabetes Education  -     POCT Glucose, Hand-Held Device  -     insulin aspart U-100 (NOVOLOG FLEXPEN U-100 INSULIN) 100 unit/mL (3 mL) InPn pen; Inject 7 Units into the skin 3 (three) times daily with meals.  Dispense: 15 mL; Refill: 0    - Condition uncontrolled. Patient likely is over-basalized without any correction for prandial glucoses. Decrease Lantus to 65 units daily. DM education reviewed. Patient encouraged to carb count and exercise per recommendations. Labs and Referrals as noted. Patient instructed to send in log weekly for review and potential medication adjustments. RV scheduled in 1 month.     Additional Details:    1. Patient was instructed to monitor blood glucose 2 - 3 x daily, fasting and ac dinner or at bedtime. Discussed ADA goals for blood glucose levels and hypoglycemic protocol.  Reminded to bring BG records  or meter to each visit for review.    2. The patient was explained the above plan and given opportunity to ask questions.  He understands, chooses and consents to this plan and accepts all the risks, which include but are not limited to the risks mentioned above. He understands the alternative of having no testing, interventions or treatments at this time. He left content and without further questions.     A total of 60 minutes was spent in face to face time, of which over 50% was spent in counseling patient on disease process, complications, treatment, and side effects of medications.        Usha Marquez NP-YOHANNES

## 2019-10-31 ENCOUNTER — TELEPHONE (OUTPATIENT)
Dept: DIABETES | Facility: CLINIC | Age: 74
End: 2019-10-31

## 2019-10-31 NOTE — TELEPHONE ENCOUNTER
----- Message from Patt Dao sent at 10/31/2019 10:09 AM CDT -----  Contact: Tammy-Izabella   Requesting a call back to schedule nurse visit on 11/19 at 11:00 am. Please call back at [phone].      Thanks,  Patt Dao

## 2019-10-31 NOTE — TELEPHONE ENCOUNTER
Called pt regarding concerns pt stated wanting time to be changed on appt I changed it another time

## 2019-11-13 ENCOUNTER — TELEPHONE (OUTPATIENT)
Dept: DIABETES | Facility: CLINIC | Age: 74
End: 2019-11-13

## 2019-11-13 NOTE — TELEPHONE ENCOUNTER
----- Message from Christin Nino sent at 11/13/2019  2:24 PM CST -----  Contact: pt   Call pt in regards to patch that he has in his arm. Caller states that the patch is coming off and he want to know if he can take it off and put it in a plastic bag. Pt states that if he don't answer to please leave a message telling him what to do.     [phone] (home)

## 2019-11-13 NOTE — TELEPHONE ENCOUNTER
Called pt regarding concerns pt wanted to knowe if he could take sensor off since its coming off I let pt know its ok and to put it in a zip lock bag

## 2019-11-19 ENCOUNTER — OFFICE VISIT (OUTPATIENT)
Dept: DIABETES | Facility: CLINIC | Age: 74
End: 2019-11-19
Payer: MEDICARE

## 2019-11-19 VITALS
BODY MASS INDEX: 31.44 KG/M2 | DIASTOLIC BLOOD PRESSURE: 83 MMHG | WEIGHT: 207.44 LBS | SYSTOLIC BLOOD PRESSURE: 124 MMHG | HEIGHT: 68 IN

## 2019-11-19 DIAGNOSIS — I15.2 HYPERTENSION ASSOCIATED WITH DIABETES: ICD-10-CM

## 2019-11-19 DIAGNOSIS — E11.69 HYPERLIPIDEMIA ASSOCIATED WITH TYPE 2 DIABETES MELLITUS: ICD-10-CM

## 2019-11-19 DIAGNOSIS — E78.5 HYPERLIPIDEMIA ASSOCIATED WITH TYPE 2 DIABETES MELLITUS: ICD-10-CM

## 2019-11-19 DIAGNOSIS — E11.59 HYPERTENSION ASSOCIATED WITH DIABETES: ICD-10-CM

## 2019-11-19 DIAGNOSIS — E11.9 TYPE 2 DIABETES MELLITUS WITH HEMOGLOBIN A1C GOAL OF LESS THAN 7.0%: Primary | ICD-10-CM

## 2019-11-19 LAB — GLUCOSE SERPL-MCNC: 184 MG/DL (ref 70–110)

## 2019-11-19 PROCEDURE — 1159F PR MEDICATION LIST DOCUMENTED IN MEDICAL RECORD: ICD-10-PCS | Mod: ,,, | Performed by: NURSE PRACTITIONER

## 2019-11-19 PROCEDURE — 1126F PR PAIN SEVERITY QUANTIFIED, NO PAIN PRESENT: ICD-10-PCS | Mod: ,,, | Performed by: NURSE PRACTITIONER

## 2019-11-19 PROCEDURE — 2024F PR 7 FIELD PHOTOS WITH INTERP/ REVIEW: ICD-10-PCS | Mod: ,,, | Performed by: NURSE PRACTITIONER

## 2019-11-19 PROCEDURE — 82962 GLUCOSE BLOOD TEST: CPT | Mod: PBBFAC | Performed by: NURSE PRACTITIONER

## 2019-11-19 PROCEDURE — 2024F 7 FLD RTA PHOTO EVC RTNOPTHY: CPT | Mod: ,,, | Performed by: NURSE PRACTITIONER

## 2019-11-19 PROCEDURE — 99999 PR PBB SHADOW E&M-EST. PATIENT-LVL III: ICD-10-PCS | Mod: PBBFAC,,, | Performed by: NURSE PRACTITIONER

## 2019-11-19 PROCEDURE — 1159F MED LIST DOCD IN RCRD: CPT | Mod: ,,, | Performed by: NURSE PRACTITIONER

## 2019-11-19 PROCEDURE — 99999 PR PBB SHADOW E&M-EST. PATIENT-LVL III: CPT | Mod: PBBFAC,,, | Performed by: NURSE PRACTITIONER

## 2019-11-19 PROCEDURE — 95251 CONT GLUC MNTR ANALYSIS I&R: CPT | Mod: ,,, | Performed by: NURSE PRACTITIONER

## 2019-11-19 PROCEDURE — 99215 PR OFFICE/OUTPT VISIT, EST, LEVL V, 40-54 MIN: ICD-10-PCS | Mod: S$PBB,,, | Performed by: NURSE PRACTITIONER

## 2019-11-19 PROCEDURE — 99213 OFFICE O/P EST LOW 20 MIN: CPT | Mod: PBBFAC | Performed by: NURSE PRACTITIONER

## 2019-11-19 PROCEDURE — 99215 OFFICE O/P EST HI 40 MIN: CPT | Mod: S$PBB,,, | Performed by: NURSE PRACTITIONER

## 2019-11-19 PROCEDURE — 95251 PR GLUCOSE MONITOR, 72 HOUR, PHYS INTERP: ICD-10-PCS | Mod: ,,, | Performed by: NURSE PRACTITIONER

## 2019-11-19 PROCEDURE — 1126F AMNT PAIN NOTED NONE PRSNT: CPT | Mod: ,,, | Performed by: NURSE PRACTITIONER

## 2019-11-19 RX ORDER — INSULIN ASPART 100 [IU]/ML
INJECTION, SOLUTION INTRAVENOUS; SUBCUTANEOUS
Qty: 15 ML | Refills: 0 | Status: SHIPPED | OUTPATIENT
Start: 2019-11-19 | End: 2021-03-15

## 2019-11-19 NOTE — PROGRESS NOTES
"Subjective:         Patient ID: Mono Bergman is a 74 y.o. male.  Patient's current PCP is [facility].     Chief Complaint: Diabetes Mellitus    HPI  Mono Bergman is a 74 y.o. White male presenting for follow up for Diabetes. Patient has been diagnosed with diabetes for many yearsand has the following complications associated with diabetes: gastroparesis, CKD,  retinopathy.  At last visit, medication regimen was adjusted due to uncontrolled glucoses. Lantus was decreased and Novolog was initiated. CGMS was placed for further assessment.     Interpretation of CGMS as follows:  Standard Deviation: 67.9  Average Blood Glucose: 177  Time in Glucose Target Range: 49%  Time Above Glucose Target Range: 47%  Time Below Glucose Target Range: 4%            He denies any recent hospital admissions or emergency room visits related to Diabetes Mellitus.      Height: 5' 8" (172.7 cm)  //  Weight: 94.1 kg (207 lb 7.3 oz), Body mass index is 31.54 kg/m².  His blood sugar in clinic today is:   Lab Results   Component Value Date    POCGLU 184 (A) 11/19/2019       Labs reviewed and are noted below.    His most recent A1C is:   Lab Results   Component Value Date    HGBA1C 8.5 (H) 10/08/2019     No results found for: CPEPTIDE  No results found for: GLUTAMICACID  No components found for: LDL    CURRENT DM MEDICATIONS:   Current Outpatient Medications   Medication Sig Dispense Refill    insulin glargine (LANTUS) 100 unit/mL injection    Novolog 7 units with each meal Inject 65 Units into the skin once daily.       No current facility-administered medications for this visit.        Health Maintenance   Topic Date Due    Foot Exam  08/17/1957    Urine Microalbumin  08/17/1957    TETANUS VACCINE  08/17/1965    Colonoscopy  02/03/2018    Eye Exam  11/05/2019    Hemoglobin A1c  04/08/2020    PROSTATE-SPECIFIC ANTIGEN  10/03/2020    Lipid Panel  10/08/2020    Low Dose Statin  11/19/2020 "    Hepatitis C Screening  Completed    Pneumococcal Vaccine (65+ Low/Medium Risk)  Completed       STANDARDS OF CARE:  Current Ophthalmologist/Optometrist: Dr. Tucker. Visits up to date  [facility]   ACE/ARB: Yes  Statin: Yes  He  has attended diabetes education in the past.     LIFESTYLE:    BLOOD GLUCOSE TESTING: Patient reports testing 4 times per day.       Review of Systems   Constitutional: Negative.  Negative for activity change, appetite change, fatigue and unexpected weight change.   Eyes: Negative for visual disturbance.   Gastrointestinal: Negative for constipation, diarrhea and nausea.   Endocrine: Negative.  Negative for cold intolerance, heat intolerance, polydipsia, polyphagia and polyuria.   Skin: Negative for wound.         Objective:      Physical Exam   Constitutional: He appears well-developed and well-nourished.   HENT:   Head: Normocephalic and atraumatic.   Cardiovascular: Normal rate.   Pulmonary/Chest: Effort normal.   Skin: Skin is warm and dry.   Psychiatric: He has a normal mood and affect. His speech is normal and behavior is normal. Judgment and thought content normal.   Nursing note and vitals reviewed.      Assessment:       1. Type 2 diabetes mellitus with hemoglobin A1c goal of less than 7.0%    2. Hyperlipidemia associated with type 2 diabetes mellitus    3. Hypertension associated with diabetes        Plan:   Type 2 diabetes mellitus with hemoglobin A1c goal of less than 7.0%  -     POCT Glucose, Hand-Held Device  -     blood-glucose transmitter (DEXCOM G6 TRANSMITTER) Calista; Use per 's instructions  Dispense: 1 Device; Refill: 3  -     blood-glucose sensor (DEXCOM G6 SENSOR) Calista; Use one per 10 days  per 's instructions  Dispense: 1 Device; Refill: 11  -     blood-glucose meter,continuous (DEXCOM G6 ) Misc; Use per 's instructions  Dispense: 1 each; Refill: 0  -     insulin aspart U-100 (NOVOLOG FLEXPEN U-100 INSULIN)  100 unit/mL (3 mL) InPn pen; 8 UNITS WITH BREAKFAST 12 UNITS WITH LUNCH 12 UNITS WITH DINNER  Dispense: 15 mL; Refill: 0  -     Lantus 55 units daily    - Condition not quite to goal. Changes as noted above. Decrease Lantus and Increase Novolog. DM education reviewed. Patient encouraged to carb count and exercise per recommendations. Labs and Referrals as noted. Patient instructed to send in log weekly for review and potential medication adjustments. RV scheduled in 2 weeks.     Hyperlipidemia associated with type 2 diabetes mellitus    - LDL at goal    Hypertension associated with diabetes    - Stable      Additional Details:    1. Patient was instructed to monitor blood glucose 4 x daily, fasting and ac dinner or at bedtime. Discussed ADA goals for blood glucose levels and hypoglycemic protocol.  Reminded to bring BG records or meter to each visit for review.    2. The patient was explained the above plan and given opportunity to ask questions.  He understands, chooses and consents to this plan and accepts all the risks, which include but are not limited to the risks mentioned above. He understands the alternative of having no testing, interventions or treatments at this time. He left content and without further questions.     A total of 45 minutes was spent in face to face time, of which over 50% was spent in counseling patient on disease process, complications, treatment, and side effects of medications.        LUCY Martinez

## 2019-11-19 NOTE — Clinical Note
Patient needs eye examwith Dr. Tucker before end of the year, he has had procedures, but no annual. Please get with the staff to see if they can get this scheduled.

## 2019-11-19 NOTE — PATIENT INSTRUCTIONS
DECREASE LANTUS TO 55 UNITS ONCE DAILY    INCREASE NOVOLOG TO 8 UNITS WITH BREAKFAST AND 12 UNITS WITH LUNCH AND DINNER.     IF YOU ARE HAPPY WITH YOUR VISIT TODAY, PLEASE FILL OUT THE SURVEY THAT MAY BE SENT TO YOUR EMAIL ADDRESS OR MAILBOX FOLLOWING THIS VISIT. WE LOVE TO HEAR YOUR COMMENTS! HAVE A WONDERFUL DAY!

## 2019-12-05 ENCOUNTER — PROCEDURE VISIT (OUTPATIENT)
Dept: OPHTHALMOLOGY | Facility: CLINIC | Age: 74
End: 2019-12-05
Payer: MEDICARE

## 2019-12-05 DIAGNOSIS — E11.3513 TYPE 2 DIABETES MELLITUS WITH BOTH EYES AFFECTED BY PROLIFERATIVE RETINOPATHY AND MACULAR EDEMA, WITH LONG-TERM CURRENT USE OF INSULIN: Primary | ICD-10-CM

## 2019-12-05 DIAGNOSIS — Z79.4 TYPE 2 DIABETES MELLITUS WITH BOTH EYES AFFECTED BY PROLIFERATIVE RETINOPATHY AND MACULAR EDEMA, WITH LONG-TERM CURRENT USE OF INSULIN: Primary | ICD-10-CM

## 2019-12-05 PROCEDURE — 67028 INJECTION EYE DRUG: CPT | Mod: PBBFAC,LT | Performed by: OPHTHALMOLOGY

## 2019-12-05 PROCEDURE — 99499 NO LOS: ICD-10-PCS | Mod: S$PBB,,, | Performed by: OPHTHALMOLOGY

## 2019-12-05 PROCEDURE — 67028 PR INJECT INTRAVITREAL PHARMCOLOGIC: ICD-10-PCS | Mod: S$PBB,LT,, | Performed by: OPHTHALMOLOGY

## 2019-12-05 PROCEDURE — 67028 INJECTION EYE DRUG: CPT | Mod: S$PBB,LT,, | Performed by: OPHTHALMOLOGY

## 2019-12-05 PROCEDURE — 99499 UNLISTED E&M SERVICE: CPT | Mod: S$PBB,,, | Performed by: OPHTHALMOLOGY

## 2019-12-05 RX ADMIN — AFLIBERCEPT 2 MG: 40 INJECTION, SOLUTION INTRAVITREAL at 11:12

## 2019-12-05 NOTE — PROGRESS NOTES
===============================  Mono Bergman,   74 y.o. male   Last visit [facility]: :8/15/2019   Last visit eye dept. 8/15/2019  [facility]:  Uncorrected distance visual acuity was 20/60 in the right eye and 20/100 in the left eye.   Not recorded         Not recorded         Not recorded         Not recorded        Chief Complaint   Patient presents with    pdr     lost to follow up, pt states [facility] is stable          ________________  12/5/2019  HPI     pdr      Additional comments: lost to follow up, pt states [facility] is stable              Comments      1.) + DM SINCE 1997  2.) + PDR OU/ CSME OS  Bilateral PRP  PPV OS with Dr. 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 TEARS BID OS  REFRESH GEL BID OS   OMEGA E BID P.O.          Last edited by GONZALO Keith on 12/5/2019 11:03 AM. (History)      Problem List Items Addressed This Visit        Eye/Vision problems    Type 2 diabetes mellitus with both eyes affected by proliferative retinopathy and macular edema, with long-term current use of insulin - Primary    Relevant Medications    aflibercept Soln 2 mg (Completed)    Other Relevant Orders    Prior Authorization Order    Prior Authorization Order        od is at [facility]  Os dme    todat begin 2-3 trial      12/5/2019  Diagnosis :  Os dme  Today:   Eylea (afibercept) 2 mg/0.05 ml Intravitreal Injection , OS   Follow up: rtc  1 mo os tril a#2        Instructed to call 24/7 for any worsening of vision. Check Both eyes daily. Gave patient my home phone number.  Risks, benefits, and alternatives to treatment discussed in detail with the patient.  The patient voiced understanding and wished to proceed with the procedure     Injection Procedure Note:       Patient Identified and Time Out complete  Subconjunctival bleb - xylocaine with epi 2%   and  Betadine.  Inject eylea OS at 6:00 @ 3.5-4mm posterior to limbus  Post Operative Dx: Same  Complications: None  Follow up as above.    .       ===========================

## 2019-12-09 ENCOUNTER — TELEPHONE (OUTPATIENT)
Dept: DIABETES | Facility: CLINIC | Age: 74
End: 2019-12-09

## 2019-12-09 NOTE — TELEPHONE ENCOUNTER
Appointments mailed  ----- Message from Angelica Beasley sent at 12/9/2019 12:12 PM CST -----  Contact: pt  States he's blind and is hearing impaired /pt wants to have his appt mailed to him and the directions so that he can have it for the person who's bringing him and can be reached at [phone]/thanks/dbw     Speak slowly when calling if leaving a VM

## 2020-01-07 ENCOUNTER — OFFICE VISIT (OUTPATIENT)
Dept: DIABETES | Facility: CLINIC | Age: 75
End: 2020-01-07
Payer: MEDICARE

## 2020-01-07 VITALS
HEIGHT: 68 IN | WEIGHT: 212.31 LBS | SYSTOLIC BLOOD PRESSURE: 120 MMHG | BODY MASS INDEX: 32.18 KG/M2 | DIASTOLIC BLOOD PRESSURE: 63 MMHG

## 2020-01-07 DIAGNOSIS — E11.59 HYPERTENSION ASSOCIATED WITH DIABETES: ICD-10-CM

## 2020-01-07 DIAGNOSIS — E11.9 TYPE 2 DIABETES MELLITUS WITH HEMOGLOBIN A1C GOAL OF LESS THAN 7.0%: Primary | ICD-10-CM

## 2020-01-07 DIAGNOSIS — E78.5 HYPERLIPIDEMIA ASSOCIATED WITH TYPE 2 DIABETES MELLITUS: ICD-10-CM

## 2020-01-07 DIAGNOSIS — I15.2 HYPERTENSION ASSOCIATED WITH DIABETES: ICD-10-CM

## 2020-01-07 DIAGNOSIS — E11.69 HYPERLIPIDEMIA ASSOCIATED WITH TYPE 2 DIABETES MELLITUS: ICD-10-CM

## 2020-01-07 LAB — GLUCOSE SERPL-MCNC: 185 MG/DL (ref 70–110)

## 2020-01-07 PROCEDURE — 1159F MED LIST DOCD IN RCRD: CPT | Mod: ,,, | Performed by: NURSE PRACTITIONER

## 2020-01-07 PROCEDURE — 99214 OFFICE O/P EST MOD 30 MIN: CPT | Mod: S$PBB,,, | Performed by: NURSE PRACTITIONER

## 2020-01-07 PROCEDURE — 1126F AMNT PAIN NOTED NONE PRSNT: CPT | Mod: ,,, | Performed by: NURSE PRACTITIONER

## 2020-01-07 PROCEDURE — 1159F PR MEDICATION LIST DOCUMENTED IN MEDICAL RECORD: ICD-10-PCS | Mod: ,,, | Performed by: NURSE PRACTITIONER

## 2020-01-07 PROCEDURE — 99999 PR PBB SHADOW E&M-EST. PATIENT-LVL III: CPT | Mod: PBBFAC,,, | Performed by: NURSE PRACTITIONER

## 2020-01-07 PROCEDURE — 99213 OFFICE O/P EST LOW 20 MIN: CPT | Mod: PBBFAC | Performed by: NURSE PRACTITIONER

## 2020-01-07 PROCEDURE — 1126F PR PAIN SEVERITY QUANTIFIED, NO PAIN PRESENT: ICD-10-PCS | Mod: ,,, | Performed by: NURSE PRACTITIONER

## 2020-01-07 PROCEDURE — 99999 PR PBB SHADOW E&M-EST. PATIENT-LVL III: ICD-10-PCS | Mod: PBBFAC,,, | Performed by: NURSE PRACTITIONER

## 2020-01-07 PROCEDURE — 99214 PR OFFICE/OUTPT VISIT, EST, LEVL IV, 30-39 MIN: ICD-10-PCS | Mod: S$PBB,,, | Performed by: NURSE PRACTITIONER

## 2020-01-07 PROCEDURE — 82962 GLUCOSE BLOOD TEST: CPT | Mod: PBBFAC | Performed by: NURSE PRACTITIONER

## 2020-01-07 NOTE — PROGRESS NOTES
"Subjective:         Patient ID: Mono Bergman is a 74 y.o. male.  Patient's current PCP is [facility].     Chief Complaint: Diabetes Mellitus    HPI  Mono Bergman is a 74 y.o. White male presenting for follow up for Diabetes. Patient has been diagnosed with diabetes for many yearsand has the following complications associated with diabetes: gastroparesis, CKD,  retinopathy.  At last visit, medication regimen was adjusted due to uncontrolled glucoses. Lantus was decreased and Novolog was initiated.Patient reports that he has self adjusted a bit and is taking 55 units once daily of Lantus, and Humalog 12 units with meals. He reports that glucoses are controlled. He is less than 130 fasting, less than 180 PP.     Dexcom Application Pending.     He denies any recent hospital admissions or emergency room visits related to Diabetes Mellitus.      Height: 5' 8" (172.7 cm)  //  Weight: 96.3 kg (212 lb 4.9 oz), Body mass index is 32.28 kg/m².  His blood sugar in clinic today is:   Lab Results   Component Value Date    POCGLU 185 (A) 01/07/2020       Labs reviewed and are noted below.    His most recent A1C is:   Lab Results   Component Value Date    HGBA1C 8.5 (H) 10/08/2019     No results found for: CPEPTIDE  No results found for: GLUTAMICACID  No components found for: LDL    CURRENT DM MEDICATIONS:   Current Outpatient Medications   Medication Sig Dispense Refill    insulin glargine (LANTUS) 100 unit/mL injection    Novolog 12 units with each meal Inject 55 Units into the skin once daily.       No current facility-administered medications for this visit.        Health Maintenance   Topic Date Due    Foot Exam  08/17/1957    Urine Microalbumin  08/17/1957    TETANUS VACCINE  08/17/1965    Colonoscopy  02/03/2018    Eye Exam  11/05/2019    Hemoglobin A1c  04/08/2020    PROSTATE-SPECIFIC ANTIGEN  10/03/2020    Lipid Panel  10/08/2020    Low Dose Statin  12/05/2020    " Hepatitis C Screening  Completed    Pneumococcal Vaccine (65+ Low/Medium Risk)  Completed       STANDARDS OF CARE:  Current Ophthalmologist/Optometrist: Dr. Tucker.   [facility] Requests [facility]iatry at [facility].   ACE/ARB: Yes  Statin: Yes  He  has attended diabetes education in the past.     LIFESTYLE:    BLOOD GLUCOSE TESTING: Patient reports testing 4 times per day.       Review of Systems   Constitutional: Negative.  Negative for activity change, appetite change, fatigue and unexpected weight change.   Eyes: Negative for visual disturbance.   Gastrointestinal: Negative for constipation, diarrhea and nausea.   Endocrine: Negative.  Negative for cold intolerance, heat intolerance, polydipsia, polyphagia and polyuria.   Skin: Negative for wound.         Objective:      Physical Exam   Constitutional: He appears well-developed and well-nourished.   HENT:   Head: Normocephalic and atraumatic.   Cardiovascular: Normal rate.   Pulmonary/Chest: Effort normal.   Skin: Skin is warm and dry.   Psychiatric: He has a normal mood and affect. His speech is normal and behavior is normal. Judgment and thought content normal.   Nursing note and vitals reviewed.      Assessment:       1. Type 2 diabetes mellitus with hemoglobin A1c goal of less than 7.0%    2. Hyperlipidemia associated with type 2 diabetes mellitus    3. Hypertension associated with diabetes        Plan:   Type 2 diabetes mellitus with hemoglobin A1c goal of less than 7.0%  -     POCT Glucose, Hand-Held Device  -     Ambulatory consult to Podiatry    - Condition at goal based on recall. Awaiting Dexcom approval and shipment. Will make further changes at that time if necessary. DM education reviewed. Patient encouraged to carb count and exercise per recommendations. Labs and Referrals as noted. Patient instructed to send in log weekly for review and potential medication adjustments. RV scheduled in 4 weeks.     Hyperlipidemia associated with type 2  diabetes mellitus    - LDL at goal    Hypertension associated with diabetes    - Stable      Additional Details:    1. Patient was instructed to monitor blood glucose 4 x daily, fasting and ac dinner or at bedtime. Discussed ADA goals for blood glucose levels and hypoglycemic protocol.  Reminded to bring BG records or meter to each visit for review.    2. The patient was explained the above plan and given opportunity to ask questions.  He understands, chooses and consents to this plan and accepts all the risks, which include but are not limited to the risks mentioned above. He understands the alternative of having no testing, interventions or treatments at this time. He left content and without further questions.     A total of 45 minutes was spent in face to face time, of which over 50% was spent in counseling patient on disease process, complications, treatment, and side effects of medications.        LUCY Martinez

## 2020-01-07 NOTE — PATIENT INSTRUCTIONS
DECREASE LANTUS TO 55 UNITS ONCE DAILY    INCREASE NOVOLOG TO 12 UNITS WITH BREAKFAST, LUNCH, AND DINNER.     IF YOU ARE HAPPY WITH YOUR VISIT TODAY, PLEASE FILL OUT THE SURVEY THAT MAY BE SENT TO YOUR EMAIL ADDRESS OR MAILBOX FOLLOWING THIS VISIT. WE LOVE TO HEAR YOUR COMMENTS! HAVE A WONDERFUL DAY!

## 2020-02-06 ENCOUNTER — OFFICE VISIT (OUTPATIENT)
Dept: DIABETES | Facility: CLINIC | Age: 75
End: 2020-02-06
Payer: MEDICARE

## 2020-02-06 ENCOUNTER — PROCEDURE VISIT (OUTPATIENT)
Dept: OPHTHALMOLOGY | Facility: CLINIC | Age: 75
End: 2020-02-06
Payer: MEDICARE

## 2020-02-06 ENCOUNTER — LAB VISIT (OUTPATIENT)
Dept: LAB | Facility: HOSPITAL | Age: 75
End: 2020-02-06
Attending: FAMILY MEDICINE
Payer: MEDICARE

## 2020-02-06 ENCOUNTER — PATIENT OUTREACH (OUTPATIENT)
Dept: ADMINISTRATIVE | Facility: HOSPITAL | Age: 75
End: 2020-02-06

## 2020-02-06 ENCOUNTER — OFFICE VISIT (OUTPATIENT)
Dept: PODIATRY | Facility: CLINIC | Age: 75
End: 2020-02-06
Payer: MEDICARE

## 2020-02-06 VITALS
SYSTOLIC BLOOD PRESSURE: 91 MMHG | HEART RATE: 72 BPM | WEIGHT: 212.31 LBS | HEIGHT: 68 IN | DIASTOLIC BLOOD PRESSURE: 56 MMHG | BODY MASS INDEX: 32.18 KG/M2

## 2020-02-06 VITALS
HEIGHT: 68 IN | WEIGHT: 211.88 LBS | DIASTOLIC BLOOD PRESSURE: 77 MMHG | BODY MASS INDEX: 32.11 KG/M2 | SYSTOLIC BLOOD PRESSURE: 106 MMHG

## 2020-02-06 DIAGNOSIS — E11.3513 TYPE 2 DIABETES MELLITUS WITH BOTH EYES AFFECTED BY PROLIFERATIVE RETINOPATHY AND MACULAR EDEMA, WITH LONG-TERM CURRENT USE OF INSULIN: Primary | ICD-10-CM

## 2020-02-06 DIAGNOSIS — M79.674 PAIN DUE TO ONYCHOMYCOSIS OF TOENAILS OF BOTH FEET: ICD-10-CM

## 2020-02-06 DIAGNOSIS — B35.1 DERMATOPHYTOSIS OF NAIL: ICD-10-CM

## 2020-02-06 DIAGNOSIS — B35.1 PAIN DUE TO ONYCHOMYCOSIS OF TOENAILS OF BOTH FEET: ICD-10-CM

## 2020-02-06 DIAGNOSIS — E11.42 DM TYPE 2 WITH DIABETIC PERIPHERAL NEUROPATHY: ICD-10-CM

## 2020-02-06 DIAGNOSIS — M79.675 PAIN DUE TO ONYCHOMYCOSIS OF TOENAILS OF BOTH FEET: ICD-10-CM

## 2020-02-06 DIAGNOSIS — Z79.4 TYPE 2 DIABETES MELLITUS WITH BOTH EYES AFFECTED BY PROLIFERATIVE RETINOPATHY AND MACULAR EDEMA, WITH LONG-TERM CURRENT USE OF INSULIN: Primary | ICD-10-CM

## 2020-02-06 DIAGNOSIS — E11.9 TYPE 2 DIABETES MELLITUS WITH HEMOGLOBIN A1C GOAL OF LESS THAN 7.0%: Primary | ICD-10-CM

## 2020-02-06 DIAGNOSIS — E11.65 UNCONTROLLED TYPE 2 DIABETES MELLITUS WITH HYPERGLYCEMIA: Primary | ICD-10-CM

## 2020-02-06 DIAGNOSIS — E11.65 UNCONTROLLED TYPE 2 DIABETES MELLITUS WITH HYPERGLYCEMIA: ICD-10-CM

## 2020-02-06 DIAGNOSIS — E11.59 HYPERTENSION ASSOCIATED WITH DIABETES: ICD-10-CM

## 2020-02-06 DIAGNOSIS — E78.5 HYPERLIPIDEMIA ASSOCIATED WITH TYPE 2 DIABETES MELLITUS: ICD-10-CM

## 2020-02-06 DIAGNOSIS — E11.69 HYPERLIPIDEMIA ASSOCIATED WITH TYPE 2 DIABETES MELLITUS: ICD-10-CM

## 2020-02-06 DIAGNOSIS — I15.2 HYPERTENSION ASSOCIATED WITH DIABETES: ICD-10-CM

## 2020-02-06 LAB
ALBUMIN SERPL BCP-MCNC: 3.8 G/DL (ref 3.5–5.2)
ALP SERPL-CCNC: 188 U/L (ref 55–135)
ALT SERPL W/O P-5'-P-CCNC: 29 U/L (ref 10–44)
ANION GAP SERPL CALC-SCNC: 13 MMOL/L (ref 8–16)
AST SERPL-CCNC: 24 U/L (ref 10–40)
BILIRUB SERPL-MCNC: 0.4 MG/DL (ref 0.1–1)
BUN SERPL-MCNC: 32 MG/DL (ref 8–23)
CALCIUM SERPL-MCNC: 9.7 MG/DL (ref 8.7–10.5)
CHLORIDE SERPL-SCNC: 105 MMOL/L (ref 95–110)
CO2 SERPL-SCNC: 22 MMOL/L (ref 23–29)
CREAT SERPL-MCNC: 1.4 MG/DL (ref 0.5–1.4)
EST. GFR  (AFRICAN AMERICAN): 57.6 ML/MIN/1.73 M^2
EST. GFR  (NON AFRICAN AMERICAN): 49.8 ML/MIN/1.73 M^2
ESTIMATED AVG GLUCOSE: 171 MG/DL (ref 68–131)
GLUCOSE SERPL-MCNC: 192 MG/DL (ref 70–110)
GLUCOSE SERPL-MCNC: 222 MG/DL (ref 70–110)
HBA1C MFR BLD HPLC: 7.6 % (ref 4–5.6)
POTASSIUM SERPL-SCNC: 5.3 MMOL/L (ref 3.5–5.1)
PROT SERPL-MCNC: 7.4 G/DL (ref 6–8.4)
SODIUM SERPL-SCNC: 140 MMOL/L (ref 136–145)

## 2020-02-06 PROCEDURE — 99999 PR PBB SHADOW E&M-EST. PATIENT-LVL IV: ICD-10-PCS | Mod: PBBFAC,,, | Performed by: PODIATRIST

## 2020-02-06 PROCEDURE — 11720 DEBRIDE NAIL 1-5: CPT | Mod: 59,Q9,S$PBB, | Performed by: PODIATRIST

## 2020-02-06 PROCEDURE — 99999 PR PBB SHADOW E&M-EST. PATIENT-LVL III: CPT | Mod: PBBFAC,,, | Performed by: NURSE PRACTITIONER

## 2020-02-06 PROCEDURE — 92014 PR EYE EXAM, EST PATIENT,COMPREHESV: ICD-10-PCS | Mod: 25,S$PBB,, | Performed by: OPHTHALMOLOGY

## 2020-02-06 PROCEDURE — 99999 PR PBB SHADOW E&M-EST. PATIENT-LVL IV: CPT | Mod: PBBFAC,,, | Performed by: PODIATRIST

## 2020-02-06 PROCEDURE — 99213 OFFICE O/P EST LOW 20 MIN: CPT | Mod: PBBFAC | Performed by: NURSE PRACTITIONER

## 2020-02-06 PROCEDURE — 99203 OFFICE O/P NEW LOW 30 MIN: CPT | Mod: 25,S$PBB,, | Performed by: PODIATRIST

## 2020-02-06 PROCEDURE — 2024F PR 7 FIELD PHOTOS WITH INTERP/ REVIEW: ICD-10-PCS | Mod: ,,, | Performed by: NURSE PRACTITIONER

## 2020-02-06 PROCEDURE — 2024F 7 FLD RTA PHOTO EVC RTNOPTHY: CPT | Mod: ,,, | Performed by: NURSE PRACTITIONER

## 2020-02-06 PROCEDURE — 11719 TRIM NAIL(S) ANY NUMBER: CPT | Mod: Q9,PBBFAC | Performed by: PODIATRIST

## 2020-02-06 PROCEDURE — 11719 TRIM NAIL(S) ANY NUMBER: CPT | Mod: Q9,S$PBB,, | Performed by: PODIATRIST

## 2020-02-06 PROCEDURE — 2024F 7 FLD RTA PHOTO EVC RTNOPTHY: CPT | Mod: ,,, | Performed by: PODIATRIST

## 2020-02-06 PROCEDURE — 80053 COMPREHEN METABOLIC PANEL: CPT

## 2020-02-06 PROCEDURE — 2024F PR 7 FIELD PHOTOS WITH INTERP/ REVIEW: ICD-10-PCS | Mod: ,,, | Performed by: PODIATRIST

## 2020-02-06 PROCEDURE — 95250 CONT GLUC MNTR PHYS/QHP EQP: CPT | Mod: PBBFAC | Performed by: NURSE PRACTITIONER

## 2020-02-06 PROCEDURE — 82962 GLUCOSE BLOOD TEST: CPT | Mod: PBBFAC | Performed by: NURSE PRACTITIONER

## 2020-02-06 PROCEDURE — 67028 INJECTION EYE DRUG: CPT | Mod: PBBFAC,RT | Performed by: OPHTHALMOLOGY

## 2020-02-06 PROCEDURE — 11719 PR TRIM NAIL(S): ICD-10-PCS | Mod: Q9,S$PBB,, | Performed by: PODIATRIST

## 2020-02-06 PROCEDURE — 36415 COLL VENOUS BLD VENIPUNCTURE: CPT

## 2020-02-06 PROCEDURE — 92134 POSTERIOR SEGMENT OCT RETINA (OCULAR COHERENCE TOMOGRAPHY)-BOTH EYES: ICD-10-PCS | Mod: 26,S$PBB,, | Performed by: OPHTHALMOLOGY

## 2020-02-06 PROCEDURE — 99214 OFFICE O/P EST MOD 30 MIN: CPT | Mod: S$PBB,,, | Performed by: NURSE PRACTITIONER

## 2020-02-06 PROCEDURE — 67028 PR INJECT INTRAVITREAL PHARMCOLOGIC: ICD-10-PCS | Mod: S$PBB,RT,, | Performed by: OPHTHALMOLOGY

## 2020-02-06 PROCEDURE — 83036 HEMOGLOBIN GLYCOSYLATED A1C: CPT

## 2020-02-06 PROCEDURE — 67028 INJECTION EYE DRUG: CPT | Mod: S$PBB,RT,, | Performed by: OPHTHALMOLOGY

## 2020-02-06 PROCEDURE — 99214 PR OFFICE/OUTPT VISIT, EST, LEVL IV, 30-39 MIN: ICD-10-PCS | Mod: S$PBB,,, | Performed by: NURSE PRACTITIONER

## 2020-02-06 PROCEDURE — 92134 CPTRZ OPH DX IMG PST SGM RTA: CPT | Mod: PBBFAC | Performed by: OPHTHALMOLOGY

## 2020-02-06 PROCEDURE — 11720 PR DEBRIDEMENT OF NAIL(S), 1-5: ICD-10-PCS | Mod: 59,Q9,S$PBB, | Performed by: PODIATRIST

## 2020-02-06 PROCEDURE — 99999 PR PBB SHADOW E&M-EST. PATIENT-LVL III: ICD-10-PCS | Mod: PBBFAC,,, | Performed by: NURSE PRACTITIONER

## 2020-02-06 PROCEDURE — 99214 OFFICE O/P EST MOD 30 MIN: CPT | Mod: PBBFAC,27,25 | Performed by: PODIATRIST

## 2020-02-06 PROCEDURE — 11720 DEBRIDE NAIL 1-5: CPT | Mod: Q9,PBBFAC,59 | Performed by: PODIATRIST

## 2020-02-06 PROCEDURE — 99203 PR OFFICE/OUTPT VISIT, NEW, LEVL III, 30-44 MIN: ICD-10-PCS | Mod: 25,S$PBB,, | Performed by: PODIATRIST

## 2020-02-06 PROCEDURE — 92014 COMPRE OPH EXAM EST PT 1/>: CPT | Mod: 25,S$PBB,, | Performed by: OPHTHALMOLOGY

## 2020-02-06 RX ADMIN — AFLIBERCEPT 2 MG: 40 INJECTION, SOLUTION INTRAVITREAL at 11:02

## 2020-02-06 NOTE — PROGRESS NOTES
Updated care teams. Pt scheduled repeat hag1c on 2/6/2020. No new records found in [facility].  HM reviewed and updated. Immunizations abstracted.  Care Everywhere abstracted. No new results found.  Health Maintenance Due   Topic    Foot Exam     TETANUS VACCINE     Colonoscopy     Eye Exam      Previsit chart audit completed.  *KDL*

## 2020-02-06 NOTE — PROGRESS NOTES
[facility]  PODIATRIC MEDICINE AND SURGERY      CHIEF COMPLAINT  Chief Complaint   Patient presents with    Diabetic Foot Exam     PCP Dr. Hermelindo Ash 10/3/19 [facility]          HPI    SUBJECTIVE: Mono Bergman is a 74 y.o. male who  has a past medical history of Arthritis, Cataract, Colon polyps (10/29/13), Diabetes mellitus type II, Diabetic retinopathy, GERD (gastroesophageal reflux disease) (7/18/2013), Hyperlipidemia LDL goal < 70 (7/18/2013), Hypertension, JOEL on CPAP, and Uveitis (5/11/2016). Monopresents to clinic for high risk diabetic foot exam and care.  Mono admits numbness, burning, and/or tingling sensations in their feet. Patient relates blood sugars normally run around 160. Pt has established care with primary care physician and would like to establish care with a podiatric physician. He does wear custom DM shoes and inserts. He complains about painful elongated toenails. Pain is worse in enclosed shoe wear and ambulation. he requests treatment. Patient has no other pedal complaints at this time      HgA1c:   Hemoglobin A1C   Date Value Ref Range Status   10/08/2019 8.5 (H) 4.0 - 5.6 % Final     Comment:     ADA Screening Guidelines:  5.7-6.4%  Consistent with prediabetes  >or=6.5%  Consistent with diabetes  High levels of fetal hemoglobin interfere with the HbA1C  assay. Heterozygous hemoglobin variants (HbS, HgC, etc)do  not significantly interfere with this assay.   However, presence of multiple variants may affect accuracy.           PMH  Past Medical History:   Diagnosis Date    Arthritis     Cataract     Colon polyps 10/29/13    Diabetes mellitus type II     Diabetic retinopathy     GERD (gastroesophageal reflux disease) 7/18/2013    Hyperlipidemia LDL goal < 70 7/18/2013    Hypertension     Low BP    JOEL on CPAP     Uveitis 5/11/2016       MEDS  Current Outpatient Medications on File Prior to Visit   Medication Sig Dispense Refill    antiox  "#8/om3/dha/epa/lut/zeax (PRESERVISION AREDS 2, OMEGA-3, ORAL) Take 1 tablet by mouth once daily.      ARGININE, L-ARGININE, ORAL Take by mouth.      aspirin 81 mg Tab Take 81 mg by mouth before breakfast.      atorvastatin (LIPITOR) 80 MG tablet Take 40 mg by mouth once daily.      blood-glucose meter,continuous (DEXCOM G6 ) Misc Use per 's instructions 1 each 0    blood-glucose sensor (DEXCOM G6 SENSOR) Calista Use one per 10 days  per 's instructions 1 Device 11    blood-glucose transmitter (DEXCOM G6 TRANSMITTER) Calista Use per 's instructions 1 Device 3    cyanocobalamin (VITAMIN B-12) 1000 MCG tablet Take 100 mcg by mouth once daily.      docusate sodium (COLACE) 100 MG capsule Take 100 mg by mouth 2 (two) times daily.      ibuprofen (ADVIL,MOTRIN) 200 MG tablet Take 400 mg by mouth daily as needed for Pain.      insulin aspart U-100 (NOVOLOG FLEXPEN U-100 INSULIN) 100 unit/mL (3 mL) InPn pen 8 UNITS WITH BREAKFAST 12 UNITS WITH LUNCH 12 UNITS WITH DINNER 15 mL 0    insulin glargine (LANTUS) 100 unit/mL injection Inject 50 Units into the skin once daily.      insulin needles, disposable, (SURE-FINE PEN NEEDLES) 31 x 3/16 " Ndle Use twice daily to inject insulin 100 each 3    L.acidophilus-B.bifidum&longum 150 mg (3 billion cell) Chew Take 1 tablet by mouth.      metoclopramide HCl (REGLAN) 10 MG tablet Take 10 mg by mouth once daily.      midodrine (PROAMATINE) 5 MG Tab       pen needle, diabetic (BD INSULIN PEN NEEDLE UF MINI) 31 gauge x 3/16" Ndle       polyethylene glycol (GLYCOLAX) 17 gram/dose powder polyethylene glycol 3350 17 gram/dose oral powder      promethazine (PHENERGAN) 12.5 MG Tab Take 12.5 mg by mouth.      propranolol HCl (PROPRANOLOL ORAL) Take 20 mg by mouth once daily.      simethicone (MYLICON) 80 MG chewable tablet simethicone 80 mg chewable tablet   Take by oral route.      acetaZOLAMIDE (DIAMOX SEQUELS) 500 mg CpSR Take 1 " "capsule (500 mg total) by mouth 2 (two) times daily. 60 capsule 1     No current facility-administered medications on file prior to visit.        PSH     Past Surgical History:   Procedure Laterality Date    CATARACT EXTRACTION W/  INTRAOCULAR LENS IMPLANT Left 12/10/14    CATARACT EXTRACTION W/  INTRAOCULAR LENS IMPLANT Right 07/13/2016    CHOLECYSTECTOMY      COLONOSCOPY N/A 2/3/2017    Procedure: COLONOSCOPY;  Surgeon: Natan Magdaleno MD;  Location: [facility];  Service: Endoscopy;  Laterality: N/A;    EYE SURGERY      KNEE SURGERY  1971    left knee    rk rt. eye          ALL  Review of patient's allergies indicates:  No Known Allergies    SOC     Social History     Tobacco Use    Smoking status: Never Smoker    Smokeless tobacco: Never Used   Substance Use Topics    Alcohol use: No    Drug use: No         Family HX    Family History   Problem Relation Age of Onset    Heart disease Mother     Colon cancer Neg Hx             REVIEW OF SYSTEMS  General: Denies any fever or chills  Chest: Denies shortness of breath, wheezing, coughing, or sputum production  Heart: Denies chest pain.  As noted above and per history of current illness above, otherwise negative in the remainder of the 14 systems.     PHYSICAL EXAM  Vitals:    02/06/20 1319   BP: (!) 91/56   Pulse: 72   Weight: 96.3 kg (212 lb 4.9 oz)   Height: 5' 8" (1.727 m)       GEN:  This patient is well-developed, well-nourished and appears stated age, well-oriented to person, place and time, and cooperative and pleasant on today's visit.      LOWER EXTREMITY PHYSICAL EXAMINATION   VASCULAR  DP pedal pulse 2/4 RIGHT, LEFT2/4   PT pedal pulse 2/4 RIGHT, LEFT2/4  Capillary refill time immediate to the toes.   Feet are warm to the touch. Skin temperature warm to warm from proximally to distally   There are no varicosities, telangiectasias noted to bilateral foot and ankle regions.   There are n oecchymoses noted to bilateral foot and ankle regions. "   There is no gross lower extremity edema.    DERMATOLOGIC  Skin moist with healthy texture and turgor.  Thickened, dystrophic, elongated toenails with subungal debris 1 right foot, remaining nails are elongated but not mycotic  There are no open ulcerations, lacerations, or fissures to bilateral foot and ankle regions. There are no signs of infection as there is no erythema, no proximal-extending lymphangiitis, no fluctuance, or crepitus noted on palpation to bilateral foot and ankle regions.  Interim I am about activities  There is no interdigital maceration.   There are hyperkeratotic lesions noted to feet.     NEUROLOGIC  Protective sensation intact at 0/10 sites upon examination with [place] Weinsten 5.07 g monofilament.  Propioception intact at 1st MTPJ b/l.  Babinski reflex absent    ORTHOPEDIC/BIOMECHANICAL  No symptomatic structural abnormalities noted  Muscle strength AT/EHL/EDL/PT: 5/5; Achilles/Gastroc/Soleus: 5/5; PB/PL: 5/5 Muscle tone is normal.  Ankle joint ROM limited DF/PF, non-crepitus  Limb length discrepancy, left shorter than right    ASSESSMENT  Encounter Diagnoses   Name Primary?    Uncontrolled type 2 diabetes mellitus with hyperglycemia Yes    DM type 2 with diabetic peripheral neuropathy     Dermatophytosis of nail     Pain due to onychomycosis of toenails of both feet          Plan:  -Initial patient evaluation with H&P was performed  -Discuss presenting problems, etiology, pathologic processes and management options with patient today.   -I counseled the patient on their conditions, their implications and medical management. An in depth discussion on diabetic management, risk prevention, amputation prevention verbally and provided educational literature in written format.    -With patient's permission, the elongated onychomycotic toenails, as outlined in the physical examination, were sharply debrided with a double action nail nipper to their soft tissue attachment. If indicated, the  nails were then smoothed down in thickness with an dave board or dremmel to facilitate in further debridement removing all offending nail and subungual debris.  The elongated nails, as outlined in the objective portion of this note, were trimmed to appropriate length, with a double action nail nipper, for alleviation/reduction of pains as well. Patient relates relief following the procedure.     - Shoe inspection. Diabetic Foot Education. Patient reminded of the importance of good nutrition and blood sugar control to help prevent podiatric complications of diabetes. Patient instructed on proper foot hygeine. We discussed wearing proper shoe gear, daily foot inspections, never walking without protective shoe gear, never putting sharp instruments to feet, routine podiatric visits annually/semi annually or sooner if symptoms arise    Disclaimer: This note was partially prepared using a voice recognition system and is likely to have sound alike errors within the text.        Future Appointments   Date Time Provider Department Center   2/11/2020 11:20 AM [facility]   2/13/2020  2:00 PM DIABETES MANAG[facility] [place]   3/5/2020  1:30 PM J. Finesse Creed, MD [facility] [place]   3/24/2020 12:30 PM Catalina Hagan PA-C [facility] [place]       Report Electronically Signed By:     Shari Lynn DPM   Podiatry  [facility]  2/6/2020

## 2020-02-06 NOTE — PATIENT INSTRUCTIONS
TAKE LANTUS 50 UNITS EVERY NIGHT. NO SKIPPING  NOVOLOG :   8 UNITS WITH BREAKFAST  12 UNITS WITH LUNCH  12 UNITS WITH DINNER    CONTINUOUS GLUCOSE MONITOR X 7 DAYS      IF YOU ARE HAPPY WITH YOUR VISIT TODAY, PLEASE FILL OUT THE SURVEY THAT MAY BE SENT TO YOUR EMAIL ADDRESS OR MAILBOX FOLLOWING THIS VISIT. WE LOVE TO HEAR YOUR COMMENTS! HAVE A WONDERFUL DAY!

## 2020-02-06 NOTE — PROGRESS NOTES
===============================  Mono Bergman,  2/6/2020 today   74 y.o. male   Last visit [facility]: :12/5/2019   Last visit eye dept. 12/5/2019  [facility]:  Uncorrected distance visual acuity was 20/200 in the right eye and 20/200 in the left eye.  Tonometry     Tonometry (Applanation, 11:46 AM)       Right Left    Pressure 15 22               Not recorded         Not recorded         Not recorded        Chief Complaint   Patient presents with    PDR     EYLEA OS       ________________  2/6/2020 today  HPI     PDR      Additional comments: EYLEA OS              Comments     1.) + DM SINCE 1997  2.) + PDR OU/ CSME OS  Bilateral PRP  PPV OS with Dr. 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 TEARS BID OS  REFRESH GEL BID OS   OMEGA E BID P.O.    Eylea od 8/15/19  Eylea os 12/5/19          Last edited by VALERIANO Tucker MD on 2/6/2020 11:49 AM. (History)      Problem List Items Addressed This Visit        Eye/Vision problems    Type 2 diabetes mellitus with both eyes affected by proliferative retinopathy and macular edema, with long-term current use of insulin - Primary    Relevant Medications    aflibercept Soln 2 mg (Completed)    Other Relevant Orders    Posterior Segment OCT Retina-Both eyes    Prior Authorization Order        Lost to follow up  eylea OS in Dec.  OS TRD  . micron persistent dme  erm OD  OD new hemorrhagic microcyst  So today OD injection - eylea to plateau  Discussed importance of timely appointments to get this under control  Need monthly injection      2/6/2020  Diagnosis :  dme  Today:   Eylea (afibercept) 2 mg/0.05 ml Intravitreal Injection , OD   Follow up: 1 month        Instructed to call 24/7 for any worsening of vision. Check Both eyes daily. Gave patient my home phone number.  Risks, benefits, and alternatives to  treatment discussed in detail with the patient.  The patient voiced understanding and wished to proceed with the procedure     Injection Procedure Note:       Patient Identified and Time Out complete  Subconjunctival bleb - xylocaine with epi 2%   and Betadine.  Inject eylea od at 6:00 @ 3.5-4mm posterior to limbus  Post Operative Dx: Same  Complications: None  Follow up as above.      ===========================

## 2020-02-06 NOTE — PROGRESS NOTES
"Subjective:         Patient ID: Mono Bergman is a 74 y.o. male.  Patient's current PCP is Hermelindo Ash MD.     Chief Complaint: Diabetes Mellitus    HPI  Mono Bergman is a 74 y.o. White male presenting for follow up for Diabetes. Patient has been diagnosed with diabetes for many yearsand has the following complications associated with diabetes: gastroparesis, CKD,  retinopathy.  On MDI regimen.   Patient is partially compliant. He reports he doesn't take his Lantus dose if glucoses are not in the 200s in the evening. He takes Novolog with each meal as instructed.     Patient reports that he has self adjusted a bit and is taking 55 units once daily of Lantus, and Humalog 12 units with meals. He reports that glucoses are controlled. He is less than 130 fasting, less than 180 PP.     Dexcom Application Pending.     He denies any recent hospital admissions or emergency room visits related to Diabetes Mellitus.      Height: 5' 8" (172.7 cm)  //  Weight: 96.1 kg (211 lb 13.8 oz), Body mass index is 32.21 kg/m².  His blood sugar in clinic today is:   Lab Results   Component Value Date    POCGLU 185 (A) 01/07/2020       Labs reviewed and are noted below.    His most recent A1C is:   Lab Results   Component Value Date    HGBA1C 8.5 (H) 10/08/2019     No results found for: CPEPTIDE  No results found for: GLUTAMICACID  No components found for: LDL    CURRENT DM MEDICATIONS:   Current Outpatient Medications   Medication Sig Dispense Refill    insulin glargine (LANTUS) 100 unit/mL injection          Novolog 8 units with breakfast, 12 units with lunch Inject 55 Units into the skin once daily.  Taking 50.        Taking 12 units with each meal       No current facility-administered medications for this visit.        Health Maintenance   Topic Date Due    TETANUS VACCINE  08/17/1965    Colonoscopy  02/03/2018    Hemoglobin A1c  04/08/2020    PROSTATE-SPECIFIC ANTIGEN  10/03/2020    Lipid Panel  " 10/08/2020    Urine Microalbumin  01/07/2021    Low Dose Statin  02/06/2021    Foot Exam  02/06/2021    Eye Exam  02/06/2021    Hepatitis C Screening  Completed    Pneumococcal Vaccine (65+ Low/Medium Risk)  Completed       STANDARDS OF CARE:  Current Ophthalmologist/Optometrist: Dr. Tucker.   Current Podiatrist: Dr. Montaño  ACE/ARB: Yes  Statin: Yes  He  has attended diabetes education in the past.     LIFESTYLE:    BLOOD GLUCOSE TESTING: Patient reports testing 4 times per day.       Review of Systems   Constitutional: Negative.  Negative for activity change, appetite change, fatigue and unexpected weight change.   Eyes: Negative for visual disturbance.   Gastrointestinal: Negative for constipation, diarrhea and nausea.   Endocrine: Negative.  Negative for cold intolerance, heat intolerance, polydipsia, polyphagia and polyuria.   Skin: Negative for wound.         Objective:      Physical Exam   Constitutional: He appears well-developed and well-nourished.   HENT:   Head: Normocephalic and atraumatic.   Cardiovascular: Normal rate.   Pulmonary/Chest: Effort normal.   Skin: Skin is warm and dry.   Psychiatric: He has a normal mood and affect. His speech is normal and behavior is normal. Judgment and thought content normal.   Nursing note and vitals reviewed.      Assessment:       1. Type 2 diabetes mellitus with hemoglobin A1c goal of less than 7.0%    2. Hyperlipidemia associated with type 2 diabetes mellitus    3. Hypertension associated with diabetes        Plan:   Type 2 diabetes mellitus with hemoglobin A1c goal of less than 7.0%  -     GLUCOSE MONITORING CONTINUOUS MIN 72 HOURS; Future  -     POCT Glucose, Hand-Held Device    - Condition unknowns as compliance is not great. Patient instructed to take each medication as prescribed. CGMS x 7 days to assess. Dexcom Pending. DM education reviewed. Patient encouraged to carb count and exercise per recommendations. Labs and Referrals as noted. Patient  instructed to send in log weekly for review and potential medication adjustments. NV scheduled in 1 weeks.     Hyperlipidemia associated with type 2 diabetes mellitus    - LDL at goal    Hypertension associated with diabetes    - Stable      Additional Details:    1. Patient was instructed to monitor blood glucose 4 x daily, fasting and ac dinner or at bedtime. Discussed ADA goals for blood glucose levels and hypoglycemic protocol.  Reminded to bring BG records or meter to each visit for review.    2. The patient was explained the above plan and given opportunity to ask questions.  He understands, chooses and consents to this plan and accepts all the risks, which include but are not limited to the risks mentioned above. He understands the alternative of having no testing, interventions or treatments at this time. He left content and without further questions.     A total of 30 minutes was spent in face to face time, of which over 50% was spent in counseling patient on disease process, complications, treatment, and side effects of medications.        LUCY Martinez

## 2020-02-06 NOTE — LETTER
February 6, 2020      [place]  [address] [place] [address]           [facility]  [address]  [place] [address]  Phone: [phone]  Fax: [phone]          Patient: Mono Bergman   MR Number: [id number]   YOB: 1945   Date of Visit: 2/6/2020       Dear Usha Ortega:    Thank you for referring Mono Bergman to me for evaluation. Attached you will find relevant portions of my assessment and plan of care.    If you have questions, please do not hesitate to call me. I look forward to following Mono Bergman along with you.    Sincerely,    Shari Lynn, SUSHMA    Enclosure  CC:  No Recipients    If you would like to receive this communication electronically, please contact [email or web address] or [phone] to request more information on [employer] access.    For providers and/or their staff who would like to refer a patient to [facility], please contact us through our one-stop-shop provider referral line, Ana Guillen, at [phone].    If you feel you have received this communication in error or would no longer like to receive these types of communications, please e-mail [email or web address]

## 2020-02-11 ENCOUNTER — OFFICE VISIT (OUTPATIENT)
Dept: INTERNAL MEDICINE | Facility: CLINIC | Age: 75
End: 2020-02-11
Payer: MEDICARE

## 2020-02-11 VITALS
WEIGHT: 195.13 LBS | BODY MASS INDEX: 29.57 KG/M2 | DIASTOLIC BLOOD PRESSURE: 60 MMHG | TEMPERATURE: 99 F | SYSTOLIC BLOOD PRESSURE: 110 MMHG | HEART RATE: 78 BPM | HEIGHT: 68 IN

## 2020-02-11 DIAGNOSIS — N18.30 STAGE 3 CHRONIC KIDNEY DISEASE: ICD-10-CM

## 2020-02-11 DIAGNOSIS — E11.59 HYPERTENSION ASSOCIATED WITH DIABETES: ICD-10-CM

## 2020-02-11 DIAGNOSIS — E78.5 HYPERLIPIDEMIA ASSOCIATED WITH TYPE 2 DIABETES MELLITUS: ICD-10-CM

## 2020-02-11 DIAGNOSIS — E11.65 UNCONTROLLED TYPE 2 DIABETES MELLITUS WITH HYPERGLYCEMIA: Primary | ICD-10-CM

## 2020-02-11 DIAGNOSIS — I15.2 HYPERTENSION ASSOCIATED WITH DIABETES: ICD-10-CM

## 2020-02-11 DIAGNOSIS — E11.69 HYPERLIPIDEMIA ASSOCIATED WITH TYPE 2 DIABETES MELLITUS: ICD-10-CM

## 2020-02-11 PROCEDURE — 99214 OFFICE O/P EST MOD 30 MIN: CPT | Mod: S$PBB,,, | Performed by: FAMILY MEDICINE

## 2020-02-11 PROCEDURE — 99999 PR PBB SHADOW E&M-EST. PATIENT-LVL III: CPT | Mod: PBBFAC,,, | Performed by: FAMILY MEDICINE

## 2020-02-11 PROCEDURE — 99999 PR PBB SHADOW E&M-EST. PATIENT-LVL III: ICD-10-PCS | Mod: PBBFAC,,, | Performed by: FAMILY MEDICINE

## 2020-02-11 PROCEDURE — 99213 OFFICE O/P EST LOW 20 MIN: CPT | Mod: PBBFAC,PO | Performed by: FAMILY MEDICINE

## 2020-02-11 PROCEDURE — 99214 PR OFFICE/OUTPT VISIT, EST, LEVL IV, 30-39 MIN: ICD-10-PCS | Mod: S$PBB,,, | Performed by: FAMILY MEDICINE

## 2020-02-11 NOTE — PROGRESS NOTES
"Subjective:      Patient ID: Mono Bergman is a 74 y.o. male.    Chief Complaint:  F/u chronic conditions    HPI  73 yo [facility].  Doing well on meds, taking them daily.  Followed by DM team as well.  Feeling well.  Deals with low BP//not on ACE/ARB.  Using CPAP  Eating same diet daily//ham and cheese sandwich from [employer] with chips and water.  Denies CP/SOB  No falls    Past Medical History:   Diagnosis Date    Arthritis     Cataract     Colon polyps 10/29/13    Diabetes mellitus type II     Diabetic retinopathy     GERD (gastroesophageal reflux disease) 7/18/2013    Hyperlipidemia LDL goal < 70 7/18/2013    Hypertension     Low BP    JOEL on CPAP     Uveitis 5/11/2016     Family History   Problem Relation Age of Onset    Heart disease Mother     Colon cancer Neg Hx      Past Surgical History:   Procedure Laterality Date    CATARACT EXTRACTION W/  INTRAOCULAR LENS IMPLANT Left 12/10/14    CATARACT EXTRACTION W/  INTRAOCULAR LENS IMPLANT Right 07/13/2016    CHOLECYSTECTOMY      COLONOSCOPY N/A 2/3/2017    Procedure: COLONOSCOPY;  Surgeon: Natan Magdaleno MD;  Location: [facility];  Service: Endoscopy;  Laterality: N/A;    EYE SURGERY      KNEE SURGERY  1971    left knee    rk rt. eye       Social History     Tobacco Use    Smoking status: Never Smoker    Smokeless tobacco: Never Used   Substance Use Topics    Alcohol use: No    Drug use: No       /60   Pulse 78   Temp 98.5 °F (36.9 °C) (Oral)   Ht 5' 8" (1.727 m)   Wt 88.5 kg (195 lb 1.7 oz)   BMI 29.67 kg/m²     Review of Systems   Constitutional: Negative for activity change, appetite change, chills, diaphoresis, fatigue, fever and unexpected weight change.   HENT: Negative for hearing loss and tinnitus.    Respiratory: Negative for cough, chest tightness, shortness of breath and wheezing.    Cardiovascular: Negative for chest pain, palpitations and leg swelling. "   Gastrointestinal: Negative for abdominal distention, abdominal pain, constipation and diarrhea.   Genitourinary: Negative for difficulty urinating.   Musculoskeletal: Positive for arthralgias. Negative for back pain.   Neurological: Negative for dizziness, weakness and headaches.       Objective:     Physical Exam   Constitutional: He is oriented to person, place, and time. He appears well-developed and well-nourished. No distress.   HENT:   Nose: Nose normal.   Mouth/Throat: Oropharynx is clear and moist.   Eyes: Pupils are equal, round, and reactive to light. Conjunctivae are normal.   Neck: Normal range of motion. Neck supple.   Cardiovascular: Normal rate, regular rhythm and normal heart sounds.   Pulmonary/Chest: Effort normal and breath sounds normal. No respiratory distress. He has no wheezes.   Abdominal: Soft. Bowel sounds are normal. He exhibits no distension. There is no tenderness. There is no guarding.   Musculoskeletal: He exhibits no edema.   Neurological: He is alert and oriented to person, place, and time. No cranial nerve deficit.   Skin: Skin is warm and dry. No rash noted. He is not diaphoretic.   Psychiatric: He has a normal mood and affect. His behavior is normal. Judgment and thought content normal.   Nursing note and vitals reviewed.      Lab Results   Component Value Date    WBC 7.33 10/08/2019    HGB 13.3 (L) 10/08/2019    HCT 41.5 10/08/2019     10/08/2019    CHOL 133 10/08/2019    TRIG 100 10/08/2019    HDL 30 (L) 10/08/2019    ALT 29 02/06/2020    AST 24 02/06/2020     02/06/2020    K 5.3 (H) 02/06/2020     02/06/2020    CREATININE 1.4 02/06/2020    BUN 32 (H) 02/06/2020    CO2 22 (L) 02/06/2020    PSA 2.5 10/03/2019    INR 1.0 05/01/2017    HGBA1C 7.6 (H) 02/06/2020       Assessment:     1. Uncontrolled type 2 diabetes mellitus with hyperglycemia    2. Hyperlipidemia associated with type 2 diabetes mellitus    3. Hypertension associated with diabetes    4. Stage 3  chronic kidney disease         Plan:     Uncontrolled type 2 diabetes mellitus with hyperglycemia  -     Hemoglobin A1c; Future; Expected date: 05/11/2020    Hyperlipidemia associated with type 2 diabetes mellitus    Hypertension associated with diabetes    Stage 3 chronic kidney disease  -     Basic metabolic panel; Future; Expected date: 05/11/2020    BP low side/hold off on ACE/ARB//monitor urine cr/alb  CKD stable//avoid NSAIDs.  Stay well hydrated with water intake  DM improving, cont current regimen and f/u  Lipids stable/cont high dose statin/ASA  Fall precautions  Work on better eating/exercise  F/u with labs in 3 mos

## 2020-02-12 ENCOUNTER — PATIENT OUTREACH (OUTPATIENT)
Dept: ADMINISTRATIVE | Facility: HOSPITAL | Age: 75
End: 2020-02-12

## 2020-02-13 ENCOUNTER — CLINICAL SUPPORT (OUTPATIENT)
Dept: DIABETES | Facility: CLINIC | Age: 75
End: 2020-02-13
Payer: MEDICARE

## 2020-02-13 NOTE — PROGRESS NOTES
Patient came in today to get his two week CGMS removed downloaded and put into his chart and sent over to the provider to review and make adjustments if needed.

## 2020-02-18 PROCEDURE — 95251 PR GLUCOSE MONITOR, 72 HOUR, PHYS INTERP: ICD-10-PCS | Mod: ,,, | Performed by: NURSE PRACTITIONER

## 2020-02-18 PROCEDURE — 95251 CONT GLUC MNTR ANALYSIS I&R: CPT | Mod: ,,, | Performed by: NURSE PRACTITIONER

## 2020-02-21 ENCOUNTER — TELEPHONE (OUTPATIENT)
Dept: DIABETES | Facility: CLINIC | Age: 75
End: 2020-02-21

## 2020-02-21 NOTE — PROGRESS NOTES
Interpretation of CGMS as follows:  Standard Deviation:   Average Blood Glucose: 145  Estimated A1C:   Time in Glucose Target Range:   Time Above Glucose Target Range:   Time Below Glucose Target Range:   Variability Below Median ***      Blood glucose levels are ***. Changes to regimen are ***.     Results discussed with patient.

## 2020-02-21 NOTE — PROGRESS NOTES
Interpretation of CGMS as follows:  Average Blood Glucose: 145  Time in Glucose Target Range: 47%  Time Above Glucose Target Range: 46%  Time Below Glucose Target Range: 7%    Blood glucose levels are improved. Continue current regimen. Follow up with new provider. .

## 2020-02-21 NOTE — TELEPHONE ENCOUNTER
Called and spoke with patient.   Advise to continue plan.   Appointment to [facility] care is scheduled.   ----- Message from Usha Ortega NP sent at 2/21/2020  7:35 AM CST -----  See note

## 2020-02-27 ENCOUNTER — TELEPHONE (OUTPATIENT)
Dept: DIABETES | Facility: CLINIC | Age: 75
End: 2020-02-27

## 2020-02-27 NOTE — TELEPHONE ENCOUNTER
Called and spoke with patient regarding appointment for Dexcom. Advise patient that I will send a message to the provider to see if he can be seen on 3/5 the same day he has his other appointment.    ----- Message from Ximena Mejia sent at 2/27/2020 11:01 AM CST -----  Contact: Ms Sheth- Care [phone]  Would like to consult with the nurse, would like to speak with the nurse concerning patient Dex com came in, Please call back at [phone], Thanks sj

## 2020-03-03 ENCOUNTER — PATIENT OUTREACH (OUTPATIENT)
Dept: ADMINISTRATIVE | Facility: OTHER | Age: 75
End: 2020-03-03

## 2020-03-05 ENCOUNTER — NUTRITION (OUTPATIENT)
Dept: DIABETES | Facility: CLINIC | Age: 75
End: 2020-03-05
Payer: MEDICARE

## 2020-03-05 ENCOUNTER — PROCEDURE VISIT (OUTPATIENT)
Dept: OPHTHALMOLOGY | Facility: CLINIC | Age: 75
End: 2020-03-05
Payer: MEDICARE

## 2020-03-05 DIAGNOSIS — E11.65 UNCONTROLLED TYPE 2 DIABETES MELLITUS WITH HYPERGLYCEMIA: Primary | ICD-10-CM

## 2020-03-05 DIAGNOSIS — E11.3513 TYPE 2 DIABETES MELLITUS WITH BOTH EYES AFFECTED BY PROLIFERATIVE RETINOPATHY AND MACULAR EDEMA, WITH LONG-TERM CURRENT USE OF INSULIN: Primary | ICD-10-CM

## 2020-03-05 DIAGNOSIS — Z79.4 TYPE 2 DIABETES MELLITUS WITH BOTH EYES AFFECTED BY PROLIFERATIVE RETINOPATHY AND MACULAR EDEMA, WITH LONG-TERM CURRENT USE OF INSULIN: Primary | ICD-10-CM

## 2020-03-05 PROCEDURE — 99499 UNLISTED E&M SERVICE: CPT | Mod: S$PBB,,, | Performed by: OPHTHALMOLOGY

## 2020-03-05 PROCEDURE — 92134 CPTRZ OPH DX IMG PST SGM RTA: CPT | Mod: PBBFAC | Performed by: OPHTHALMOLOGY

## 2020-03-05 PROCEDURE — 92134 POSTERIOR SEGMENT OCT RETINA (OCULAR COHERENCE TOMOGRAPHY)-BOTH EYES: ICD-10-PCS | Mod: 26,S$PBB,, | Performed by: OPHTHALMOLOGY

## 2020-03-05 PROCEDURE — 99213 OFFICE O/P EST LOW 20 MIN: CPT | Mod: PBBFAC,25 | Performed by: DIETITIAN, REGISTERED

## 2020-03-05 PROCEDURE — 67028 PR INJECT INTRAVITREAL PHARMCOLOGIC: ICD-10-PCS | Mod: S$PBB,RT,, | Performed by: OPHTHALMOLOGY

## 2020-03-05 PROCEDURE — 99999 PR PBB SHADOW E&M-EST. PATIENT-LVL III: ICD-10-PCS | Mod: PBBFAC,,, | Performed by: DIETITIAN, REGISTERED

## 2020-03-05 PROCEDURE — 99999 PR PBB SHADOW E&M-EST. PATIENT-LVL III: CPT | Mod: PBBFAC,,, | Performed by: DIETITIAN, REGISTERED

## 2020-03-05 PROCEDURE — 67028 INJECTION EYE DRUG: CPT | Mod: PBBFAC,RT | Performed by: OPHTHALMOLOGY

## 2020-03-05 PROCEDURE — 99499 NO LOS: ICD-10-PCS | Mod: S$PBB,,, | Performed by: OPHTHALMOLOGY

## 2020-03-05 PROCEDURE — 67028 INJECTION EYE DRUG: CPT | Mod: S$PBB,RT,, | Performed by: OPHTHALMOLOGY

## 2020-03-05 PROCEDURE — 95249 CONT GLUC MNTR PT PROV EQP: CPT | Mod: PBBFAC | Performed by: DIETITIAN, REGISTERED

## 2020-03-05 RX ADMIN — AFLIBERCEPT 2 MG: 40 INJECTION, SOLUTION INTRAVITREAL at 01:03

## 2020-03-05 NOTE — LETTER
March 5, 2020        No Recipients             [facility]  [address]  [place] [address]  Phone: [phone]  Fax: [phone]   Patient: Mono Bergman   MR Number: [id number]   YOB: 1945   Date of Visit: 3/5/2020     Dear  No Recipients,     {WILDCARD:[id number]}    Sincerely,      Marie Noble, TAMIA, CDE            CC  No Recipients    Enclosure

## 2020-03-05 NOTE — PROGRESS NOTES
"DIABETES EDUCATION  DEXCOM G6 CGM TRAINING    [facility] Moses    Patient referred to clinic today for Dexcom G6 continuous glucose sensor system training.   Pt's nurse who will be assisting w/ Dexcom application and use is with him today.   Patient arrived with  fully charged. Education was provided using "Quick Start Guide" per Dexcom protocol.  § Overview:  5min glucose reading updates, trending arrows, BG graph screens, battery life indicator, Blue Tooth Symbol.  § Menus: trend Graph, start sensor, enter BG, events, Alerts, Settings, Shutdown, Stop Sensor  §  Settings:                          * Urgent Low: 55 mg/dL                          * Urgent Low Soon: on                          * Low alert: 80                          * High alert: 200                          * Rise rate: off                          * Fall Rate: on                          * Signal Loss: on                          * No Reading: on                          * Always sound/volume:normal   · Transmitter was paired with .    · Reviewed where to find sensor insertion time and sensor expiration date.   · Discussed no need to calibrate sensor during the entirety of the 10 day wear. Reviewed appropriate calibration techniques.  · Reviewed sensor site selection. Pt's nurse selected and prepped site using aseptic technique, inserted sensor, secured transmitter into sensor pod and started sensor session.  · Practiced sensor pod/transmitter removal from site, and removal of transmitter from sensor pod.  · Patient and nurse able to demonstrate without difficulty.  Encouraged to review manual prior to starting another sensor.   · Reviewed problem solving aspects of sensor transmission/ variables that can disrupt RF transmission.  range 20 feet, but the first 3 hrs keep within 3 feet of transmitter.  · Pt instructed on lag time of interstitial fluid from CBG and was advised to tx hypoglycemia and dose " insulin based on SMBG values.  · Dexcom technical 24hr support contact number given and examples of when to contact them discussed. Pt will download software at home for Dexcom download.     Will complete diabetes distress and diabetes care assessment next visit due time needed for Dexcom start.    Time spent with patient: 60 minutes

## 2020-03-05 NOTE — LETTER
March 5, 2020        Catalina Hagan PA-C  [address] [place] [address]             [facility]  [address] [place] [address]  Phone: [phone]  Fax: [phone]   Patient: Mono Bergman   MR Number: [id number]   YOB: 1945   Date of Visit: 3/5/2020       Dear Dr. Hagan:    Thank you for referring Mono Bergman to me for Dexcom G6 CGMS training. Below are the relevant portions of my assessment and plan of care.     If you have questions, please do not hesitate to call me. I look forward to following Mono along with you.    Sincerely,      Marie Noble RD, CDE           CC  Hermelindo MD Evan Singh MD

## 2020-03-05 NOTE — PROGRESS NOTES
"    ===============================  Mono Bergman,  3/5/2020 today   74 y.o. male   Last visit [facility]: :2/6/2020   Last visit eye dept. 2/6/2020  [facility]:  Uncorrected distance visual acuity was 20/60 in the right eye and 20/300 in the left eye.   Not recorded         Not recorded         Not recorded         Not recorded        Chief Complaint   Patient presents with    PDR     EYLEA OD       ________________  3/5/2020 today  HPI     PDR      Additional comments: EYLEA OD              Comments     1.) + DM SINCE 1997  2.) + PDR OU/ CSME OS  Bilateral PRP  PPV OS with Dr. 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 TEARS BID OS  REFRESH GEL BID OS   OMEGA E BID P.O.    Eylea od 2/6/20  Eylea os 12/5/19          Last edited by Laya Carvalho on 3/5/2020 12:56 PM. (History)      Problem List Items Addressed This Visit     None        od dme opuch better after avstin "new#1 " last visto     3/5/2020  Diagnosis :  od  dme  Today:   Eylea (afibercept) 2 mg/0.05 ml Intravitreal Injection , OD   Follow up: rct 1mo      Instructed to call 24/7 for any worsening of vision. Check Both eyes daily. Gave patient my home phone number.  Risks, benefits, and alternatives to treatment discussed in detail with the patient.  The patient voiced understanding and wished to proceed with the procedure     Injection Procedure Note:     Patient Identified and Time Out complete  Subconjunctival bleb - xylocaine with epi 2%   and Betadine.  Inject eylea at 6:00 @ 3.5-4mm posterior to limbus  Post Operative Dx: Same  Complications: None  Follow up as above.    .      ===========================    "

## 2020-05-04 ENCOUNTER — PATIENT OUTREACH (OUTPATIENT)
Dept: ADMINISTRATIVE | Facility: OTHER | Age: 75
End: 2020-05-04

## 2020-05-05 ENCOUNTER — TELEPHONE (OUTPATIENT)
Dept: DIABETES | Facility: CLINIC | Age: 75
End: 2020-05-05

## 2020-05-05 ENCOUNTER — CLINICAL SUPPORT (OUTPATIENT)
Dept: DIABETES | Facility: CLINIC | Age: 75
End: 2020-05-05
Payer: MEDICARE

## 2020-05-05 PROCEDURE — 99999 PR PBB SHADOW E&M-EST. PATIENT-LVL I: CPT | Mod: PBBFAC,,, | Performed by: DIETITIAN, REGISTERED

## 2020-05-05 PROCEDURE — 99999 PR PBB SHADOW E&M-EST. PATIENT-LVL I: ICD-10-PCS | Mod: PBBFAC,,, | Performed by: DIETITIAN, REGISTERED

## 2020-05-05 PROCEDURE — 99211 OFF/OP EST MAY X REQ PHY/QHP: CPT | Mod: PBBFAC | Performed by: DIETITIAN, REGISTERED

## 2020-05-05 NOTE — TELEPHONE ENCOUNTER
Pt requested our appt today be rescheduled because his nurse was not able to join to assist visit. He asked that we mail appts.

## 2020-05-05 NOTE — PROGRESS NOTES
Pt deferred appt because his nurse was not available as he expected. He requested appt be RS and letter mailed.

## 2020-05-12 ENCOUNTER — TELEPHONE (OUTPATIENT)
Dept: INTERNAL MEDICINE | Facility: CLINIC | Age: 75
End: 2020-05-12

## 2020-05-12 NOTE — TELEPHONE ENCOUNTER
Spoke with Meryl, instructed her on how to set up the pt's portal and request proxy access.  She will sign pt up and send message once it's complete to schedule a virtual visit.

## 2020-05-12 NOTE — TELEPHONE ENCOUNTER
----- Message from Cheryl Raman sent at 5/12/2020 12:38 PM CDT -----  Contact: Caregiver-Meryl Sheth is requesting call back in regards to rescheduling upcoming appt as virtual visit and has questions about becoming proxy on pt's portal        Pls call back at [phone]

## 2020-05-13 ENCOUNTER — PROCEDURE VISIT (OUTPATIENT)
Dept: OPHTHALMOLOGY | Facility: CLINIC | Age: 75
End: 2020-05-13
Payer: MEDICARE

## 2020-05-13 ENCOUNTER — LAB VISIT (OUTPATIENT)
Dept: LAB | Facility: HOSPITAL | Age: 75
End: 2020-05-13
Attending: FAMILY MEDICINE
Payer: MEDICARE

## 2020-05-13 DIAGNOSIS — N18.30 STAGE 3 CHRONIC KIDNEY DISEASE: ICD-10-CM

## 2020-05-13 DIAGNOSIS — Z79.4 TYPE 2 DIABETES MELLITUS WITH BOTH EYES AFFECTED BY PROLIFERATIVE RETINOPATHY AND MACULAR EDEMA, WITH LONG-TERM CURRENT USE OF INSULIN: Primary | ICD-10-CM

## 2020-05-13 DIAGNOSIS — E11.3513 TYPE 2 DIABETES MELLITUS WITH BOTH EYES AFFECTED BY PROLIFERATIVE RETINOPATHY AND MACULAR EDEMA, WITH LONG-TERM CURRENT USE OF INSULIN: Primary | ICD-10-CM

## 2020-05-13 DIAGNOSIS — E11.65 UNCONTROLLED TYPE 2 DIABETES MELLITUS WITH HYPERGLYCEMIA: ICD-10-CM

## 2020-05-13 LAB
ANION GAP SERPL CALC-SCNC: 7 MMOL/L (ref 8–16)
BUN SERPL-MCNC: 38 MG/DL (ref 8–23)
CALCIUM SERPL-MCNC: 9.8 MG/DL (ref 8.7–10.5)
CHLORIDE SERPL-SCNC: 103 MMOL/L (ref 95–110)
CO2 SERPL-SCNC: 30 MMOL/L (ref 23–29)
CREAT SERPL-MCNC: 1.6 MG/DL (ref 0.5–1.4)
EST. GFR  (AFRICAN AMERICAN): 49 ML/MIN/1.73 M^2
EST. GFR  (NON AFRICAN AMERICAN): 42.4 ML/MIN/1.73 M^2
ESTIMATED AVG GLUCOSE: 131 MG/DL (ref 68–131)
GLUCOSE SERPL-MCNC: 162 MG/DL (ref 70–110)
HBA1C MFR BLD HPLC: 6.2 % (ref 4–5.6)
POTASSIUM SERPL-SCNC: 4.3 MMOL/L (ref 3.5–5.1)
SODIUM SERPL-SCNC: 140 MMOL/L (ref 136–145)

## 2020-05-13 PROCEDURE — 80048 BASIC METABOLIC PNL TOTAL CA: CPT

## 2020-05-13 PROCEDURE — 92134 CPTRZ OPH DX IMG PST SGM RTA: CPT | Mod: PBBFAC | Performed by: OPHTHALMOLOGY

## 2020-05-13 PROCEDURE — 36415 COLL VENOUS BLD VENIPUNCTURE: CPT

## 2020-05-13 PROCEDURE — 92012 PR EYE EXAM, EST PATIENT,INTERMED: ICD-10-PCS | Mod: 25,S$PBB,, | Performed by: OPHTHALMOLOGY

## 2020-05-13 PROCEDURE — 67028 INJECTION EYE DRUG: CPT | Mod: S$PBB,RT,, | Performed by: OPHTHALMOLOGY

## 2020-05-13 PROCEDURE — 83036 HEMOGLOBIN GLYCOSYLATED A1C: CPT

## 2020-05-13 PROCEDURE — 92012 INTRM OPH EXAM EST PATIENT: CPT | Mod: 25,S$PBB,, | Performed by: OPHTHALMOLOGY

## 2020-05-13 PROCEDURE — 92134 POSTERIOR SEGMENT OCT RETINA (OCULAR COHERENCE TOMOGRAPHY)-BOTH EYES: ICD-10-PCS | Mod: 26,S$PBB,, | Performed by: OPHTHALMOLOGY

## 2020-05-13 PROCEDURE — 67028 PR INJECT INTRAVITREAL PHARMCOLOGIC: ICD-10-PCS | Mod: S$PBB,RT,, | Performed by: OPHTHALMOLOGY

## 2020-05-13 PROCEDURE — 67028 INJECTION EYE DRUG: CPT | Mod: PBBFAC,LT,RT | Performed by: OPHTHALMOLOGY

## 2020-05-13 RX ADMIN — AFLIBERCEPT 2 MG: 40 INJECTION, SOLUTION INTRAVITREAL at 11:05

## 2020-05-13 NOTE — PROGRESS NOTES
"    ===============================  Mono Bergman,  5/13/2020 today   74 y.o. male   Last visit [facility]: :3/5/2020   Last visit eye dept. 3/5/2020  [facility]:  Visual acuity was not recorded.   Not recorded         Not recorded         Not recorded         Not recorded        Chief Complaint   Patient presents with    dme     pt states od va is much worse x 10 days       ________________  5/13/2020 today  HPI     dme      Additional comments: pt states od va is much worse x 10 days              Comments     1.) + DM SINCE 1997  2.) + PDR OU/ CSME OS  Bilateral PRP  PPV OS with Dr. 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 TEARS BID OS  REFRESH GEL BID OS   OMEGA E BID P.O.    Eylea od last 3/5/20  Eylea os 12/5/19          Last edited by GONZALO Keith on 5/13/2020 10:31 AM. (History)      Problem List Items Addressed This Visit        Eye/Vision problems    Type 2 diabetes mellitus with both eyes affected by proliferative retinopathy and macular edema, with long-term current use of insulin - Primary    Relevant Medications    aflibercept Soln 2 mg (Start on 5/13/2020 11:30 AM)    Other Relevant Orders    Posterior Segment OCT Retina-Both eyes (Completed)    Prior authorization Order        "low bp - nabil been taking salt fo rthat "  \  Note snet o internist    Much worse macular edema today, likely secondary to volume overload  Discussed with Mr. Bergman  "I feel like I need salt"  I recommend follow up soon with PCP  Eylea OD today      5/13/2020  Diagnosis :  od dme- worse worse     Today:   Eylea (afibercept) 2 mg/0.05 ml Intravitreal Injection , OD   Follow up: rtc 1 mo -do    Instructed to call 24/7 for any worsening of vision. Check Both eyes daily. Gave patient my home phone number.  Risks, benefits, and alternatives to treatment discussed in " detail with the patient.  The patient voiced understanding and wished to proceed with the procedure     Injection Procedure Note:     Patient Identified and Time Out complete  Subconjunctival bleb - xylocaine with epi 2%   and Betadine.  Inject eylea od at 6:00 @ 3.5-4mm posterior to limbus  Post Operative Dx: Same  Complications: None  Follow up as above.    .      ===========================

## 2020-05-13 NOTE — PROGRESS NOTES
HPI     dme      Additional comments: pt states od va is much worse x 10 days              Comments     1.) + DM SINCE 1997  2.) + PDR OU/ CSME OS  Bilateral PRP  PPV OS with Dr. 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 TEARS BID OS  REFRESH GEL BID OS   OMEGA E BID P.O.    Eylea od last 3/5/20  Eylea os 12/5/19          Last edited by GONZALO Keith on 5/13/2020 10:31 AM. (History)            Assessment /Plan     For exam results, see Encounter Report.    Type 2 diabetes mellitus with both eyes affected by proliferative retinopathy and macular edema, with long-term current use of insulin  -     Posterior Segment OCT Retina-Both eyes      ***

## 2020-05-14 ENCOUNTER — TELEPHONE (OUTPATIENT)
Dept: INTERNAL MEDICINE | Facility: CLINIC | Age: 75
End: 2020-05-14

## 2020-05-14 NOTE — TELEPHONE ENCOUNTER
Spoke with Meryl, pt's caregiver, she said, pt was seen by Dr. Tucker. Dr. Tucker thinks the edema pt is having in eyes is due to a salt pill he's taking. She's not sure of the name and doesn't know if it was something OTC that was recommended. Is this something pt can stop? He is coming in for f/u on 5/19

## 2020-05-18 ENCOUNTER — PATIENT MESSAGE (OUTPATIENT)
Dept: INTERNAL MEDICINE | Facility: CLINIC | Age: 75
End: 2020-05-18

## 2020-05-19 ENCOUNTER — OFFICE VISIT (OUTPATIENT)
Dept: INTERNAL MEDICINE | Facility: CLINIC | Age: 75
End: 2020-05-19
Payer: MEDICARE

## 2020-05-19 DIAGNOSIS — E11.65 UNCONTROLLED TYPE 2 DIABETES MELLITUS WITH HYPERGLYCEMIA: Primary | ICD-10-CM

## 2020-05-19 DIAGNOSIS — Z12.5 PROSTATE CANCER SCREENING: ICD-10-CM

## 2020-05-19 DIAGNOSIS — N18.30 STAGE 3 CHRONIC KIDNEY DISEASE: ICD-10-CM

## 2020-05-19 DIAGNOSIS — E11.69 DYSLIPIDEMIA ASSOCIATED WITH TYPE 2 DIABETES MELLITUS: ICD-10-CM

## 2020-05-19 DIAGNOSIS — E78.5 DYSLIPIDEMIA ASSOCIATED WITH TYPE 2 DIABETES MELLITUS: ICD-10-CM

## 2020-05-19 PROCEDURE — G0463 HOSPITAL OUTPT CLINIC VISIT: HCPCS | Mod: PO

## 2020-05-19 PROCEDURE — 99211 OFF/OP EST MAY X REQ PHY/QHP: CPT | Mod: PO

## 2020-05-19 PROCEDURE — 99214 OFFICE O/P EST MOD 30 MIN: CPT | Mod: 95,,, | Performed by: FAMILY MEDICINE

## 2020-05-19 PROCEDURE — 99214 PR OFFICE/OUTPT VISIT, EST, LEVL IV, 30-39 MIN: ICD-10-PCS | Mod: 95,,, | Performed by: FAMILY MEDICINE

## 2020-05-19 RX ORDER — MIDODRINE HYDROCHLORIDE 5 MG/1
5 TABLET ORAL EVERY 12 HOURS
Qty: 60 TABLET | Refills: 0 | Status: SHIPPED | OUTPATIENT
Start: 2020-05-19 | End: 2020-05-19 | Stop reason: SDUPTHER

## 2020-05-19 RX ORDER — MIDODRINE HYDROCHLORIDE 5 MG/1
5 TABLET ORAL EVERY 12 HOURS
Qty: 180 TABLET | Refills: 1 | Status: SHIPPED | OUTPATIENT
Start: 2020-05-19 | End: 2023-08-31

## 2020-05-19 NOTE — PROGRESS NOTES
Subjective:      Patient ID: Mono Bergman is a 74 y.o. male.    Chief Complaint:  F/u chronic conditions    HPI  75 yo male presents via telemed for f/u on chronic conditions.  He cont to eat his [employer] sandwich, ham and cheese daily.  He buys a whole sandwich and eats on it all day.  He was taking some type of salt pills to increase his BP and recently saw Dr. Tucker.  He was advised to stop so he is needing a refill on his midodrine.  Staying in during Covid/feeling well.  No major issues  The patient location is: Home  The chief complaint leading to consultation is: Chronic conditions    Visit type: audiovisual    Face to Face time with patient: 15 mins   minutes of total time spent on the encounter, which includes face to face time and non-face to face time preparing to see the patient (eg, review of tests), Obtaining and/or reviewing separately obtained history, Documenting clinical information in the electronic or other health record, Independently interpreting results (not separately reported) and communicating results to the patient/family/caregiver, or Care coordination (not separately reported).     Each patient to whom he or she provides medical services by telemedicine is:  (1) informed of the relationship between the physician and patient and the respective role of any other health care provider with respect to management of the patient; and (2) notified that he or she may decline to receive medical services by telemedicine and may withdraw from such care at any time.      Past Medical History:   Diagnosis Date    Arthritis     Cataract     Colon polyps 10/29/13    Diabetes mellitus type II     Diabetic retinopathy     GERD (gastroesophageal reflux disease) 7/18/2013    Hyperlipidemia LDL goal < 70 7/18/2013    Hypertension     Low BP    JOEL on CPAP     Uveitis 5/11/2016     Family History   Problem Relation Age of Onset    Heart disease Mother     Colon cancer Neg Hx      Past  Surgical History:   Procedure Laterality Date    CATARACT EXTRACTION W/  INTRAOCULAR LENS IMPLANT Left 12/10/14    CATARACT EXTRACTION W/  INTRAOCULAR LENS IMPLANT Right 07/13/2016    CHOLECYSTECTOMY      COLONOSCOPY N/A 2/3/2017    Procedure: COLONOSCOPY;  Surgeon: Natan Magdaleno MD;  Location: [facility];  Service: Endoscopy;  Laterality: N/A;    EYE SURGERY      KNEE SURGERY  1971    left knee    rk rt. eye       Social History     Tobacco Use    Smoking status: Never Smoker    Smokeless tobacco: Never Used   Substance Use Topics    Alcohol use: No    Drug use: No       There were no vitals taken for this visit.    Review of Systems   Constitutional: Negative for activity change and unexpected weight change.   HENT: Positive for rhinorrhea. Negative for hearing loss and trouble swallowing.    Eyes: Positive for visual disturbance. Negative for discharge.   Respiratory: Negative for chest tightness and wheezing.    Cardiovascular: Negative for chest pain and palpitations.   Gastrointestinal: Positive for constipation. Negative for blood in stool, diarrhea and vomiting.   Endocrine: Negative for polydipsia and polyuria.   Genitourinary: Negative for difficulty urinating, hematuria and urgency.   Musculoskeletal: Negative for arthralgias, joint swelling and neck pain.   Neurological: Positive for weakness. Negative for headaches.   Psychiatric/Behavioral: Negative for confusion and dysphoric mood.       Objective:     Physical Exam   Constitutional: He is oriented to person, place, and time. He appears well-developed and well-nourished. No distress.   HENT:   Head: Normocephalic and atraumatic.   Eyes: Right eye exhibits no discharge. Left eye exhibits no discharge.   Pulmonary/Chest: Effort normal. No respiratory distress.   Neurological: He is alert and oriented to person, place, and time.   Skin: He is not diaphoretic.   Psychiatric: He has a normal mood and affect. His behavior is normal.  Judgment and thought content normal.       Lab Results   Component Value Date    WBC 7.33 10/08/2019    HGB 13.3 (L) 10/08/2019    HCT 41.5 10/08/2019     10/08/2019    CHOL 133 10/08/2019    TRIG 100 10/08/2019    HDL 30 (L) 10/08/2019    ALT 29 02/06/2020    AST 24 02/06/2020     05/13/2020    K 4.3 05/13/2020     05/13/2020    CREATININE 1.6 (H) 05/13/2020    BUN 38 (H) 05/13/2020    CO2 30 (H) 05/13/2020    PSA 2.5 10/03/2019    INR 1.0 05/01/2017    HGBA1C 6.2 (H) 05/13/2020       Assessment:     1. Uncontrolled type 2 diabetes mellitus with hyperglycemia    2. Stage 3 chronic kidney disease    3. Dyslipidemia associated with type 2 diabetes mellitus    4. Prostate cancer screening         Plan:     Uncontrolled type 2 diabetes mellitus with hyperglycemia  -     Hemoglobin A1C; Future; Expected date: 05/19/2020  -     Microalbumin/creatinine urine ratio; Future; Expected date: 05/19/2020    Stage 3 chronic kidney disease  -     CBC auto differential; Future; Expected date: 05/19/2020  -     Comprehensive metabolic panel; Future; Expected date: 05/19/2020    Dyslipidemia associated with type 2 diabetes mellitus  -     Lipid Panel; Future; Expected date: 05/19/2020    Prostate cancer screening  -     PSA, Screening; Future; Expected date: 05/19/2020    Other orders  -     Discontinue: midodrine (PROAMATINE) 5 MG Tab; Take 1 tablet (5 mg total) by mouth every 12 (twelve) hours.  Dispense: 60 tablet; Refill: 0  -     midodrine (PROAMATINE) 5 MG Tab; Take 1 tablet (5 mg total) by mouth every 12 (twelve) hours.  Dispense: 180 tablet; Refill: 1    DM is improving/cont current regimen  CKD stable/stay off sodium.  Avoid NSAIDs.  Stay well hydrated with water  Low BP//refill on midodrine//avoid sodium pills  F/u with labs in 6 mos

## 2020-05-21 ENCOUNTER — TELEPHONE (OUTPATIENT)
Dept: INTERNAL MEDICINE | Facility: CLINIC | Age: 75
End: 2020-05-21

## 2020-05-21 ENCOUNTER — PATIENT MESSAGE (OUTPATIENT)
Dept: PODIATRY | Facility: CLINIC | Age: 75
End: 2020-05-21

## 2020-05-21 NOTE — TELEPHONE ENCOUNTER
----- Message from Rhonda Baez sent at 5/21/2020 10:37 AM CDT -----  Contact: pt home health nurse - Ms Dao   Type:  Needs Medical Advice    Who Called: pt nurse  Symptoms (please be specific): sinus   How long has patient had these symptoms:  A day or two  Pharmacy name and phone #:  Listed below   Would the patient rather a call back or a response via [facility]? Call back   Best Call Back Number:  [phone]  Additional Information:           [id number] [place] - [id number] [facility] [address]  Phone: [phone] Fax: [phone]

## 2020-05-21 NOTE — TELEPHONE ENCOUNTER
Pt's caregiver says, pt has congestion, watery eyes and sneezing. Since she has been with him since January, he gets these symptoms every now and then. She has to constantly wipe his nose. Denies cough or fever. She hasn't given him anything OTC because she didn't know what he can take with the medicines he's on.

## 2020-05-28 ENCOUNTER — PATIENT MESSAGE (OUTPATIENT)
Dept: DIABETES | Facility: CLINIC | Age: 75
End: 2020-05-28

## 2020-06-11 ENCOUNTER — PROCEDURE VISIT (OUTPATIENT)
Dept: OPHTHALMOLOGY | Facility: CLINIC | Age: 75
End: 2020-06-11
Payer: MEDICARE

## 2020-06-11 DIAGNOSIS — Z79.4 TYPE 2 DIABETES MELLITUS WITH BOTH EYES AFFECTED BY PROLIFERATIVE RETINOPATHY AND MACULAR EDEMA, WITH LONG-TERM CURRENT USE OF INSULIN: Primary | ICD-10-CM

## 2020-06-11 DIAGNOSIS — E11.3513 TYPE 2 DIABETES MELLITUS WITH BOTH EYES AFFECTED BY PROLIFERATIVE RETINOPATHY AND MACULAR EDEMA, WITH LONG-TERM CURRENT USE OF INSULIN: Primary | ICD-10-CM

## 2020-06-11 PROCEDURE — 92134 POSTERIOR SEGMENT OCT RETINA (OCULAR COHERENCE TOMOGRAPHY)-BOTH EYES: ICD-10-PCS | Mod: 26,S$PBB,, | Performed by: OPHTHALMOLOGY

## 2020-06-11 PROCEDURE — 67028 INJECTION EYE DRUG: CPT | Mod: PBBFAC,RT | Performed by: OPHTHALMOLOGY

## 2020-06-11 PROCEDURE — 67028 INJECTION EYE DRUG: CPT | Mod: S$PBB,RT,, | Performed by: OPHTHALMOLOGY

## 2020-06-11 PROCEDURE — 99499 UNLISTED E&M SERVICE: CPT | Mod: S$PBB,,, | Performed by: OPHTHALMOLOGY

## 2020-06-11 PROCEDURE — 99499 NO LOS: ICD-10-PCS | Mod: S$PBB,,, | Performed by: OPHTHALMOLOGY

## 2020-06-11 PROCEDURE — 67028 PR INJECT INTRAVITREAL PHARMCOLOGIC: ICD-10-PCS | Mod: S$PBB,RT,, | Performed by: OPHTHALMOLOGY

## 2020-06-11 PROCEDURE — 92134 CPTRZ OPH DX IMG PST SGM RTA: CPT | Mod: PBBFAC | Performed by: OPHTHALMOLOGY

## 2020-06-11 RX ADMIN — AFLIBERCEPT 2 MG: 40 INJECTION, SOLUTION INTRAVITREAL at 11:06

## 2020-06-11 NOTE — PROGRESS NOTES
===============================  Mono Bergman,  6/11/2020 today   74 y.o. male   Last visit [facility]: :5/13/2020   [facility]  [facility]  Uncorrected distance visual acuity was 20/100 in the right eye and 20/200 in the left eye.   Not recorded         Not recorded         Not recorded         Not recorded        Chief Complaint   Patient presents with    Macular Degeneration     eylea od       ________________  6/11/2020 today  HPI     Macular Degeneration      Additional comments: eylea od              Comments     1.) + DM SINCE 1997  2.) + PDR OU/ CSME OS  Bilateral PRP  PPV OS with Dr. 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 TEARS BID OS  REFRESH GEL BID OS   OMEGA E BID P.O.    Eylea od last 3/5/20  Eylea os 12/5/19          Last edited by Laya Carvalho on 6/11/2020 10:56 AM. (History)      Problem List Items Addressed This Visit        Eye/Vision problems    Type 2 diabetes mellitus with both eyes affected by proliferative retinopathy and macular edema, with long-term current use of insulin - Primary    Relevant Medications    aflibercept Soln 2 mg    Other Relevant Orders    Prior authorization Order    Posterior Segment OCT Retina-Both eyes          .better dme  On diuretic     6/11/2020  Diagnosis :  dme  Today:   Eylea (afibercept) 2 mg/0.05 ml Intravitreal Injection , OD   Follow up: 1 mo     Instructed to call 24/7 for any worsening of vision. Check Both eyes daily. Gave patient my home phone number.  Risks, benefits, and alternatives to treatment discussed in detail with the patient.  The patient voiced understanding and wished to proceed with the procedure     Injection Procedure Note:     Patient Identified and Time Out complete  Subconjunctival bleb - xylocaine with epi 2%   and Betadine.  Inject eylea od at 6:00  @ 3.5-4mm posterior to limbus  Post Operative Dx: Same  Complications: None  Follow up as above.      ===========================

## 2020-07-30 ENCOUNTER — PATIENT OUTREACH (OUTPATIENT)
Dept: ADMINISTRATIVE | Facility: OTHER | Age: 75
End: 2020-07-30

## 2020-08-13 ENCOUNTER — PROCEDURE VISIT (OUTPATIENT)
Dept: OPHTHALMOLOGY | Facility: CLINIC | Age: 75
End: 2020-08-13
Payer: MEDICARE

## 2020-08-13 DIAGNOSIS — E11.3513 TYPE 2 DIABETES MELLITUS WITH BOTH EYES AFFECTED BY PROLIFERATIVE RETINOPATHY AND MACULAR EDEMA, WITH LONG-TERM CURRENT USE OF INSULIN: Primary | ICD-10-CM

## 2020-08-13 DIAGNOSIS — Z79.4 TYPE 2 DIABETES MELLITUS WITH BOTH EYES AFFECTED BY PROLIFERATIVE RETINOPATHY AND MACULAR EDEMA, WITH LONG-TERM CURRENT USE OF INSULIN: Primary | ICD-10-CM

## 2020-08-13 PROCEDURE — 67028 INJECTION EYE DRUG: CPT | Mod: PBBFAC,RT | Performed by: OPHTHALMOLOGY

## 2020-08-13 PROCEDURE — 67028 INJECTION EYE DRUG: CPT | Mod: S$PBB,RT,, | Performed by: OPHTHALMOLOGY

## 2020-08-13 PROCEDURE — 99499 UNLISTED E&M SERVICE: CPT | Mod: S$PBB,,, | Performed by: OPHTHALMOLOGY

## 2020-08-13 PROCEDURE — 99499 NO LOS: ICD-10-PCS | Mod: S$PBB,,, | Performed by: OPHTHALMOLOGY

## 2020-08-13 PROCEDURE — 92134 POSTERIOR SEGMENT OCT RETINA (OCULAR COHERENCE TOMOGRAPHY)-BOTH EYES: ICD-10-PCS | Mod: 26,S$PBB,, | Performed by: OPHTHALMOLOGY

## 2020-08-13 PROCEDURE — 67028 PR INJECT INTRAVITREAL PHARMCOLOGIC: ICD-10-PCS | Mod: S$PBB,RT,, | Performed by: OPHTHALMOLOGY

## 2020-08-13 PROCEDURE — 92134 CPTRZ OPH DX IMG PST SGM RTA: CPT | Mod: PBBFAC | Performed by: OPHTHALMOLOGY

## 2020-08-13 RX ADMIN — AFLIBERCEPT 2 MG: 40 INJECTION, SOLUTION INTRAVITREAL at 10:08

## 2020-08-13 NOTE — PROGRESS NOTES
===============================  Mono Bergman,  8/13/2020 today   74 y.o. male   Last visit [facility]: :6/11/2020   Last [facility]  [facility]  Uncorrected distance visual acuity was 20/200 in the right eye and 20/200 in the left eye.   Not recorded         Not recorded         Not recorded         Not recorded        Chief Complaint   Patient presents with    Macular Degeneration     eylea od       ________________  8/13/2020 today  HPI     Macular Degeneration      Additional comments: eylea od              Comments     1.) + DM SINCE 1997  2.) + PDR OU/ CSME OS  Bilateral PRP  PPV OS with Dr. 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 TEARS BID OS  REFRESH GEL BID OS   OMEGA E BID P.O.    Eylea od last 6/11/20  Eylea os 12/5/19          Last edited by Laya Carvalho on 8/13/2020 10:06 AM. (History)      Problem List Items Addressed This Visit        Eye/Vision problems    Type 2 diabetes mellitus with both eyes affected by proliferative retinopathy and macular edema, with long-term current use of insulin - Primary    Relevant Medications    aflibercept Soln 2 mg    Other Relevant Orders    Posterior Segment OCT Retina-Both eyes    Prior authorization Order          ...    Injection Procedure Note:    8/13/2020  Diagnosis :  od dme  Today:   Eylea (afibercept) 2 mg/0.05 ml Intravitreal Injection , OD   Follow up: 1 mo ey od    Instructed to call 24/7 for any worsening of vision. Check Both eyes daily. Gave patient my home phone number.  Risks, benefits, and alternatives to treatment discussed in detail with the patient.  The patient voiced understanding and wished to proceed with the procedure.     Patient Identified and Time Out complete  Subconjunctival bleb - xylocaine with epi 2%   and Betadine.  Inject at Eylea (afibercept) 2  mg/0.05 ml Intravitreal Injection , OD 6:00 @ 3.5-4mm posterior to limbus  1 stop: yes   Post Operative Dx: Same  Complications: None  Follow up as above.        ===========================

## 2020-08-17 ENCOUNTER — PATIENT OUTREACH (OUTPATIENT)
Dept: ADMINISTRATIVE | Facility: OTHER | Age: 75
End: 2020-08-17

## 2020-08-18 ENCOUNTER — CLINICAL SUPPORT (OUTPATIENT)
Dept: DIABETES | Facility: CLINIC | Age: 75
End: 2020-08-18
Payer: MEDICARE

## 2020-08-18 DIAGNOSIS — E11.65 UNCONTROLLED TYPE 2 DIABETES MELLITUS WITH HYPERGLYCEMIA: Primary | ICD-10-CM

## 2020-08-18 PROCEDURE — G0108 DIAB MANAGE TRN  PER INDIV: HCPCS | Mod: PBBFAC | Performed by: DIETITIAN, REGISTERED

## 2020-08-18 PROCEDURE — 99213 OFFICE O/P EST LOW 20 MIN: CPT | Mod: PBBFAC | Performed by: DIETITIAN, REGISTERED

## 2020-08-18 PROCEDURE — 99999 PR PBB SHADOW E&M-EST. PATIENT-LVL III: CPT | Mod: PBBFAC,,, | Performed by: DIETITIAN, REGISTERED

## 2020-08-18 PROCEDURE — 99999 PR PBB SHADOW E&M-EST. PATIENT-LVL III: ICD-10-PCS | Mod: PBBFAC,,, | Performed by: DIETITIAN, REGISTERED

## 2020-08-18 PROCEDURE — 95249 CONT GLUC MNTR PT PROV EQP: CPT | Mod: PBBFAC | Performed by: DIETITIAN, REGISTERED

## 2020-08-18 NOTE — LETTER
August 18, 2020        Catalina Hagan PA-C  [address]  [place] [address]             [facility]  [address] [facility] [address]  Phone: [phone]  Fax: [phone]   Patient: Mono Bergman   MR Number: [id number]   YOB: 1945   Date of Visit: 8/18/2020       Dear Dr. Hagan:    Thank you for referring Mono Bergman to me for evaluation. Below are the relevant portions of my assessment and plan of care.    If you have questions, please do not hesitate to call me. I look forward to following Mono along with you.    Sincerely,      Marie Noble RD, CDE           CC  Hermelindo Ash MD

## 2020-08-18 NOTE — PROGRESS NOTES
"Diabetes Education  Author: Marie Noble RD, CDE  Date: 8/18/2020    Diabetes Care Management Summary  Diabetes Education Record Assessment/Progress: Initial(3/5/20 Dexcom start, assessment 8/18/20)  Current Diabetes Risk Level: Moderate     Last A1c:   Lab Results   Component Value Date    HGBA1C 6.2 (H) 05/13/2020     Diabetes Type  Diabetes Type : Type II(GFR 42.4)    Diabetes History  Diabetes Diagnosis: >10 years  Current Treatment: Diet, Exercise, Insulin(lantus 50units daily; novolog 8units bfst, 12units lunch, 12units dinner. Pt reports taking Lantus 20-60units 1-2 times daily and Novolog irregular dosing 0-12units after meals instead of before meals.)  Reviewed Problem List with Patient: Yes    Health Maintenance was reviewed today with patient. Discussed with patient importance of routine eye exams, foot exams/foot care, blood work (i.e.: A1c, microalbumin, and lipid), dental visits, yearly flu vaccine, and pneumonia vaccine as indicated by PCP. Patient verbalized understanding.     Health Maintenance Topics with due status: Not Due       Topic Last Completion Date    PROSTATE-SPECIFIC ANTIGEN 10/03/2019    Influenza Vaccine 10/03/2019    Lipid Panel 10/08/2019    Urine Microalbumin 01/07/2020    Colorectal Cancer Screening 01/30/2020    Foot Exam 02/06/2020    Hemoglobin A1c 05/13/2020    Eye Exam 05/13/2020    Low Dose Statin 08/13/2020     Health Maintenance Due   Topic Date Due    TETANUS VACCINE  08/17/1965    Shingles Vaccine (1 of 2) 08/17/1997       Nutrition  Meal Planning: (Intake ~1500cals/d. Subway (ham, cheese) 4-5" three times daily. Occs Chinese take-out. Brief period using [employer] Meals on Wheels. Snacks on pork skins, tootsie pops. Mar- primarily water.)    Monitoring   Self Monitoring : Dexcom CGM downloaded, report saved media. Avg 30d  with 72.1% readings within target (70-180mg/dl), 25.3% readings >180mg/dl, 2.6% <70mg/dl. CGM usage 83.2%. Excursions from missed Novolog " doses. Random patterns hypoglycemia related to patient self-adjusting Lantus/Novolog.  In the last month, how often have you had a low blood sugar reaction?: more than once a week(due pt self-adjusting insulin; treats using tootsie pops)    Exercise   Exercise Type: none    Social History  Preferred Learning Method: Face to Face  Primary Support: Self, Caregiver  Smoking Status: Never a Smoker  Alcohol Use: Never     Barriers to Change  Barriers to Change: None  Learning Challenges : Hearing  Hearing - further explanation: (wears hearing aid but still w/ difficulty hearing)  Hearing -  present?: (Caregiver, MA with patient today to assist)    Readiness to Learn   Readiness to Learn : Eager    Cultural Influences  Cultural Influences: No    Diabetes Education Assessment/Progress  Diabetes Disease Process (diabetes disease process and treatment options): Discussion, Needs Review, Instructed, Individual Session, Demonstrates Understanding/Competency(verbalizes/demonstrates)  Nutrition (Incorporating nutritional management into one's lifestyle): Discussion, Needs Review, Instructed, Individual Session, Demonstrates Understanding/Competency (verbalizes/demonstrates), Written Materials Provided  Physical Activity (incorporating physical activity into one's lifestyle): Discussion, Needs Review, Instructed, Individual Session, Demonstrates Understanding/Competency (verbalizes/demonstrates)  Medications (states correct name, dose, onset, peak, duration, side effects & timing of meds): Discussion, Needs Review, Instructed, Individual Session, Demonstrates Understanding/Competency(verbalizes/demonstrates), Written Materials Provided  Monitoring (monitoring blood glucose/other parameters & using results): Discussion, Individual Session, Demonstrates Understanding/Competency (verbalizes/demonstrates)  Acute Complications (preventing, detecting, and treating acute complications): Discussion, Needs Review, Instructed,  Individual Session, Demonstrates Understanding/Competency (verbalizes/demonstrates)  Chronic Complications (preventing, detecting, and treating chronic complications): Comprehends Key Points  Clinical (diabetes, other pertinent medical history, and relevant comorbidities reviewed during visit): Discussion, Individual Session, Demonstrates Understanding/Competency (verbalizes/demonstrates)  Cognitive (knowledge of self-management skills, functional health literacy): Discussion, Needs Review, Instructed, Individual Session, Demonstrates Understanding/Competency (verbalizes/demonstrates)  Psychosocial (emotional response to diabetes): Not Covered/Deferred  Diabetes Distress and Support Systems: Not Covered/Deferred  Behavioral (readiness for change, lifestyle practices, self-care behaviors): Discussion, Needs Review, Instructed, Individual Session, Demonstrates Understanding/Competency (verbalizes/demonstrates)    Goals  Patient has selected/evaluated goals during today's session: Yes, evaluated  Healthy Eating: Set(food variety -[employer] Meals on Wheels, use healthy MR entrees (list provided))  Start Date: 08/18/20  Target Date: 11/12/20  Monitoring: Set(use Dexcom as directed)  Met Percentage : 100%  Start Date: 08/18/20  Target Date: 11/12/20  Medications: Set(dose diabetes medications as directed)  Start Date: 08/18/20  Target Date: 11/12/20     Diabetes Care Plan/Intervention  Education Plan/Intervention: Individual Follow-Up DSMT Appt coord for pt to see [facility] for medical mgmt. Emphasis with pt today on insulin action, keeping dose and timing consistent and using bolus insulin before meals. Encouraged continued Dexcom usage. Discussed role food variety in diabetes and overall health w/ meal options reviewed. RTC ~3mos to alternate w/ [facility]; sooner prn or as referred.     Diabetes Meal Plan  Restrictions: Low Fat, Low Sodium, Restricted Carbohydrate  Calories: 1500  Carbohydrate Per Meal: 30-45g  Carbohydrate  Per Snack : 7-15g    Today's Self-Management Care Plan was developed with the patient's input and is based on barriers identified during today's assessment.    The long and short-term goals in the care plan were written with the patient/caregiver's input. The patient has agreed to work toward these goals to improve his overall diabetes control.      The patient received a copy of today's self-management plan and verbalized understanding of the care plan, goals, and all of today's instructions.      The patient was encouraged to communicate with his physician and care team regarding his condition(s) and treatment.  I provided the patient with my contact information today and encouraged him to contact me via phone or patient portal as needed.     Education Units of Time   Time Spent: 90 min

## 2020-09-10 ENCOUNTER — OFFICE VISIT (OUTPATIENT)
Dept: INTERNAL MEDICINE | Facility: CLINIC | Age: 75
End: 2020-09-10
Payer: MEDICARE

## 2020-09-10 DIAGNOSIS — E11.65 UNCONTROLLED TYPE 2 DIABETES MELLITUS WITH HYPERGLYCEMIA: ICD-10-CM

## 2020-09-10 DIAGNOSIS — K21.9 GASTROESOPHAGEAL REFLUX DISEASE, ESOPHAGITIS PRESENCE NOT SPECIFIED: Primary | ICD-10-CM

## 2020-09-10 PROCEDURE — 99214 OFFICE O/P EST MOD 30 MIN: CPT | Mod: 95,,, | Performed by: FAMILY MEDICINE

## 2020-09-10 PROCEDURE — 99214 PR OFFICE/OUTPT VISIT, EST, LEVL IV, 30-39 MIN: ICD-10-PCS | Mod: 95,,, | Performed by: FAMILY MEDICINE

## 2020-09-10 RX ORDER — PANTOPRAZOLE SODIUM 40 MG/1
40 TABLET, DELAYED RELEASE ORAL DAILY
Qty: 30 TABLET | Refills: 3 | Status: SHIPPED | OUTPATIENT
Start: 2020-09-10 | End: 2022-04-12 | Stop reason: SDUPTHER

## 2020-09-10 RX ORDER — PANTOPRAZOLE SODIUM 40 MG/1
40 TABLET, DELAYED RELEASE ORAL DAILY
Qty: 30 TABLET | Refills: 3 | Status: SHIPPED | OUTPATIENT
Start: 2020-09-10 | End: 2020-09-10 | Stop reason: SDUPTHER

## 2020-09-10 NOTE — PROGRESS NOTES
Subjective:      Patient ID: Mono Bergman is a 75 y.o. male.    Chief Complaint:  Acid reflux/gas    HPI   74 yo male presents via telemed with c/o acid reflux and gas.  Has been eating multiple rolaids daily.  He stays primarily in the bed most the time.  He sleeps during the day, goes to sleep around 3AM and sleeps until afternoon.  He lays in bed all night watching movies.  He eats subway sandwiches 3 times a day, last one around 9:30/10.  He then lays down after eating.  Using laxative for bowels, more loose lately b/c of how much he is drinking.  No black stool/no bleeding.  No abd pain, N/V  No change in weight    The patient location is: Home  The chief complaint leading to consultation is: GERD    Visit type: Audiovisual    Face to Face time with patient: 10 mins   minutes of total time spent on the encounter, which includes face to face time and non-face to face time preparing to see the patient (eg, review of tests), Obtaining and/or reviewing separately obtained history, Documenting clinical information in the electronic or other health record, Independently interpreting results (not separately reported) and communicating results to the patient/family/caregiver, or Care coordination (not separately reported).         Each patient to whom he or she provides medical services by telemedicine is:  (1) informed of the relationship between the physician and patient and the respective role of any other health care provider with respect to management of the patient; and (2) notified that he or she may decline to receive medical services by telemedicine and may withdraw from such care at any time.    Notes:     Past Medical History:   Diagnosis Date    Arthritis     Cataract     Colon polyps 10/29/13    Diabetes mellitus type II     Diabetic retinopathy     GERD (gastroesophageal reflux disease) 7/18/2013    Hyperlipidemia LDL goal < 70 7/18/2013    Hypertension     Low BP    JOEL on CPAP      Uveitis 5/11/2016     Family History   Problem Relation Age of Onset    Heart disease Mother     Colon cancer Neg Hx      Past Surgical History:   Procedure Laterality Date    CATARACT EXTRACTION W/  INTRAOCULAR LENS IMPLANT Left 12/10/14    CATARACT EXTRACTION W/  INTRAOCULAR LENS IMPLANT Right 07/13/2016    CHOLECYSTECTOMY      COLONOSCOPY N/A 2/3/2017    Procedure: COLONOSCOPY;  Surgeon: Natan Magdaleno MD;  Location[facility];  Service: Endoscopy;  Laterality: N/A;    EYE SURGERY      KNEE SURGERY  1971    left knee    rk rt. eye       Social History     Tobacco Use    Smoking status: Never Smoker    Smokeless tobacco: Never Used   Substance Use Topics    Alcohol use: No    Drug use: No       There were no vitals taken for this visit.    Review of Systems   Gastrointestinal: Positive for constipation and diarrhea. Negative for blood in stool.        GERD/gas       Objective:     Physical Exam  Constitutional:       General: He is not in acute distress.     Appearance: He is well-developed. He is not diaphoretic.   HENT:      Head: Normocephalic and atraumatic.   Eyes:      General:         Right eye: No discharge.         Left eye: No discharge.   Pulmonary:      Effort: Pulmonary effort is normal. No respiratory distress.   Neurological:      Mental Status: He is alert and oriented to person, place, and time.   Psychiatric:         Mood and Affect: Mood normal.         Behavior: Behavior normal.         Thought Content: Thought content normal.         Judgment: Judgment normal.         Lab Results   Component Value Date    WBC 7.33 10/08/2019    HGB 13.3 (L) 10/08/2019    HCT 41.5 10/08/2019     10/08/2019    CHOL 133 10/08/2019    TRIG 100 10/08/2019    HDL 30 (L) 10/08/2019    ALT 29 02/06/2020    AST 24 02/06/2020     05/13/2020    K 4.3 05/13/2020     05/13/2020    CREATININE 1.6 (H) 05/13/2020    BUN 38 (H) 05/13/2020    CO2 30 (H) 05/13/2020    PSA 2.5 10/03/2019     INR 1.0 05/01/2017    HGBA1C 6.2 (H) 05/13/2020       Assessment:     1. Gastroesophageal reflux disease, esophagitis presence not specified    2. Uncontrolled type 2 diabetes mellitus with hyperglycemia         Plan:     Gastroesophageal reflux disease, esophagitis presence not specified    Uncontrolled type 2 diabetes mellitus with hyperglycemia    Other orders  -     Discontinue: pantoprazole (PROTONIX) 40 MG tablet; Take 1 tablet (40 mg total) by mouth once daily.  Dispense: 30 tablet; Refill: 3  -     pantoprazole (PROTONIX) 40 MG tablet; Take 1 tablet (40 mg total) by mouth once daily.  Dispense: 30 tablet; Refill: 3      DM controlled  Stop eating/laying down  Start PPI each AM  Careful with acidic foods/caffeinated drinks  Stay on PPI until visit with me in a few mos  F/u PRN

## 2020-09-15 ENCOUNTER — PATIENT MESSAGE (OUTPATIENT)
Dept: INTERNAL MEDICINE | Facility: CLINIC | Age: 75
End: 2020-09-15

## 2020-09-16 ENCOUNTER — PATIENT MESSAGE (OUTPATIENT)
Dept: INTERNAL MEDICINE | Facility: CLINIC | Age: 75
End: 2020-09-16

## 2020-09-16 NOTE — TELEPHONE ENCOUNTER
Last visit was a video visit, so I need to see the form to see what is necessary and decide if video visit is ok vs in person.

## 2020-09-22 ENCOUNTER — TELEPHONE (OUTPATIENT)
Dept: INTERNAL MEDICINE | Facility: CLINIC | Age: 75
End: 2020-09-22

## 2020-09-22 NOTE — TELEPHONE ENCOUNTER
----- Message from Je Calderón sent at 9/22/2020 12:18 PM CDT -----  Type:  Needs Medical Advice    Who Called: Meryl Dao - caregiver   Symptoms (please be specific):   How long has patient had these symptoms:    Pharmacy name and phone #:    Would the patient rather a call back or a response via [facility]? Call back  Best Call Back Number: [phone]  Additional Information:   Caller is requesting a call back in regards to pt dental surgery clearing. Please call back to confirm.

## 2020-09-22 NOTE — TELEPHONE ENCOUNTER
Called Meryl and let her know we have the form, Dr. Ash said patient is not on blood thinners other than the ASA.  She said yes, that is all he is on.   Need form to be filled out and signed.  Form in your folder.

## 2020-10-01 ENCOUNTER — PATIENT MESSAGE (OUTPATIENT)
Dept: OTHER | Facility: OTHER | Age: 75
End: 2020-10-01

## 2020-10-01 ENCOUNTER — PROCEDURE VISIT (OUTPATIENT)
Dept: OPHTHALMOLOGY | Facility: CLINIC | Age: 75
End: 2020-10-01
Payer: MEDICARE

## 2020-10-01 ENCOUNTER — TELEPHONE (OUTPATIENT)
Dept: OPHTHALMOLOGY | Facility: CLINIC | Age: 75
End: 2020-10-01

## 2020-10-01 DIAGNOSIS — E11.3513 TYPE 2 DIABETES MELLITUS WITH BOTH EYES AFFECTED BY PROLIFERATIVE RETINOPATHY AND MACULAR EDEMA, WITH LONG-TERM CURRENT USE OF INSULIN: Primary | ICD-10-CM

## 2020-10-01 DIAGNOSIS — Z79.4 TYPE 2 DIABETES MELLITUS WITH BOTH EYES AFFECTED BY PROLIFERATIVE RETINOPATHY AND MACULAR EDEMA, WITH LONG-TERM CURRENT USE OF INSULIN: Primary | ICD-10-CM

## 2020-10-01 PROCEDURE — 99499 NO LOS: ICD-10-PCS | Mod: S$PBB,,, | Performed by: OPHTHALMOLOGY

## 2020-10-01 PROCEDURE — 67028 PR INJECT INTRAVITREAL PHARMCOLOGIC: ICD-10-PCS | Mod: S$PBB,RT,, | Performed by: OPHTHALMOLOGY

## 2020-10-01 PROCEDURE — 92134 CPTRZ OPH DX IMG PST SGM RTA: CPT | Mod: PBBFAC | Performed by: OPHTHALMOLOGY

## 2020-10-01 PROCEDURE — 99499 UNLISTED E&M SERVICE: CPT | Mod: S$PBB,,, | Performed by: OPHTHALMOLOGY

## 2020-10-01 PROCEDURE — 67028 INJECTION EYE DRUG: CPT | Mod: PBBFAC,RT | Performed by: OPHTHALMOLOGY

## 2020-10-01 PROCEDURE — 92134 POSTERIOR SEGMENT OCT RETINA (OCULAR COHERENCE TOMOGRAPHY)-BOTH EYES: ICD-10-PCS | Mod: 26,S$PBB,, | Performed by: OPHTHALMOLOGY

## 2020-10-01 PROCEDURE — 67028 INJECTION EYE DRUG: CPT | Mod: S$PBB,RT,, | Performed by: OPHTHALMOLOGY

## 2020-10-01 RX ORDER — PREDNISOLONE ACETATE 10 MG/ML
1 SUSPENSION/ DROPS OPHTHALMIC 4 TIMES DAILY
Qty: 10 ML | Refills: 1 | Status: SHIPPED | OUTPATIENT
Start: 2020-10-01 | End: 2020-11-30

## 2020-10-01 RX ORDER — KETOROLAC TROMETHAMINE 5 MG/ML
1 SOLUTION OPHTHALMIC 4 TIMES DAILY
Qty: 10 ML | Refills: 1 | Status: SHIPPED | OUTPATIENT
Start: 2020-10-01 | End: 2021-10-01

## 2020-10-01 RX ADMIN — AFLIBERCEPT 2 MG: 40 INJECTION, SOLUTION INTRAVITREAL at 10:10

## 2020-10-01 NOTE — PATIENT INSTRUCTIONS
Prednisolone (pink top) 1 drop in your RIGHT eye 4 times daily until you return.  Ketorolac (grey top) 1 drop in your RIGHT eye 4 times daily until you return.    If you run out, please refill, but you should have enough to last until you return.  We need to check your pressure WHILE ON THESE DROPS to determine if we can add a steroid shot to decrease your macular edema.  So, it is important you are still on the drops when you return.

## 2020-10-01 NOTE — PROGRESS NOTES
===============================  Mono Bergman,  10/1/2020 today   75 y.o. male   Last visit [facility]: :8/13/2020   Last visit eye dept. 8/13/2020  [facility]:  Uncorrected distance visual acuity was 20/400 in the right eye and 20/200 in the left eye.   Not recorded         Not recorded         Not recorded         Not recorded        Chief Complaint   Patient presents with    Macular Degeneration     eylea od     Ophthalmic Medications     Ophthalmic - Anti-inflammatory, Glucocorticoids Start End     prednisoLONE acetate (PRED FORTE) 1 % DrpS    10/1/2020 11/30/2020    Sig: Place 1 drop into both eyes 4 (four) times daily.    Route: Both Eyes    Ophthalmic - Anti-inflammatory, NSAIDs Start End     ketorolac 0.5% (ACULAR) 0.5 % Drop    10/1/2020 10/1/2021    Sig: Place 1 drop into both eyes 4 (four) times daily.    Route: Both Eyes        ________________  10/1/2020 today  HPI     Macular Degeneration      Additional comments: eylea od              Comments     1.) + DM SINCE 1997  2.) + PDR OU/ CSME OS  Bilateral PRP  PPV OS with Dr. 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 TEARS BID OS  REFRESH GEL BID OS   OMEGA E BID P.O.    Eylea od last  8/13/20  Eylea os 12/5/19          Last edited by Laya Carvalho on 10/1/2020 10:17 AM. (History)      Problem List Items Addressed This Visit        Eye/Vision problems    Type 2 diabetes mellitus with both eyes affected by proliferative retinopathy and macular edema, with long-term current use of insulin - Primary    Relevant Medications    aflibercept Soln 2 mg (Completed)    prednisoLONE acetate (PRED FORTE) 1 % DrpS    ketorolac 0.5% (ACULAR) 0.5 % Drop    Other Relevant Orders    Posterior Segment OCT Retina-Both eyes (Completed)    Prior authorization Order    Prior authorization Order          ...worse  today  eylea OD today  Will begin pred forte and ketorolac qid OD  Next visit IOP check  Prior auth today for ozurdex    Injection Procedure Note:    10/1/2020  Diagnosis :  od dme  Today:   Eylea (afibercept) 2 mg/0.05 ml Intravitreal Injection , OD   Follow up: 1 mo    Instructed to call 24/7 for any worsening of vision. Check Both eyes daily. Gave patient my home phone number.  Risks, benefits, and alternatives to treatment discussed in detail with the patient.  The patient voiced understanding and wished to proceed with the procedure.     Patient Identified and Time Out complete  Subconjunctival bleb - xylocaine with epi 2%   and Betadine.  Inject at Eylea (afibercept) 2 mg/0.05 ml Intravitreal Injection , OD 6:00 @ 3.5-4mm posterior to limbus  1 stop: yes   Post Operative Dx: Same  Complications: None  Follow up as above.        ===========================

## 2020-10-01 NOTE — TELEPHONE ENCOUNTER
Referral faxed to [facility] then sent to [facility] to scan into chart.

## 2020-10-12 ENCOUNTER — PATIENT OUTREACH (OUTPATIENT)
Dept: ADMINISTRATIVE | Facility: OTHER | Age: 75
End: 2020-10-12

## 2020-10-13 ENCOUNTER — OFFICE VISIT (OUTPATIENT)
Dept: DIABETES | Facility: CLINIC | Age: 75
End: 2020-10-13
Payer: MEDICARE

## 2020-10-13 VITALS
DIASTOLIC BLOOD PRESSURE: 63 MMHG | SYSTOLIC BLOOD PRESSURE: 128 MMHG | BODY MASS INDEX: 35.2 KG/M2 | HEART RATE: 63 BPM | WEIGHT: 231.5 LBS

## 2020-10-13 DIAGNOSIS — E66.9 OBESITY (BMI 30-39.9): ICD-10-CM

## 2020-10-13 DIAGNOSIS — I15.2 HYPERTENSION ASSOCIATED WITH DIABETES: ICD-10-CM

## 2020-10-13 DIAGNOSIS — E11.69 HYPERLIPIDEMIA ASSOCIATED WITH TYPE 2 DIABETES MELLITUS: ICD-10-CM

## 2020-10-13 DIAGNOSIS — E78.5 HYPERLIPIDEMIA ASSOCIATED WITH TYPE 2 DIABETES MELLITUS: ICD-10-CM

## 2020-10-13 DIAGNOSIS — E11.59 HYPERTENSION ASSOCIATED WITH DIABETES: ICD-10-CM

## 2020-10-13 DIAGNOSIS — E11.3513 PROLIFERATIVE DIABETIC RETINOPATHY OF BOTH EYES WITH MACULAR EDEMA ASSOCIATED WITH TYPE 2 DIABETES MELLITUS: ICD-10-CM

## 2020-10-13 DIAGNOSIS — E11.65 UNCONTROLLED TYPE 2 DIABETES MELLITUS WITH HYPERGLYCEMIA: Primary | ICD-10-CM

## 2020-10-13 DIAGNOSIS — E11.649 HYPOGLYCEMIA ASSOCIATED WITH TYPE 2 DIABETES MELLITUS: ICD-10-CM

## 2020-10-13 LAB
GLUCOSE SERPL-MCNC: 104 MG/DL (ref 70–110)
GLUCOSE SERPL-MCNC: 56 MG/DL (ref 70–110)
GLUCOSE SERPL-MCNC: 66 MG/DL (ref 70–110)

## 2020-10-13 PROCEDURE — 82962 GLUCOSE BLOOD TEST: CPT | Mod: PBBFAC,91 | Performed by: PHYSICIAN ASSISTANT

## 2020-10-13 PROCEDURE — 99999 PR PBB SHADOW E&M-EST. PATIENT-LVL V: ICD-10-PCS | Mod: PBBFAC,,, | Performed by: PHYSICIAN ASSISTANT

## 2020-10-13 PROCEDURE — 99215 OFFICE O/P EST HI 40 MIN: CPT | Mod: S$PBB,,, | Performed by: PHYSICIAN ASSISTANT

## 2020-10-13 PROCEDURE — 99215 PR OFFICE/OUTPT VISIT, EST, LEVL V, 40-54 MIN: ICD-10-PCS | Mod: S$PBB,,, | Performed by: PHYSICIAN ASSISTANT

## 2020-10-13 PROCEDURE — 95251 PR GLUCOSE MONITOR, 72 HOUR, PHYS INTERP: ICD-10-PCS | Mod: ,,, | Performed by: PHYSICIAN ASSISTANT

## 2020-10-13 PROCEDURE — 95251 CONT GLUC MNTR ANALYSIS I&R: CPT | Mod: ,,, | Performed by: PHYSICIAN ASSISTANT

## 2020-10-13 PROCEDURE — 99215 OFFICE O/P EST HI 40 MIN: CPT | Mod: PBBFAC | Performed by: PHYSICIAN ASSISTANT

## 2020-10-13 PROCEDURE — 99999 PR PBB SHADOW E&M-EST. PATIENT-LVL V: CPT | Mod: PBBFAC,,, | Performed by: PHYSICIAN ASSISTANT

## 2020-10-13 NOTE — PROGRESS NOTES
PCP: Hermelindo Ash MD    Subjective:     Chief Complaint: Diabetes - Established Patient    HISTORY OF PRESENT ILLNESS: 75 y.o.   male presenting for diabetes management visit.   Patient has previously been established with the Diabetes Management Department and was last seen by Usha Marquez NP on 02/06/20.   Patient is new to me and is here for establishment of future care .   Patient has had Type II diabetes since greater than 10 years ago.  Pertinent to decision making is the following comorbidities: HTN, HLD and Obesity by BMI  Patient has the following Diabetes complications: with diabetic retinopathy; proliferative; with macular edema  He  has attended diabetes education in the past.     Patient's most recent A1c of 6.2% was completed 5 months ago.   Patient states since His last A1c His blood glucose levels have been both high and low throughout the day .   Patient monitors blood glucose 4 times per day and Continuously with personal CGM Dexcom.   Patient blood glucose monitoring device will be uploaded into [employer] today.  Interpretation of CGM meter includes Avg  mg/dl with SD of 59. GMI of 7.4%. Patient's time in range was 57%, time above range was 41%, and time below ranges was 2%.   Patient's records show hypoglycemia overnight secondary to basal coverage or correction dosing and mixed postprandial hyperglycemia. After discussion, hypoglycemia likely related to ranging insulin and taking Novolog after meals.   Patient endorses the following diabetes related symptoms: None.   Patient is due today for the following diabetes-related health maintenance standards: Lipid panel and Flu Shot. Patient had flu shot outside of [facility] 1 week ago.   He denies recent hospital admissions or emergency room visits.   He voices having hypoglycemia as above.   Patient's concerns today include glycemic control. Of note, patient reports eating breakfast at 11am, lunch at 3 pm, and dinner  between 6-9 pm. Patient reports he feels more comfortable going to bed high than normal because he has been having issues with hypoglycemia.   Patient medication regimen is as below.     CURRENT DM MEDICATIONS:    Lantus 20-60 units depending on BG - patient did not change to 50 units daily as instructed.     Novolog 12 units after meals - patient did not change to before meals as instructed.     Patient has failed the following Diabetes medications:    70/30   Glyburide   Metformin       Labs Reviewed.       No results found for: CPEPTIDE  No results found for: GLUTAMICACID       //   , Body mass index is 35.2 kg/m².  His blood sugar in clinic today is:    Lab Results   Component Value Date    POCGLU 104 10/13/2020       Review of Systems   Constitutional: Negative for activity change, appetite change, chills and fever.   HENT: Negative for dental problem, mouth sores, nosebleeds, sore throat and trouble swallowing.    Eyes: Negative for pain and discharge.   Respiratory: Negative for shortness of breath, wheezing and stridor.    Cardiovascular: Negative for chest pain, palpitations and leg swelling.   Gastrointestinal: Negative for abdominal pain, diarrhea, nausea and vomiting.   Endocrine: Negative for polydipsia, polyphagia and polyuria.   Genitourinary: Negative for dysuria, frequency and urgency.   Musculoskeletal: Negative for joint swelling and myalgias.   Skin: Negative for rash and wound.   Neurological: Negative for dizziness, syncope, weakness and headaches.   Psychiatric/Behavioral: Negative for behavioral problems and dysphoric mood.         Diabetes Management Status  Statin: Taking  ACE/ARB: Not taking    Screening or Prevention Patient's value Goal Complete/Controlled?   HgA1C Testing and Control   Lab Results   Component Value Date    HGBA1C 6.2 (H) 05/13/2020      Annually/Less than 8% Yes   Lipid profile : 10/08/2019 Annually No   LDL control Lab Results   Component Value Date    LDLCALC  83.0 10/08/2019    Annually/Less than 100 mg/dl  No   Nephropathy screening Lab Results   Component Value Date    MICALBCREAT 100.9 (H) 01/07/2020     No results found for: PROTEINUA Annually Yes   Blood pressure BP Readings from Last 1 Encounters:   10/13/20 128/63    Less than 140/90 Yes   Dilated retinal exam : 05/13/2020 Annually Yes    Foot exam   : 02/06/2020 Annually Yes     Social History     Socioeconomic History    Marital status: Single     Spouse name: Not on file    Number of children: 0    Years of education: Not on file    Highest education level: Not on file   Occupational History    Occupation: retired     Employer: retired   Social Needs    Financial resource strain: Not on file    Food insecurity     Worry: Not on file     Inability: Not on file    Transportation needs     Medical: Not on file     Non-medical: Not on file   Tobacco Use    Smoking status: Never Smoker    Smokeless tobacco: Never Used   Substance and Sexual Activity    Alcohol use: No    Drug use: No    Sexual activity: Not Currently   Lifestyle    Physical activity     Days per week: Not on file     Minutes per session: Not on file    Stress: Not on file   Relationships    Social connections     Talks on phone: Not on file     Gets together: Not on file     Attends Denominational service: Not on file     Active member of club or organization: Not on file     Attends meetings of clubs or organizations: Not on file     Relationship status: Not on file   Other Topics Concern    Not on file   Social History Narrative    Not on file     Past Medical History:   Diagnosis Date    Arthritis     Cataract     Colon polyps 10/29/13    Diabetes mellitus type II     Diabetic retinopathy     GERD (gastroesophageal reflux disease) 7/18/2013    Hyperlipidemia LDL goal < 70 7/18/2013    Hypertension     Low BP    JOEL on CPAP     Uveitis 5/11/2016       Objective:        Physical Exam  Constitutional:       General: He is not  in acute distress.     Appearance: He is well-developed. He is not diaphoretic.   HENT:      Head: Normocephalic and atraumatic.      Right Ear: External ear normal.      Left Ear: External ear normal.      Nose: Nose normal.   Eyes:      General:         Right eye: No discharge.         Left eye: No discharge.      Pupils: Pupils are equal, round, and reactive to light.   Neck:      Musculoskeletal: Normal range of motion and neck supple.   Cardiovascular:      Rate and Rhythm: Normal rate and regular rhythm.      Heart sounds: Normal heart sounds.   Pulmonary:      Effort: Pulmonary effort is normal.      Breath sounds: Normal breath sounds.   Abdominal:      Palpations: Abdomen is soft.   Musculoskeletal: Normal range of motion.   Skin:     General: Skin is warm and dry.      Capillary Refill: Capillary refill takes less than 2 seconds.   Neurological:      Mental Status: He is alert and oriented to person, place, and time.      Motor: No abnormal muscle tone.      Coordination: Coordination normal.   Psychiatric:         Behavior: Behavior normal.         Thought Content: Thought content normal.         Judgment: Judgment normal.           Assessment / Plan:     Uncontrolled type 2 diabetes mellitus with hyperglycemia  -     POCT Glucose, Hand-Held Device  -     Lipid Panel; Future; Expected date: 10/13/2020  -     Hemoglobin A1C; Standing  -     POCT Glucose, Hand-Held Device  -     POCT Glucose, Hand-Held Device    Hypoglycemia associated with type 2 diabetes mellitus    Proliferative diabetic retinopathy of both eyes with macular edema associated with type 2 diabetes mellitus    Hypertension associated with diabetes    Hyperlipidemia associated with type 2 diabetes mellitus    Obesity (BMI 30-39.9)      Additional Plan Details:    - POCT Glucose  - Encouraged continuation of lifestyle changes including regular exercise and limiting carbohydrates to 30-45 grams per meal threes times daily and 15 grams per  snack with a limit of two daily.   - Encouraged continued monitoring of blood glucose with maintenance of 4 times daily and Continuously with personal CGM Dexcom.   - Current DM Medication Regimen: Change Lantus to 50 units daily. Change Novolog 12 units before meals - dose 15 mins prior and correction dosing every 3 hours per scale below.   - Health Maintenance standards addressed today: Lipid panel to be scheduled today and flu shot  - Nursing Visit: Patient is below goal range for nursing visit for age group and will not need nursing visit at this time .   - Follow up in 8 weeks with A1c prior. Follow up with CAROLINA Noble in 4 weeks.        IF HAVING HIGH BLOOD SUGARS:     Novolog Correction Dosing every 3 hours as needed OUTSIDE OF EATING   If BG less than 200, no extra Novolog needed  If  - 250, may take 2 units of Novolog  If  - 300, may take 4 units of Novolog  If  - 350, may take 6 units of Novolog  If  - 400, may take 8 units of Novolog    If +, may take 10 units of Novolog    Blakeney McKnight, PA-C [facility]    A total of 60 minutes was spent in face to face time, of which over 50 % was spent in counseling patient on disease process, complications, treatment, and side effects of medications.

## 2020-10-13 NOTE — PATIENT INSTRUCTIONS
CURRENT DM MEDICATIONS:    Lantus 50 units daily - do not change based on blood sugar    Novolog 12 units before meals three times daily - dose 15 mins before meals    Novolog as needed for high blood sugar 3 hours after shot    IF HAVING HIGH BLOOD SUGARS:     Novolog Correction Dosing every 3 hours as needed OUTSIDE OF EATING   If BG less than 200, no extra Novolog needed  If  - 250, may take 2 units of Novolog  If  - 300, may take 4 units of Novolog  If  - 350, may take 6 units of Novolog  If  - 400, may take 8 units of Novolog  If +, may take 10 units of Novolog

## 2020-10-13 NOTE — LETTER
October 13, 2020      Hermelindo Ash MD  [address] [place] [address]           [facility]  [address]  [place] [address]  Phone: [phone]  Fax: [phone]          Patient: Mono Bergman   MR Number: [id number]   YOB: 1945   Date of Visit: 10/13/2020       Dear Dr. Hermelindo Ash:    Thank you for referring Mono Bergman to me for evaluation. Attached you will find relevant portions of my assessment and plan of care.    If you have questions, please do not hesitate to call me. I look forward to following Mono Bergman along with you.    Sincerely,    Catalina Hagan PA-C    Enclosure  CC:  No Recipients    If you would like to receive this communication electronically, please contact [email or web address] or [phone] to request more information on [employer] Link access.    For providers and/or their staff who would like to refer a patient to [facility], please contact us through our one-stop-shop provider referral line, [facility], [place], at [phone].    If you feel you have received this communication in error or would no longer like to receive these types of communications, please e-mail [email or web address]

## 2020-11-12 ENCOUNTER — CLINICAL SUPPORT (OUTPATIENT)
Dept: DIABETES | Facility: CLINIC | Age: 75
End: 2020-11-12
Payer: MEDICARE

## 2020-11-12 ENCOUNTER — LAB VISIT (OUTPATIENT)
Dept: LAB | Facility: HOSPITAL | Age: 75
End: 2020-11-12
Attending: FAMILY MEDICINE
Payer: MEDICARE

## 2020-11-12 VITALS — HEIGHT: 68 IN | BODY MASS INDEX: 35.01 KG/M2 | WEIGHT: 231 LBS

## 2020-11-12 DIAGNOSIS — Z12.5 PROSTATE CANCER SCREENING: ICD-10-CM

## 2020-11-12 DIAGNOSIS — E11.65 UNCONTROLLED TYPE 2 DIABETES MELLITUS WITH HYPERGLYCEMIA: ICD-10-CM

## 2020-11-12 DIAGNOSIS — E78.5 DYSLIPIDEMIA ASSOCIATED WITH TYPE 2 DIABETES MELLITUS: ICD-10-CM

## 2020-11-12 DIAGNOSIS — E11.69 DYSLIPIDEMIA ASSOCIATED WITH TYPE 2 DIABETES MELLITUS: ICD-10-CM

## 2020-11-12 DIAGNOSIS — N18.30 STAGE 3 CHRONIC KIDNEY DISEASE: ICD-10-CM

## 2020-11-12 LAB
ALBUMIN SERPL BCP-MCNC: 3.3 G/DL (ref 3.5–5.2)
ALP SERPL-CCNC: 155 U/L (ref 55–135)
ALT SERPL W/O P-5'-P-CCNC: 39 U/L (ref 10–44)
ANION GAP SERPL CALC-SCNC: 8 MMOL/L (ref 8–16)
AST SERPL-CCNC: 33 U/L (ref 10–40)
BASOPHILS # BLD AUTO: 0.07 K/UL (ref 0–0.2)
BASOPHILS NFR BLD: 1 % (ref 0–1.9)
BILIRUB SERPL-MCNC: 0.7 MG/DL (ref 0.1–1)
BUN SERPL-MCNC: 23 MG/DL (ref 8–23)
CALCIUM SERPL-MCNC: 8 MG/DL (ref 8.7–10.5)
CHLORIDE SERPL-SCNC: 107 MMOL/L (ref 95–110)
CHOLEST SERPL-MCNC: 145 MG/DL (ref 120–199)
CHOLEST/HDLC SERPL: 3.7 {RATIO} (ref 2–5)
CO2 SERPL-SCNC: 28 MMOL/L (ref 23–29)
COMPLEXED PSA SERPL-MCNC: 2 NG/ML (ref 0–4)
CREAT SERPL-MCNC: 1.5 MG/DL (ref 0.5–1.4)
DIFFERENTIAL METHOD: ABNORMAL
EOSINOPHIL # BLD AUTO: 0.3 K/UL (ref 0–0.5)
EOSINOPHIL NFR BLD: 4.1 % (ref 0–8)
ERYTHROCYTE [DISTWIDTH] IN BLOOD BY AUTOMATED COUNT: 12.9 % (ref 11.5–14.5)
EST. GFR  (AFRICAN AMERICAN): 52.6 ML/MIN/1.73 M^2
EST. GFR  (NON AFRICAN AMERICAN): 45.5 ML/MIN/1.73 M^2
ESTIMATED AVG GLUCOSE: 140 MG/DL (ref 68–131)
GLUCOSE SERPL-MCNC: 129 MG/DL (ref 70–110)
HBA1C MFR BLD HPLC: 6.5 % (ref 4–5.6)
HCT VFR BLD AUTO: 36.4 % (ref 40–54)
HDLC SERPL-MCNC: 39 MG/DL (ref 40–75)
HDLC SERPL: 26.9 % (ref 20–50)
HGB BLD-MCNC: 11.7 G/DL (ref 14–18)
IMM GRANULOCYTES # BLD AUTO: 0.01 K/UL (ref 0–0.04)
IMM GRANULOCYTES NFR BLD AUTO: 0.1 % (ref 0–0.5)
LDLC SERPL CALC-MCNC: 88.8 MG/DL (ref 63–159)
LYMPHOCYTES # BLD AUTO: 2 K/UL (ref 1–4.8)
LYMPHOCYTES NFR BLD: 28.3 % (ref 18–48)
MCH RBC QN AUTO: 29.5 PG (ref 27–31)
MCHC RBC AUTO-ENTMCNC: 32.1 G/DL (ref 32–36)
MCV RBC AUTO: 92 FL (ref 82–98)
MONOCYTES # BLD AUTO: 0.7 K/UL (ref 0.3–1)
MONOCYTES NFR BLD: 10.4 % (ref 4–15)
NEUTROPHILS # BLD AUTO: 3.9 K/UL (ref 1.8–7.7)
NEUTROPHILS NFR BLD: 56.1 % (ref 38–73)
NONHDLC SERPL-MCNC: 106 MG/DL
NRBC BLD-RTO: 0 /100 WBC
PLATELET # BLD AUTO: 185 K/UL (ref 150–350)
PMV BLD AUTO: 10.1 FL (ref 9.2–12.9)
POTASSIUM SERPL-SCNC: 3.8 MMOL/L (ref 3.5–5.1)
PROT SERPL-MCNC: 6.4 G/DL (ref 6–8.4)
RBC # BLD AUTO: 3.97 M/UL (ref 4.6–6.2)
SODIUM SERPL-SCNC: 143 MMOL/L (ref 136–145)
TRIGL SERPL-MCNC: 86 MG/DL (ref 30–150)
WBC # BLD AUTO: 7 K/UL (ref 3.9–12.7)

## 2020-11-12 PROCEDURE — 84153 ASSAY OF PSA TOTAL: CPT | Mod: GA

## 2020-11-12 PROCEDURE — G0108 DIAB MANAGE TRN  PER INDIV: HCPCS | Mod: PBBFAC | Performed by: DIETITIAN, REGISTERED

## 2020-11-12 PROCEDURE — 99999 PR PBB SHADOW E&M-EST. PATIENT-LVL IV: CPT | Mod: PBBFAC,,, | Performed by: DIETITIAN, REGISTERED

## 2020-11-12 PROCEDURE — 83036 HEMOGLOBIN GLYCOSYLATED A1C: CPT

## 2020-11-12 PROCEDURE — 80053 COMPREHEN METABOLIC PANEL: CPT

## 2020-11-12 PROCEDURE — 36415 COLL VENOUS BLD VENIPUNCTURE: CPT

## 2020-11-12 PROCEDURE — 80061 LIPID PANEL: CPT

## 2020-11-12 PROCEDURE — 99999 PR PBB SHADOW E&M-EST. PATIENT-LVL IV: ICD-10-PCS | Mod: PBBFAC,,, | Performed by: DIETITIAN, REGISTERED

## 2020-11-12 PROCEDURE — 99214 OFFICE O/P EST MOD 30 MIN: CPT | Mod: PBBFAC | Performed by: DIETITIAN, REGISTERED

## 2020-11-12 PROCEDURE — 85025 COMPLETE CBC W/AUTO DIFF WBC: CPT

## 2020-11-12 NOTE — LETTER
November 12, 2020        Hermelindo Ash MD  [address] [place] [address]             [facility]  [address]  [place] [address]  Phone: [phone]  Fax: [phone]   Patient: Mono Bergman   MR Number: [id number]   YOB: 1945   Date of Visit: 11/12/2020       Dear Dr. Osiel Ash:    Thank you for referring Mono Bergman to me for evaluation. Below are the relevant portions of my assessment and plan of care.     If you have questions, please do not hesitate to call me. I look forward to following Mono along with you.    Sincerely,      Marie Noble, TAMIA, CDE           CC  Catalina Hagan PA-C

## 2020-11-12 NOTE — PROGRESS NOTES
"Diabetes Education  Author: Marie Noble RD, CDE  Date: 11/12/2020    Diabetes Care Management Summary  Diabetes Education Record Assessment/Progress: Comprehensive/Group(3/5/20 Dexcom start, assessment 8/18/20)  Current Diabetes Risk Level: Moderate     Last A1c:   Lab Results   Component Value Date    HGBA1C 6.2 (H) 05/13/2020      Diabetes Type  Diabetes Type : Type II(GFR 42.4)    Diabetes History  Diabetes Diagnosis: >10 years  Current Treatment: Diet, Exercise, Insulin(lantus 50units daily; novolog ac (pt reports taking 12units +s/s before meals).)  Reviewed Problem List with Patient: Yes    Health Maintenance was reviewed today with patient. Discussed with patient importance of routine eye exams, foot exams/foot care, blood work (i.e.: A1c, microalbumin, and lipid), dental visits, yearly flu vaccine, and pneumonia vaccine as indicated by PCP. Patient verbalized understanding.     Health Maintenance Topics with due status: Not Due       Topic Last Completion Date    Foot Exam 02/06/2020    Eye Exam 05/13/2020    Low Dose Statin 10/01/2020     Health Maintenance Due   Topic Date Due    TETANUS VACCINE  08/17/1965    Shingles Vaccine (1 of 2) 08/17/1997    PROSTATE-SPECIFIC ANTIGEN  10/03/2020    Lipid Panel  10/08/2020    Hemoglobin A1c  11/13/2020    Urine Microalbumin  01/07/2021    Colorectal Cancer Screening  01/30/2021       Nutrition  Meal Planning: (Intake ~1800cals/d. Pt limiting carb ~30-60grams/meal and ~20-30grams/snack. No excess sat fat. Higher sodium from regular freq processed deli meats. Inadequate nonstarchy vegetables, fruit. Whole grains adequate from breads.)  Meal Plan 24 Hour Recall - Breakfast: 2 biscuits - water  Meal Plan 24 Hour Recall - Lunch: sandwiches (ham on wheat) - 4" (~30gram carb) or COA meals  Meal Plan 24 Hour Recall - Dinner: sandwiches (ham on wheat) - 8" (~60gram carb)  Meal Plan 24 Hour Recall - Snack: goldfish crackers; yovanny: water    Monitoring   Self " Monitoring : Pt and caregiver didn't bring Dexcom , glucometer to clinic for download. Pt is waiting for new sensor orders and reports rare BG testing w/ glucometer. Per recall, random -150 range prior to being out of Dexcom sensors.  Blood Glucose Logs: No  In the last month, how often have you had a low blood sugar reaction?: (2x/mos, BG 50-60, tx using skittles)  Can you tell when your blood sugar is too high?: no    Exercise   Exercise Type: (chair/bed type exercise 5-10min daily)    Social History  Preferred Learning Method: Face to Face  Primary Support: Self, Caregiver  Smoking Status: Ex Smoker  Alcohol Use: Never     Barriers to Change  Barriers to Change: None  Learning Challenges : Hearing(hearing loss)    Readiness to Learn   Readiness to Learn : Acceptance    Cultural Influences  Cultural Influences: No    Diabetes Education Assessment/Progress  Diabetes Disease Process (diabetes disease process and treatment options): Demonstrates Understanding/Competency(verbalizes/demonstrates)  Nutrition (Incorporating nutritional management into one's lifestyle): Discussion, Individual Session, Demonstrates Understanding/Competency (verbalizes/demonstrates)  Physical Activity (incorporating physical activity into one's lifestyle): Discussion, Individual Session, Demonstrates Understanding/Competency (verbalizes/demonstrates)  Medications (states correct name, dose, onset, peak, duration, side effects & timing of meds): Discussion, Individual Session, Demonstrates Understanding/Competency(verbalizes/demonstrates), Written Materials Provided  Monitoring (monitoring blood glucose/other parameters & using results): Discussion, Individual Session, Demonstrates Understanding/Competency (verbalizes/demonstrates)  Acute Complications (preventing, detecting, and treating acute complications): Discussion, Individual Session, Demonstrates Understanding/Competency (verbalizes/demonstrates)  Chronic Complications  (preventing, detecting, and treating chronic complications): Demonstrates Understanding/Competency (verbalizes/demonstrates)  Clinical (diabetes, other pertinent medical history, and relevant comorbidities reviewed during visit): Demonstrates Understanding/Competency (verbalizes/demonstrates)  Cognitive (knowledge of self-management skills, functional health literacy): Discussion, Individual Session, Demonstrates Understanding/Competency (verbalizes/demonstrates)  Psychosocial (emotional response to diabetes): Not Covered/Deferred  Diabetes Distress and Support Systems: Not Covered/Deferred  Behavioral (readiness for change, lifestyle practices, self-care behaviors): Discussion, Individual Session, Demonstrates Understanding/Competency (verbalizes/demonstrates)    Goals  Patient has selected/evaluated goals during today's session: Yes, evaluated  Healthy Eating: Set(food variety -CO[employer] Meals on Wheels, use healthy MR entrees (list provided))  Met Percentage : 50%  Start Date: 11/12/20  Target Date: 01/26/21  Target Date: 01/26/21  Monitoring: Set(use Dexcom as directed)  Met Percentage : 50%  Start Date: 11/12/20(bring Dexcom, glucometer to clinic visits; use back-up glucometer (test fst, ac, bed) when unable use Dexcom)  Target Date: 01/26/21  Medications: Set(dose diabetes medications as directed)  Met Percentage : 75%  Start Date: 11/12/20  Target Date: 01/26/21     Diabetes Care Plan/Intervention  Education Plan/Intervention: Individual Follow-Up DSMT(CAITLYN w/ Javad, Dr. Osiel Ash for medical mgmt.) RTC ~2mos rotating w/ Sarath; prn or as referred.    Diabetes Meal Plan  Restrictions: Low Fat, Low Sodium, Restricted Carbohydrate  Calories: 1500  Carbohydrate Per Meal: 30-45g  Carbohydrate Per Snack : 7-15g    Today's Self-Management Care Plan was developed with the patient's input and is based on barriers identified during today's assessment.    The long and short-term goals in the care plan were written  with the patient/caregiver's input. The patient has agreed to work toward these goals to improve his overall diabetes control.      The patient received a copy of today's self-management plan and verbalized understanding of the care plan, goals, and all of today's instructions.      The patient was encouraged to communicate with his physician and care team regarding his condition(s) and treatment.  I provided the patient with my contact information today and encouraged him to contact me via phone or patient portal as needed.     Education Units of Time   Time Spent: 60 min

## 2020-11-18 ENCOUNTER — PROCEDURE VISIT (OUTPATIENT)
Dept: OPHTHALMOLOGY | Facility: CLINIC | Age: 75
End: 2020-11-18
Payer: MEDICARE

## 2020-11-18 DIAGNOSIS — E11.3513 PROLIFERATIVE DIABETIC RETINOPATHY OF BOTH EYES WITH MACULAR EDEMA ASSOCIATED WITH TYPE 2 DIABETES MELLITUS: Primary | ICD-10-CM

## 2020-11-18 PROCEDURE — 92014 COMPRE OPH EXAM EST PT 1/>: CPT | Mod: 25,S$PBB,, | Performed by: OPHTHALMOLOGY

## 2020-11-18 PROCEDURE — 67028 INJECTION EYE DRUG: CPT | Mod: S$PBB,RT,, | Performed by: OPHTHALMOLOGY

## 2020-11-18 PROCEDURE — 67028 PR INJECT INTRAVITREAL PHARMCOLOGIC: ICD-10-PCS | Mod: S$PBB,RT,, | Performed by: OPHTHALMOLOGY

## 2020-11-18 PROCEDURE — 92134 POSTERIOR SEGMENT OCT RETINA (OCULAR COHERENCE TOMOGRAPHY)-BOTH EYES: ICD-10-PCS | Mod: 26,S$PBB,, | Performed by: OPHTHALMOLOGY

## 2020-11-18 PROCEDURE — 67028 INJECTION EYE DRUG: CPT | Mod: PBBFAC,RT | Performed by: OPHTHALMOLOGY

## 2020-11-18 PROCEDURE — 92014 PR EYE EXAM, EST PATIENT,COMPREHESV: ICD-10-PCS | Mod: 25,S$PBB,, | Performed by: OPHTHALMOLOGY

## 2020-11-18 PROCEDURE — 92134 CPTRZ OPH DX IMG PST SGM RTA: CPT | Mod: PBBFAC | Performed by: OPHTHALMOLOGY

## 2020-11-18 RX ADMIN — AFLIBERCEPT 2 MG: 40 INJECTION, SOLUTION INTRAVITREAL at 11:11

## 2020-11-18 NOTE — PROGRESS NOTES
===============================  Mono Bergman,  11/18/2020 today   75 y.o. male   Last visit [facility]: :10/1/2020   Last visit eye dept. 10/1/2020  [facility]:  Uncorrected distance visual acuity was 20/400 in the right eye and 20/400 in the left eye.  Tonometry     Tonometry (Applanation, 11:39 AM)       Right Left    Pressure 17 17              Wearing Rx     Wearing Rx       Sphere Cylinder Axis Add    Right [place] +0.50 025 +2.50    Left [place] +0.50 030 +2.50    Type: Bifocal              Manifest Refraction     Manifest Refraction       Sphere Cylinder Axis Add    Right [place] +0.50 025 +2.50    Left [place] +0.50 030 +2.50               Not recorded        Chief Complaint   Patient presents with    Macular Degeneration     eylea od     Ophthalmic Medications     Ophthalmic - Anti-inflammatory, Glucocorticoids Start End     prednisoLONE acetate (PRED FORTE) 1 % DrpS    10/1/2020 11/30/2020    Sig: Place 1 drop into both eyes 4 (four) times daily.    Route: Both Eyes    Ophthalmic - Anti-inflammatory, NSAIDs Start End     ketorolac 0.5% (ACULAR) 0.5 % Drop    10/1/2020 10/1/2021    Sig: Place 1 drop into both eyes 4 (four) times daily.    Route: Both Eyes        ________________  11/18/2020 today  HPI     Macular Degeneration      Additional comments: eylea od              Comments     1.) + DM SINCE 1997  2.) + PDR OU/ CSME OS  Bilateral PRP  PPV OS with Dr. 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 TEARS BID OS  REFRESH GEL BID OS   OMEGA E BID P.O.    Eylea od last  10/1/20  Eylea os 12/5/19          Last edited by Laya Carvalho on 11/18/2020 10:46 AM. 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  od better but persistent csme  Good iop today on pred forte and ketorolac  Recommend Eylea OD again today  Next visit give ozurdex od  Discontinue pred forte and ketorolac  ..    Injection Procedure Note:    11/18/2020  Diagnosis :  dme od  Today:   Eylea (afibercept) 2 mg/0.05 ml Intravitreal Injection , OD   Follow up: 1 month, iop check, if iop ok ozurdex od    Instructed to call 24/7 for any worsening of vision. Check Both eyes daily. Gave patient my home phone number.  Risks, benefits, and alternatives to treatment discussed in detail with the patient.  The patient voiced understanding and wished to proceed with the procedure.     Patient Identified and Time Out complete  Subconjunctival bleb - xylocaine with epi 2%   and Betadine.  Inject at Eylea (afibercept) 2 mg/0.05 ml Intravitreal Injection , OD 6:00 @ 3.5-4mm posterior to limbus  1 stop: na   Post Operative Dx: Same  Complications: None  Follow up as above.      ===========================

## 2020-11-24 ENCOUNTER — TELEPHONE (OUTPATIENT)
Dept: OPHTHALMOLOGY | Facility: CLINIC | Age: 75
End: 2020-11-24

## 2020-11-24 NOTE — TELEPHONE ENCOUNTER
Will leave message for Cristina, she will be back in the office tomorrow.  ----- Message from Karly Mckeon sent at 11/24/2020  3:36 PM CST -----  Regarding: self  Crissy gordon care giver  Patient is returning a phone call.  Who left a message for the patient: Not sure   Does patient know what this is regarding: Yes   Comments:

## 2020-12-03 ENCOUNTER — OFFICE VISIT (OUTPATIENT)
Dept: INTERNAL MEDICINE | Facility: CLINIC | Age: 75
End: 2020-12-03
Payer: MEDICARE

## 2020-12-03 DIAGNOSIS — E11.3513 TYPE 2 DIABETES MELLITUS WITH BOTH EYES AFFECTED BY PROLIFERATIVE RETINOPATHY AND MACULAR EDEMA, WITH LONG-TERM CURRENT USE OF INSULIN: ICD-10-CM

## 2020-12-03 DIAGNOSIS — E78.5 DYSLIPIDEMIA ASSOCIATED WITH TYPE 2 DIABETES MELLITUS: ICD-10-CM

## 2020-12-03 DIAGNOSIS — E11.59 HYPERTENSION ASSOCIATED WITH DIABETES: Primary | ICD-10-CM

## 2020-12-03 DIAGNOSIS — Z79.4 TYPE 2 DIABETES MELLITUS WITH BOTH EYES AFFECTED BY PROLIFERATIVE RETINOPATHY AND MACULAR EDEMA, WITH LONG-TERM CURRENT USE OF INSULIN: ICD-10-CM

## 2020-12-03 DIAGNOSIS — I15.2 HYPERTENSION ASSOCIATED WITH DIABETES: Primary | ICD-10-CM

## 2020-12-03 DIAGNOSIS — E11.69 DYSLIPIDEMIA ASSOCIATED WITH TYPE 2 DIABETES MELLITUS: ICD-10-CM

## 2020-12-03 DIAGNOSIS — D64.9 ANEMIA, UNSPECIFIED TYPE: ICD-10-CM

## 2020-12-03 DIAGNOSIS — N18.31 STAGE 3A CHRONIC KIDNEY DISEASE: ICD-10-CM

## 2020-12-03 PROCEDURE — 99214 OFFICE O/P EST MOD 30 MIN: CPT | Mod: 95,,, | Performed by: FAMILY MEDICINE

## 2020-12-03 PROCEDURE — 99214 PR OFFICE/OUTPT VISIT, EST, LEVL IV, 30-39 MIN: ICD-10-PCS | Mod: 95,,, | Performed by: FAMILY MEDICINE

## 2020-12-03 RX ORDER — CALCIUM CARBONATE 500(1250)
1 TABLET ORAL DAILY
Qty: 90 TABLET | Refills: 3 | Status: SHIPPED | OUTPATIENT
Start: 2020-12-03 | End: 2020-12-03 | Stop reason: SDUPTHER

## 2020-12-03 RX ORDER — CALCIUM CARBONATE 500(1250)
1 TABLET ORAL DAILY
Qty: 90 TABLET | Refills: 3 | Status: SHIPPED | OUTPATIENT
Start: 2020-12-03 | End: 2023-08-31

## 2020-12-03 NOTE — PROGRESS NOTES
Subjective:      Patient ID: Mono Bergman is a 75 y.o. male.    Chief Complaint:  F/U chronic conditions    HPI  74 yo male presents via telemed for check on labs/meds.  He is doing very well on PPI, stomach issue are resolved.  I had recommended PPI x 3 mos//can cont if unable to stop.  His BG is doing well  He literally eats subway sandwiches for 3 meals a day.  Does get meals on wheels so occ will eat one of those.  Feeling well at this time.  [facility]  The patient location is: Home  The chief complaint leading to consultation is: F/U chronic conditions    Visit type: audiovisual    Face to Face time with patient: 15 ins   minutes of total time spent on the encounter, which includes face to face time and non-face to face time preparing to see the patient (eg, review of tests), Obtaining and/or reviewing separately obtained history, Documenting clinical information in the electronic or other health record, Independently interpreting results (not separately reported) and communicating results to the patient/family/caregiver, or Care coordination (not separately reported).         Each patient to whom he or she provides medical services by telemedicine is:  (1) informed of the relationship between the physician and patient and the respective role of any other health care provider with respect to management of the patient; and (2) notified that he or she may decline to receive medical services by telemedicine and may withdraw from such care at any time.    Notes:     Past Medical History:   Diagnosis Date    Arthritis     Cataract     Colon polyps 10/29/13    Diabetes mellitus type II     Diabetic retinopathy     GERD (gastroesophageal reflux disease) 7/18/2013    Hyperlipidemia LDL goal < 70 7/18/2013    Hypertension     Low BP    JOEL on CPAP     Uveitis 5/11/2016     Family History   Problem Relation Age of Onset    Heart disease Mother     Colon cancer Neg Hx      Past  Surgical History:   Procedure Laterality Date    CATARACT EXTRACTION W/  INTRAOCULAR LENS IMPLANT Left 12/10/14    CATARACT EXTRACTION W/  INTRAOCULAR LENS IMPLANT Right 07/13/2016    CHOLECYSTECTOMY      COLONOSCOPY N/A 2/3/2017    Procedure: COLONOSCOPY;  Surgeon: Natan Magdaleno MD;  Location: [facility];  Service: Endoscopy;  Laterality: N/A;    EYE SURGERY      KNEE SURGERY  1971    left knee    rk rt. eye       Social History     Tobacco Use    Smoking status: Never Smoker    Smokeless tobacco: Never Used   Substance Use Topics    Alcohol use: No    Drug use: No       There were no vitals taken for this visit.    Review of Systems   Constitutional: Negative for activity change and unexpected weight change.   HENT: Negative for hearing loss, rhinorrhea and trouble swallowing.    Eyes: Negative for discharge and visual disturbance.   Respiratory: Negative for chest tightness and wheezing.    Cardiovascular: Negative for chest pain and palpitations.   Gastrointestinal: Negative for blood in stool, constipation, diarrhea and vomiting.   Endocrine: Negative for polydipsia and polyuria.   Genitourinary: Negative for difficulty urinating, hematuria and urgency.   Musculoskeletal: Negative for arthralgias, joint swelling and neck pain.   Neurological: Negative for weakness and headaches.   Psychiatric/Behavioral: Negative for confusion and dysphoric mood.       Objective:     Physical Exam  Constitutional:       General: He is not in acute distress.     Appearance: He is well-developed. He is not diaphoretic.   HENT:      Head: Normocephalic and atraumatic.   Eyes:      General:         Right eye: No discharge.         Left eye: No discharge.   Pulmonary:      Effort: Pulmonary effort is normal. No respiratory distress.   Neurological:      Mental Status: He is alert and oriented to person, place, and time.   Psychiatric:         Mood and Affect: Mood normal.         Behavior: Behavior normal.          Thought Content: Thought content normal.         Judgment: Judgment normal.         Lab Results   Component Value Date    WBC 7.00 11/12/2020    HGB 11.7 (L) 11/12/2020    HCT 36.4 (L) 11/12/2020     11/12/2020    CHOL 145 11/12/2020    TRIG 86 11/12/2020    HDL 39 (L) 11/12/2020    ALT 39 11/12/2020    AST 33 11/12/2020     11/12/2020    K 3.8 11/12/2020     11/12/2020    CREATININE 1.5 (H) 11/12/2020    BUN 23 11/12/2020    CO2 28 11/12/2020    PSA 2.0 11/12/2020    INR 1.0 05/01/2017    HGBA1C 6.5 (H) 11/12/2020       Assessment:     1. Hypertension associated with diabetes    2. Type 2 diabetes mellitus with both eyes affected by proliferative retinopathy and macular edema, with long-term current use of insulin    3. Dyslipidemia associated with type 2 diabetes mellitus    4. Stage 3a chronic kidney disease    5. Anemia, unspecified type         Plan:     Hypertension associated with diabetes    Type 2 diabetes mellitus with both eyes affected by proliferative retinopathy and macular edema, with long-term current use of insulin    Dyslipidemia associated with type 2 diabetes mellitus    Stage 3a chronic kidney disease    Anemia, unspecified type  -     Ferritin; Future; Expected date: 12/03/2020  -     Iron and TIBC; Future; Expected date: 12/03/2020    Other orders  -     Discontinue: calcium carbonate (OS-JAMES) 500 mg calcium (1,250 mg) tablet; Take 1 tablet (500 mg total) by mouth once daily.  Dispense: 90 tablet; Refill: 3  -     calcium carbonate (OS-JAMES) 500 mg calcium (1,250 mg) tablet; Take 1 tablet (500 mg total) by mouth once daily.  Dispense: 90 tablet; Refill: 3      Reviewed labs  DM well controlled  Lipids controlled  Cont meds  Anemic//check labs  Had Cscope 1/20 and pt reports he was told he will not need any further.  Low calcium//start daily supplement//will monitor  Will check iron levels/talk about replacing levels.  Consider occult stool with his recent GERD issues.    F/u  likely 3-6 mos//will decide after lab review

## 2020-12-09 ENCOUNTER — PATIENT OUTREACH (OUTPATIENT)
Dept: ADMINISTRATIVE | Facility: OTHER | Age: 75
End: 2020-12-09

## 2020-12-10 ENCOUNTER — LAB VISIT (OUTPATIENT)
Dept: LAB | Facility: HOSPITAL | Age: 75
End: 2020-12-10
Attending: FAMILY MEDICINE
Payer: MEDICARE

## 2020-12-10 ENCOUNTER — OFFICE VISIT (OUTPATIENT)
Dept: PODIATRY | Facility: CLINIC | Age: 75
End: 2020-12-10
Payer: MEDICARE

## 2020-12-10 VITALS — BODY MASS INDEX: 35.02 KG/M2 | WEIGHT: 231.06 LBS | HEIGHT: 68 IN

## 2020-12-10 DIAGNOSIS — D64.9 ANEMIA, UNSPECIFIED TYPE: ICD-10-CM

## 2020-12-10 DIAGNOSIS — E11.42 DM TYPE 2 WITH DIABETIC PERIPHERAL NEUROPATHY: ICD-10-CM

## 2020-12-10 DIAGNOSIS — M79.675 PAIN DUE TO ONYCHOMYCOSIS OF TOENAILS OF BOTH FEET: ICD-10-CM

## 2020-12-10 DIAGNOSIS — E11.65 UNCONTROLLED TYPE 2 DIABETES MELLITUS WITH HYPERGLYCEMIA: Primary | ICD-10-CM

## 2020-12-10 DIAGNOSIS — M79.674 PAIN DUE TO ONYCHOMYCOSIS OF TOENAILS OF BOTH FEET: ICD-10-CM

## 2020-12-10 DIAGNOSIS — B35.1 PAIN DUE TO ONYCHOMYCOSIS OF TOENAILS OF BOTH FEET: ICD-10-CM

## 2020-12-10 DIAGNOSIS — B35.1 DERMATOPHYTOSIS OF NAIL: ICD-10-CM

## 2020-12-10 PROCEDURE — 99999 PR PBB SHADOW E&M-EST. PATIENT-LVL III: ICD-10-PCS | Mod: PBBFAC,,, | Performed by: PODIATRIST

## 2020-12-10 PROCEDURE — 36415 COLL VENOUS BLD VENIPUNCTURE: CPT

## 2020-12-10 PROCEDURE — 99213 OFFICE O/P EST LOW 20 MIN: CPT | Mod: PBBFAC,25 | Performed by: PODIATRIST

## 2020-12-10 PROCEDURE — 11720 DEBRIDE NAIL 1-5: CPT | Mod: 59,Q9,S$PBB, | Performed by: PODIATRIST

## 2020-12-10 PROCEDURE — 11720 DEBRIDE NAIL 1-5: CPT | Mod: Q9,PBBFAC | Performed by: PODIATRIST

## 2020-12-10 PROCEDURE — 99999 PR PBB SHADOW E&M-EST. PATIENT-LVL III: CPT | Mod: PBBFAC,,, | Performed by: PODIATRIST

## 2020-12-10 PROCEDURE — 83540 ASSAY OF IRON: CPT

## 2020-12-10 PROCEDURE — 11719 TRIM NAIL(S) ANY NUMBER: CPT | Mod: Q9,S$PBB,, | Performed by: PODIATRIST

## 2020-12-10 PROCEDURE — 99499 NO LOS: ICD-10-PCS | Mod: S$PBB,,, | Performed by: PODIATRIST

## 2020-12-10 PROCEDURE — 11719 PR TRIM NAIL(S): ICD-10-PCS | Mod: Q9,S$PBB,, | Performed by: PODIATRIST

## 2020-12-10 PROCEDURE — 11720 PR DEBRIDEMENT OF NAIL(S), 1-5: ICD-10-PCS | Mod: 59,Q9,S$PBB, | Performed by: PODIATRIST

## 2020-12-10 PROCEDURE — 11719 TRIM NAIL(S) ANY NUMBER: CPT | Mod: PBBFAC | Performed by: PODIATRIST

## 2020-12-10 PROCEDURE — 82728 ASSAY OF FERRITIN: CPT

## 2020-12-10 PROCEDURE — 99499 UNLISTED E&M SERVICE: CPT | Mod: S$PBB,,, | Performed by: PODIATRIST

## 2020-12-10 NOTE — PROGRESS NOTES
[facility]  PODIATRIC MEDICINE AND SURGERY      CHIEF COMPLAINT  Chief Complaint   Patient presents with    Diabetic Foot Exam     PCP Dr. Ash 12/03/20 DFE         HPI    SUBJECTIVE: Mono Bergman is a 75 y.o. male who  has a past medical history of Arthritis, Cataract, Colon polyps (10/29/13), Diabetes mellitus type II, Diabetic retinopathy, GERD (gastroesophageal reflux disease) (7/18/2013), Hyperlipidemia LDL goal < 70 (7/18/2013), Hypertension, JOEL on CPAP, and Uveitis (5/11/2016). Monopresents to clinic  He does wear custom DM shoes and inserts. He complains about painful elongated toenails. Pain is worse in enclosed shoe wear and ambulation. he requests treatment. Patient has no other pedal complaints at this time      HgA1c:   Hemoglobin A1C   Date Value Ref Range Status   11/12/2020 6.5 (H) 4.0 - 5.6 % Final     Comment:     ADA Screening Guidelines:  5.7-6.4%  Consistent with prediabetes  >or=6.5%  Consistent with diabetes  High levels of fetal hemoglobin interfere with the HbA1C  assay. Heterozygous hemoglobin variants (HbS, HgC, etc)do  not significantly interfere with this assay.   However, presence of multiple variants may affect accuracy.     05/13/2020 6.2 (H) 4.0 - 5.6 % Final     Comment:     ADA Screening Guidelines:  5.7-6.4%  Consistent with prediabetes  >or=6.5%  Consistent with diabetes  High levels of fetal hemoglobin interfere with the HbA1C  assay. Heterozygous hemoglobin variants (HbS, HgC, etc)do  not significantly interfere with this assay.   However, presence of multiple variants may affect accuracy.     02/06/2020 7.6 (H) 4.0 - 5.6 % Final     Comment:     ADA Screening Guidelines:  5.7-6.4%  Consistent with prediabetes  >or=6.5%  Consistent with diabetes  High levels of fetal hemoglobin interfere with the HbA1C  assay. Heterozygous hemoglobin variants (HbS, HgC, etc)do  not significantly interfere with this assay.   However, presence of multiple  "variants may affect accuracy.           [facility]  Past Medical History:   Diagnosis Date    Arthritis     Cataract     Colon polyps 10/29/13    Diabetes mellitus type II     Diabetic retinopathy     GERD (gastroesophageal reflux disease) 7/18/2013    Hyperlipidemia LDL goal < 70 7/18/2013    Hypertension     Low BP    JOEL on CPAP     Uveitis 5/11/2016       MEDS  Current Outpatient Medications on File Prior to Visit   Medication Sig Dispense Refill    antiox #8/om3/dha/epa/lut/zeax (PRESERVISION AREDS 2, OMEGA-3, ORAL) Take 1 tablet by mouth once daily.      ARGININE, L-ARGININE, ORAL Take by mouth.      aspirin 81 mg Tab Take 81 mg by mouth before breakfast.      atorvastatin (LIPITOR) 80 MG tablet Take 40 mg by mouth once daily.      blood-glucose meter,continuous (DEXCOM G6 ) Misc Use per 's instructions 1 each 0    blood-glucose sensor (DEXCOM G6 SENSOR) Calista Use one per 10 days  per 's instructions 1 Device 11    blood-glucose transmitter (DEXCOM G6 TRANSMITTER) Calista Use per 's instructions 1 Device 3    calcium carbonate (OS-JAMES) 500 mg calcium (1,250 mg) tablet Take 1 tablet (500 mg total) by mouth once daily. 90 tablet 3    cyanocobalamin (VITAMIN B-12) 1000 MCG tablet Take 100 mcg by mouth once daily.      docusate sodium (COLACE) 100 MG capsule Take 100 mg by mouth 2 (two) times daily.      ibuprofen (ADVIL,MOTRIN) 200 MG tablet Take 400 mg by mouth daily as needed for Pain.      insulin aspart U-100 (NOVOLOG FLEXPEN U-100 INSULIN) 100 unit/mL (3 mL) InPn pen 8 UNITS WITH BREAKFAST 12 UNITS WITH LUNCH 12 UNITS WITH DINNER 15 mL 0    insulin glargine (LANTUS) 100 unit/mL injection Inject 50 Units into the skin once daily.      insulin needles, disposable, (SURE-FINE PEN NEEDLES) 31 x 3/16 " Ndle Use twice daily to inject insulin 100 each 3    ketorolac 0.5% (ACULAR) 0.5 % Drop Place 1 drop into both eyes 4 (four) times daily. 10 mL 1    " "L.acidophilus-B.bifidum&longum 150 mg (3 billion cell) Chew Take 1 tablet by mouth.      metoclopramide HCl (REGLAN) 10 MG tablet Take 10 mg by mouth once daily.      pantoprazole (PROTONIX) 40 MG tablet Take 1 tablet (40 mg total) by mouth once daily. 30 tablet 3    pen needle, diabetic (BD INSULIN PEN NEEDLE UF MINI) 31 gauge x 3/16" Ndle       polyethylene glycol (GLYCOLAX) 17 gram/dose powder polyethylene glycol 3350 17 gram/dose oral powder      promethazine (PHENERGAN) 12.5 MG Tab Take 12.5 mg by mouth.      propranolol HCl (PROPRANOLOL ORAL) Take 20 mg by mouth once daily.      simethicone (MYLICON) 80 MG chewable tablet simethicone 80 mg chewable tablet   Take by oral route.      acetaZOLAMIDE (DIAMOX SEQUELS) 500 mg CpSR Take 1 capsule (500 mg total) by mouth 2 (two) times daily. 60 capsule 1    midodrine (PROAMATINE) 5 MG Tab Take 1 tablet (5 mg total) by mouth every 12 (twelve) hours. 180 tablet 1     No current facility-administered medications on file prior to visit.        H     Past Surgical History:   Procedure Laterality Date    CATARACT EXTRACTION W/  INTRAOCULAR LENS IMPLANT Left 12/10/14    CATARACT EXTRACTION W/  INTRAOCULAR LENS IMPLANT Right 07/13/2016    CHOLECYSTECTOMY      COLONOSCOPY N/A 2/3/2017    Procedure: COLONOSCOPY;  Surgeon: Natan Magdaleno MD;  [facility];  Service: Endoscopy;  Laterality: N/A;    EYE SURGERY      KNEE SURGERY  1971    left knee    rk rt. eye          ALL  Review of patient's allergies indicates:  No Known Allergies    SOC     Social History     Tobacco Use    Smoking status: Never Smoker    Smokeless tobacco: Never Used   Substance Use Topics    Alcohol use: No    Drug use: No         Family HX    Family History   Problem Relation Age of Onset    Heart disease Mother     Colon cancer Neg Hx             REVIEW OF SYSTEMS  General: Denies any fever or chills  Chest: Denies shortness of breath, wheezing, coughing, or sputum " "production  Heart: Denies chest pain.  As noted above and per history of current illness above, otherwise negative in the remainder of the 14 systems.     PHYSICAL EXAM  Vitals:    12/10/20 1139   Weight: 104.8 kg (231 lb 0.7 oz)   Height: 5' 8" (1.727 m)       GEN:  This patient is well-developed, well-nourished and appears stated age, well-oriented to person, place and time, and cooperative and pleasant on today's visit.      LOWER EXTREMITY PHYSICAL EXAMINATION   VASCULAR  DP pedal pulse 2/4 RIGHT, LEFT2/4   PT pedal pulse 2/4 RIGHT, LEFT2/4  Capillary refill time immediate to the toes.   Feet are warm to the touch. Skin temperature warm to warm from proximally to distally   There are no varicosities, telangiectasias noted to bilateral foot and ankle regions.   There are n oecchymoses noted to bilateral foot and ankle regions.   There is no gross lower extremity edema.    DERMATOLOGIC  Skin moist with healthy texture and turgor.  Thickened, dystrophic, elongated toenails with subungal debris 1 right foot, remaining nails are elongated but not mycotic  There are no open ulcerations, lacerations, or fissures to bilateral foot and ankle regions. There are no signs of infection as there is no erythema, no proximal-extending lymphangiitis, no fluctuance, or crepitus noted on palpation to bilateral foot and ankle regions.  Interim I am about activities  There is no interdigital maceration.   There are hyperkeratotic lesions noted to feet.     NEUROLOGIC  Protective sensation intact at 0/10 sites upon examination with [place] Weinsten 5.07 g monofilament.  Propioception intact at 1st MTPJ b/l.  Babinski reflex absent    ORTHOPEDIC/BIOMECHANICAL  No symptomatic structural abnormalities noted  Muscle strength AT/EHL/EDL/PT: 5/5; Achilles/Gastroc/Soleus: 5/5; PB/PL: 5/5 Muscle tone is normal.  Ankle joint ROM limited DF/PF, non-crepitus  Limb length discrepancy, left shorter than right    ASSESSMENT  Encounter Diagnoses "   Name Primary?    Uncontrolled type 2 diabetes mellitus with hyperglycemia Yes    DM type 2 with diabetic peripheral neuropathy     Dermatophytosis of nail     Pain due to onychomycosis of toenails of both feet          Plan:  -Initial patient evaluation with H&P was performed  -Discuss presenting problems, etiology, pathologic processes and management options with patient today.   -I counseled the patient on their conditions, their implications and medical management. An in depth discussion on diabetic management, risk prevention, amputation prevention verbally and provided educational literature in written format.    -With patient's permission, the elongated onychomycotic toenails, as outlined in the physical examination, were sharply debrided with a double action nail nipper to their soft tissue attachment. If indicated, the nails were then smoothed down in thickness with an dave board or dremmel to facilitate in further debridement removing all offending nail and subungual debris.  The elongated nails, as outlined in the objective portion of this note, were trimmed to appropriate length, with a double action nail nipper, for alleviation/reduction of pains as well. Patient relates relief following the procedure.     - Shoe inspection. Diabetic Foot Education. Patient reminded of the importance of good nutrition and blood sugar control to help prevent podiatric complications of diabetes. Patient instructed on proper foot hygeine. We discussed wearing proper shoe gear, daily foot inspections, never walking without protective shoe gear, never putting sharp instruments to feet, routine podiatric visits annually/semi annually or sooner if symptoms arise    Disclaimer: This note was partially prepared using a voice recognition system and is likely to have sound alike errors within the text.        Future Appointments   Date Time Provider Department Center   12/21/2020 10:45 AM VALERIANO Tucker MD [facility] OPHTHAL  [place]   12/22/2020 12:00 PM Catalina Hagan PA-C [facility] DIABETE [place]   1/26/2021 11:30 AM Marie Noble RD, CDE [facility] DIBEDU [place]       Report Electronically Signed By:     Shari Lynn DPM   Podiatry  [facility]  12/10/2020

## 2020-12-11 ENCOUNTER — PATIENT MESSAGE (OUTPATIENT)
Dept: OTHER | Facility: OTHER | Age: 75
End: 2020-12-11

## 2020-12-11 LAB
FERRITIN SERPL-MCNC: 62 NG/ML (ref 20–300)
IRON SERPL-MCNC: 73 UG/DL (ref 45–160)
SATURATED IRON: 24 % (ref 20–50)
TOTAL IRON BINDING CAPACITY: 299 UG/DL (ref 250–450)
TRANSFERRIN SERPL-MCNC: 202 MG/DL (ref 200–375)

## 2020-12-21 ENCOUNTER — PROCEDURE VISIT (OUTPATIENT)
Dept: OPHTHALMOLOGY | Facility: CLINIC | Age: 75
End: 2020-12-21
Payer: MEDICARE

## 2020-12-21 DIAGNOSIS — E11.3513 PROLIFERATIVE DIABETIC RETINOPATHY OF BOTH EYES WITH MACULAR EDEMA ASSOCIATED WITH TYPE 2 DIABETES MELLITUS: Primary | ICD-10-CM

## 2020-12-21 PROCEDURE — 67028 PR INJECT INTRAVITREAL PHARMCOLOGIC: ICD-10-PCS | Mod: S$PBB,RT,, | Performed by: OPHTHALMOLOGY

## 2020-12-21 PROCEDURE — 92134 CPTRZ OPH DX IMG PST SGM RTA: CPT | Mod: PBBFAC | Performed by: OPHTHALMOLOGY

## 2020-12-21 PROCEDURE — 67028 INJECTION EYE DRUG: CPT | Mod: PBBFAC,RT | Performed by: OPHTHALMOLOGY

## 2020-12-21 PROCEDURE — 99499 NO LOS: ICD-10-PCS | Mod: S$PBB,,, | Performed by: OPHTHALMOLOGY

## 2020-12-21 PROCEDURE — 99499 UNLISTED E&M SERVICE: CPT | Mod: S$PBB,,, | Performed by: OPHTHALMOLOGY

## 2020-12-21 PROCEDURE — 92134 POSTERIOR SEGMENT OCT RETINA (OCULAR COHERENCE TOMOGRAPHY)-BOTH EYES: ICD-10-PCS | Mod: 26,S$PBB,, | Performed by: OPHTHALMOLOGY

## 2020-12-21 PROCEDURE — 67028 INJECTION EYE DRUG: CPT | Mod: S$PBB,RT,, | Performed by: OPHTHALMOLOGY

## 2020-12-21 RX ADMIN — DEXAMETHASONE 0.7 MG: 0.7 IMPLANT INTRAVITREAL at 12:12

## 2020-12-21 NOTE — PROGRESS NOTES
===============================  Mono Bergman,  12/21/2020 today   75 y.o. male   Last visit [facility]: :11/18/2020   Last visit eye dept. 11/18/2020  [facility]:  Uncorrected distance visual acuity was 20/200 in the right eye and 20/200 in the left eye.  Tonometry     Tonometry (Applanation, 11:13 AM)       Right Left    Pressure 13 23               Not recorded         Not recorded         Not recorded        Chief Complaint   Patient presents with    Macular Degeneration     Ozurdex od     Ophthalmic Medications     Ophthalmic - Anti-inflammatory, Glucocorticoids Start End     dexamethasone (ozurdex) 0.7 mg io implant (Completed)    12/21/2020 12/21/2020     0.7 mg, Intravitreal, Clinic/HOD 1 time    Ophthalmic - Anti-inflammatory, NSAIDs Start End     ketorolac 0.5% (ACULAR) 0.5 % Drop    10/1/2020 10/1/2021    Sig: Place 1 drop into both eyes 4 (four) times daily.    Route: Both Eyes        ________________  12/21/2020 today  HPI     Macular Degeneration      Additional comments: Ozurdex od              Comments     1.) + DM SINCE 1997  2.) + PDR OU/ CSME OS  Bilateral PRP  PPV OS with Dr. 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 TEARS BID OS  REFRESH GEL BID OS   OMEGA E BID P.O.    Eylea od last  11-18-20  Eylea os 12/5/19          Last edited by Laya Carvalho on 12/21/2020 11:00 AM. 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      ===============================  12/21/2020  Diagnosis :  od  dme  Today:   Ozurdex 0.7 mg , OD   Follow up: rtc  1mo for IOP check     Instructed to call 24/7 for any worsening of vision. Check Both eyes daily. Gave patient my home phone number.  Risks, benefits, and alternatives to treatment discussed in detail with the patient.  The patient voiced understanding and wished to proceed with the procedure    Ozurdex intravitreal implant Procedure Note:     Patient Identified and Time Out complete  Subconjunctival bleb - xylocaine with epi 2%   and Betadine.  Inject OD at 1mm parallel to limbus  Post Operative Dx: Same  Complications: None  Follow up as above.

## 2021-01-25 ENCOUNTER — PATIENT OUTREACH (OUTPATIENT)
Dept: ADMINISTRATIVE | Facility: OTHER | Age: 76
End: 2021-01-25

## 2021-01-26 ENCOUNTER — TELEPHONE (OUTPATIENT)
Dept: DIABETES | Facility: CLINIC | Age: 76
End: 2021-01-26

## 2021-02-05 ENCOUNTER — TELEPHONE (OUTPATIENT)
Dept: INTERNAL MEDICINE | Facility: CLINIC | Age: 76
End: 2021-02-05

## 2021-02-18 ENCOUNTER — OFFICE VISIT (OUTPATIENT)
Dept: INTERNAL MEDICINE | Facility: CLINIC | Age: 76
End: 2021-02-18
Payer: MEDICARE

## 2021-02-18 VITALS
DIASTOLIC BLOOD PRESSURE: 54 MMHG | SYSTOLIC BLOOD PRESSURE: 110 MMHG | BODY MASS INDEX: 35.02 KG/M2 | HEART RATE: 80 BPM | TEMPERATURE: 98 F | HEIGHT: 68 IN | WEIGHT: 231.06 LBS

## 2021-02-18 DIAGNOSIS — E11.59 HYPERTENSION ASSOCIATED WITH DIABETES: ICD-10-CM

## 2021-02-18 DIAGNOSIS — E11.3513 TYPE 2 DIABETES MELLITUS WITH BOTH EYES AFFECTED BY PROLIFERATIVE RETINOPATHY AND MACULAR EDEMA, WITH LONG-TERM CURRENT USE OF INSULIN: Primary | ICD-10-CM

## 2021-02-18 DIAGNOSIS — Z79.4 TYPE 2 DIABETES MELLITUS WITH BOTH EYES AFFECTED BY PROLIFERATIVE RETINOPATHY AND MACULAR EDEMA, WITH LONG-TERM CURRENT USE OF INSULIN: Primary | ICD-10-CM

## 2021-02-18 DIAGNOSIS — I15.2 HYPERTENSION ASSOCIATED WITH DIABETES: ICD-10-CM

## 2021-02-18 DIAGNOSIS — N18.31 STAGE 3A CHRONIC KIDNEY DISEASE: ICD-10-CM

## 2021-02-18 DIAGNOSIS — Z11.1 SCREENING-PULMONARY TB: ICD-10-CM

## 2021-02-18 DIAGNOSIS — R53.1 WEAKNESS: ICD-10-CM

## 2021-02-18 PROCEDURE — 99215 OFFICE O/P EST HI 40 MIN: CPT | Mod: PBBFAC,PO | Performed by: FAMILY MEDICINE

## 2021-02-18 PROCEDURE — 99214 OFFICE O/P EST MOD 30 MIN: CPT | Mod: S$PBB,,, | Performed by: FAMILY MEDICINE

## 2021-02-18 PROCEDURE — 99999 PR PBB SHADOW E&M-EST. PATIENT-LVL V: CPT | Mod: PBBFAC,,, | Performed by: FAMILY MEDICINE

## 2021-02-18 PROCEDURE — 99214 PR OFFICE/OUTPT VISIT, EST, LEVL IV, 30-39 MIN: ICD-10-PCS | Mod: S$PBB,,, | Performed by: FAMILY MEDICINE

## 2021-02-18 PROCEDURE — 99999 PR PBB SHADOW E&M-EST. PATIENT-LVL V: ICD-10-PCS | Mod: PBBFAC,,, | Performed by: FAMILY MEDICINE

## 2021-02-18 RX ORDER — AMLODIPINE BESYLATE 5 MG/1
5 TABLET ORAL DAILY
COMMUNITY
Start: 2021-01-29

## 2021-02-18 RX ORDER — BISACODYL 5 MG
TABLET, DELAYED RELEASE (ENTERIC COATED) ORAL
COMMUNITY
End: 2023-08-31

## 2021-02-19 ENCOUNTER — TELEPHONE (OUTPATIENT)
Dept: INTERNAL MEDICINE | Facility: CLINIC | Age: 76
End: 2021-02-19

## 2021-02-22 ENCOUNTER — CLINICAL SUPPORT (OUTPATIENT)
Dept: INTERNAL MEDICINE | Facility: CLINIC | Age: 76
End: 2021-02-22
Payer: MEDICARE

## 2021-02-22 DIAGNOSIS — Z11.1 SCREENING-PULMONARY TB: ICD-10-CM

## 2021-02-22 PROCEDURE — 99213 OFFICE O/P EST LOW 20 MIN: CPT | Mod: PBBFAC,PO

## 2021-02-22 PROCEDURE — 99999 PR PBB SHADOW E&M-EST. PATIENT-LVL III: ICD-10-PCS | Mod: PBBFAC,,,

## 2021-02-22 PROCEDURE — 99999 PR PBB SHADOW E&M-EST. PATIENT-LVL III: CPT | Mod: PBBFAC,,,

## 2021-02-22 PROCEDURE — 86580 TB INTRADERMAL TEST: CPT | Mod: PBBFAC,PO

## 2021-02-24 ENCOUNTER — CLINICAL SUPPORT (OUTPATIENT)
Dept: INTERNAL MEDICINE | Facility: CLINIC | Age: 76
End: 2021-02-24
Payer: MEDICARE

## 2021-02-24 DIAGNOSIS — R76.11 POSITIVE PPD: ICD-10-CM

## 2021-02-24 DIAGNOSIS — Z11.1 ENCOUNTER FOR PPD SKIN TEST READING: Primary | ICD-10-CM

## 2021-02-24 PROCEDURE — 99999 PR PBB SHADOW E&M-EST. PATIENT-LVL III: ICD-10-PCS | Mod: PBBFAC,,,

## 2021-02-24 PROCEDURE — 99213 OFFICE O/P EST LOW 20 MIN: CPT | Mod: PBBFAC,PO

## 2021-02-24 PROCEDURE — 99999 PR PBB SHADOW E&M-EST. PATIENT-LVL III: CPT | Mod: PBBFAC,,,

## 2021-02-24 PROCEDURE — 99212 OFFICE O/P EST SF 10 MIN: CPT | Mod: S$PBB,,, | Performed by: FAMILY MEDICINE

## 2021-02-24 PROCEDURE — 99212 PR OFFICE/OUTPT VISIT, EST, LEVL II, 10-19 MIN: ICD-10-PCS | Mod: S$PBB,,, | Performed by: FAMILY MEDICINE

## 2021-02-25 ENCOUNTER — HOSPITAL ENCOUNTER (OUTPATIENT)
Dept: RADIOLOGY | Facility: HOSPITAL | Age: 76
Discharge: HOME | End: 2021-02-25
Attending: FAMILY MEDICINE
Payer: MEDICARE

## 2021-02-25 DIAGNOSIS — Z11.1 ENCOUNTER FOR PPD SKIN TEST READING: ICD-10-CM

## 2021-02-25 DIAGNOSIS — R76.11 POSITIVE PPD: ICD-10-CM

## 2021-02-25 PROCEDURE — 71046 X-RAY EXAM CHEST 2 VIEWS: CPT | Mod: TC,FY,PO

## 2021-02-25 PROCEDURE — 71046 X-RAY EXAM CHEST 2 VIEWS: CPT | Mod: 26,,, | Performed by: RADIOLOGY

## 2021-02-25 PROCEDURE — 71046 XR CHEST PA AND LATERAL: ICD-10-PCS | Mod: 26,,, | Performed by: RADIOLOGY

## 2021-03-01 ENCOUNTER — TELEPHONE (OUTPATIENT)
Dept: INTERNAL MEDICINE | Facility: CLINIC | Age: 76
End: 2021-03-01

## 2021-03-03 ENCOUNTER — TELEPHONE (OUTPATIENT)
Dept: DIABETES | Facility: CLINIC | Age: 76
End: 2021-03-03

## 2021-03-12 ENCOUNTER — TELEPHONE (OUTPATIENT)
Dept: INTERNAL MEDICINE | Facility: CLINIC | Age: 76
End: 2021-03-12

## 2021-03-15 ENCOUNTER — TELEPHONE (OUTPATIENT)
Dept: INTERNAL MEDICINE | Facility: CLINIC | Age: 76
End: 2021-03-15

## 2021-03-15 DIAGNOSIS — E11.9 TYPE 2 DIABETES MELLITUS WITH HEMOGLOBIN A1C GOAL OF LESS THAN 7.0%: ICD-10-CM

## 2021-03-15 RX ORDER — INSULIN GLARGINE 100 [IU]/ML
50 INJECTION, SOLUTION SUBCUTANEOUS NIGHTLY
Qty: 15 ML | Refills: 3
Start: 2021-03-15 | End: 2023-11-07 | Stop reason: SDUPTHER

## 2021-03-15 RX ORDER — INSULIN ASPART 100 [IU]/ML
12 INJECTION, SOLUTION INTRAVENOUS; SUBCUTANEOUS
Qty: 15 ML | Refills: 0
Start: 2021-03-15 | End: 2021-03-15 | Stop reason: DRUGHIGH

## 2021-03-15 RX ORDER — INSULIN ASPART 100 [IU]/ML
14 INJECTION, SOLUTION INTRAVENOUS; SUBCUTANEOUS
COMMUNITY
End: 2023-11-07 | Stop reason: SDUPTHER

## 2021-03-18 ENCOUNTER — PROCEDURE VISIT (OUTPATIENT)
Dept: OPHTHALMOLOGY | Facility: CLINIC | Age: 76
End: 2021-03-18
Payer: MEDICARE

## 2021-03-18 DIAGNOSIS — E11.3513 PROLIFERATIVE DIABETIC RETINOPATHY OF BOTH EYES WITH MACULAR EDEMA ASSOCIATED WITH TYPE 2 DIABETES MELLITUS: Primary | ICD-10-CM

## 2021-03-18 PROCEDURE — 92134 POSTERIOR SEGMENT OCT RETINA (OCULAR COHERENCE TOMOGRAPHY)-BOTH EYES: ICD-10-PCS | Mod: 26,S$PBB,, | Performed by: OPHTHALMOLOGY

## 2021-03-18 PROCEDURE — 99214 PR OFFICE/OUTPT VISIT, EST, LEVL IV, 30-39 MIN: ICD-10-PCS | Mod: S$PBB,,, | Performed by: OPHTHALMOLOGY

## 2021-03-18 PROCEDURE — 92134 CPTRZ OPH DX IMG PST SGM RTA: CPT | Mod: PBBFAC | Performed by: OPHTHALMOLOGY

## 2021-03-18 PROCEDURE — 99214 OFFICE O/P EST MOD 30 MIN: CPT | Mod: S$PBB,,, | Performed by: OPHTHALMOLOGY

## 2021-03-26 ENCOUNTER — PROCEDURE VISIT (OUTPATIENT)
Dept: OPHTHALMOLOGY | Facility: CLINIC | Age: 76
End: 2021-03-26
Payer: MEDICARE

## 2021-03-26 DIAGNOSIS — E11.3513 PROLIFERATIVE DIABETIC RETINOPATHY OF BOTH EYES WITH MACULAR EDEMA ASSOCIATED WITH TYPE 2 DIABETES MELLITUS: Primary | ICD-10-CM

## 2021-03-26 PROCEDURE — 99499 UNLISTED E&M SERVICE: CPT | Mod: S$PBB,,, | Performed by: OPHTHALMOLOGY

## 2021-03-26 PROCEDURE — 67028 INJECTION EYE DRUG: CPT | Mod: PBBFAC,LT | Performed by: OPHTHALMOLOGY

## 2021-03-26 PROCEDURE — 67028 PR INJECT INTRAVITREAL PHARMCOLOGIC: ICD-10-PCS | Mod: S$PBB,LT,, | Performed by: OPHTHALMOLOGY

## 2021-03-26 PROCEDURE — 92134 POSTERIOR SEGMENT OCT RETINA (OCULAR COHERENCE TOMOGRAPHY)-BOTH EYES: ICD-10-PCS | Mod: 26,S$PBB,, | Performed by: OPHTHALMOLOGY

## 2021-03-26 PROCEDURE — 67028 INJECTION EYE DRUG: CPT | Mod: S$PBB,LT,, | Performed by: OPHTHALMOLOGY

## 2021-03-26 PROCEDURE — 99499 NO LOS: ICD-10-PCS | Mod: S$PBB,,, | Performed by: OPHTHALMOLOGY

## 2021-03-26 PROCEDURE — 92134 CPTRZ OPH DX IMG PST SGM RTA: CPT | Mod: PBBFAC | Performed by: OPHTHALMOLOGY

## 2021-03-26 RX ADMIN — DEXAMETHASONE 0.7 MG: 0.7 IMPLANT INTRAVITREAL at 02:03

## 2021-03-31 PROCEDURE — G0179 MD RECERTIFICATION HHA PT: HCPCS | Mod: ,,, | Performed by: FAMILY MEDICINE

## 2021-03-31 PROCEDURE — G0179 PR HOME HEALTH MD RECERTIFICATION: ICD-10-PCS | Mod: ,,, | Performed by: FAMILY MEDICINE

## 2021-04-29 ENCOUNTER — PATIENT OUTREACH (OUTPATIENT)
Dept: ADMINISTRATIVE | Facility: OTHER | Age: 76
End: 2021-04-29

## 2021-04-30 ENCOUNTER — OFFICE VISIT (OUTPATIENT)
Dept: OPHTHALMOLOGY | Facility: CLINIC | Age: 76
End: 2021-04-30
Payer: MEDICARE

## 2021-04-30 DIAGNOSIS — E11.3513 PROLIFERATIVE DIABETIC RETINOPATHY OF BOTH EYES WITH MACULAR EDEMA ASSOCIATED WITH TYPE 2 DIABETES MELLITUS: Primary | ICD-10-CM

## 2021-04-30 PROCEDURE — 99999 PR PBB SHADOW E&M-EST. PATIENT-LVL III: CPT | Mod: PBBFAC,,, | Performed by: OPHTHALMOLOGY

## 2021-04-30 PROCEDURE — 92134 CPTRZ OPH DX IMG PST SGM RTA: CPT | Mod: PBBFAC | Performed by: OPHTHALMOLOGY

## 2021-04-30 PROCEDURE — 92134 POSTERIOR SEGMENT OCT RETINA (OCULAR COHERENCE TOMOGRAPHY)-BOTH EYES: ICD-10-PCS | Mod: 26,S$PBB,, | Performed by: OPHTHALMOLOGY

## 2021-04-30 PROCEDURE — 99999 PR PBB SHADOW E&M-EST. PATIENT-LVL III: ICD-10-PCS | Mod: PBBFAC,,, | Performed by: OPHTHALMOLOGY

## 2021-04-30 PROCEDURE — 99499 UNLISTED E&M SERVICE: CPT | Mod: S$PBB,,, | Performed by: OPHTHALMOLOGY

## 2021-04-30 PROCEDURE — 99499 NO LOS: ICD-10-PCS | Mod: S$PBB,,, | Performed by: OPHTHALMOLOGY

## 2021-04-30 PROCEDURE — 99213 OFFICE O/P EST LOW 20 MIN: CPT | Mod: PBBFAC | Performed by: OPHTHALMOLOGY

## 2021-05-26 ENCOUNTER — PATIENT MESSAGE (OUTPATIENT)
Dept: DIABETES | Facility: CLINIC | Age: 76
End: 2021-05-26

## 2021-05-26 ENCOUNTER — TELEPHONE (OUTPATIENT)
Dept: DIABETES | Facility: CLINIC | Age: 76
End: 2021-05-26

## 2021-06-03 ENCOUNTER — EXTERNAL HOME HEALTH (OUTPATIENT)
Dept: HOME HEALTH SERVICES | Facility: HOSPITAL | Age: 76
End: 2021-06-03
Payer: MEDICARE

## 2021-06-07 ENCOUNTER — TELEPHONE (OUTPATIENT)
Dept: OPHTHALMOLOGY | Facility: CLINIC | Age: 76
End: 2021-06-07

## 2021-06-11 ENCOUNTER — OFFICE VISIT (OUTPATIENT)
Dept: OPHTHALMOLOGY | Facility: CLINIC | Age: 76
End: 2021-06-11
Payer: MEDICARE

## 2021-06-11 DIAGNOSIS — E11.3513 PROLIFERATIVE DIABETIC RETINOPATHY OF BOTH EYES WITH MACULAR EDEMA ASSOCIATED WITH TYPE 2 DIABETES MELLITUS: Primary | ICD-10-CM

## 2021-06-11 PROCEDURE — 99999 PR PBB SHADOW E&M-EST. PATIENT-LVL I: CPT | Mod: PBBFAC,,, | Performed by: STUDENT IN AN ORGANIZED HEALTH CARE EDUCATION/TRAINING PROGRAM

## 2021-06-11 PROCEDURE — 99211 OFF/OP EST MAY X REQ PHY/QHP: CPT | Mod: PBBFAC | Performed by: STUDENT IN AN ORGANIZED HEALTH CARE EDUCATION/TRAINING PROGRAM

## 2021-06-11 PROCEDURE — 92012 INTRM OPH EXAM EST PATIENT: CPT | Mod: S$PBB,,, | Performed by: STUDENT IN AN ORGANIZED HEALTH CARE EDUCATION/TRAINING PROGRAM

## 2021-06-11 PROCEDURE — 92012 PR EYE EXAM, EST PATIENT,INTERMED: ICD-10-PCS | Mod: S$PBB,,, | Performed by: STUDENT IN AN ORGANIZED HEALTH CARE EDUCATION/TRAINING PROGRAM

## 2021-06-11 PROCEDURE — 99999 PR PBB SHADOW E&M-EST. PATIENT-LVL I: ICD-10-PCS | Mod: PBBFAC,,, | Performed by: STUDENT IN AN ORGANIZED HEALTH CARE EDUCATION/TRAINING PROGRAM

## 2021-06-15 ENCOUNTER — TELEPHONE (OUTPATIENT)
Dept: OPHTHALMOLOGY | Facility: CLINIC | Age: 76
End: 2021-06-15

## 2021-06-15 ENCOUNTER — OFFICE VISIT (OUTPATIENT)
Dept: OPHTHALMOLOGY | Facility: CLINIC | Age: 76
End: 2021-06-15
Payer: MEDICARE

## 2021-06-15 DIAGNOSIS — E11.3513 TYPE 2 DIABETES MELLITUS WITH BOTH EYES AFFECTED BY PROLIFERATIVE RETINOPATHY AND MACULAR EDEMA, WITH LONG-TERM CURRENT USE OF INSULIN: ICD-10-CM

## 2021-06-15 DIAGNOSIS — E11.3513 PROLIFERATIVE DIABETIC RETINOPATHY OF BOTH EYES WITH MACULAR EDEMA ASSOCIATED WITH TYPE 2 DIABETES MELLITUS: Primary | ICD-10-CM

## 2021-06-15 DIAGNOSIS — Z79.4 TYPE 2 DIABETES MELLITUS WITH BOTH EYES AFFECTED BY PROLIFERATIVE RETINOPATHY AND MACULAR EDEMA, WITH LONG-TERM CURRENT USE OF INSULIN: ICD-10-CM

## 2021-06-15 DIAGNOSIS — H40.62X4 GLAUCOMA SECONDARY TO DRUGS, LEFT EYE, INDETERMINATE STAGE: ICD-10-CM

## 2021-06-15 PROCEDURE — 99999 PR PBB SHADOW E&M-EST. PATIENT-LVL III: ICD-10-PCS | Mod: PBBFAC,,, | Performed by: STUDENT IN AN ORGANIZED HEALTH CARE EDUCATION/TRAINING PROGRAM

## 2021-06-15 PROCEDURE — 92133 POSTERIOR SEGMENT OCT OPTIC NERVE(OCULAR COHERENCE TOMOGRAPHY) - OU - BOTH EYES: ICD-10-PCS | Mod: 26,S$PBB,, | Performed by: STUDENT IN AN ORGANIZED HEALTH CARE EDUCATION/TRAINING PROGRAM

## 2021-06-15 PROCEDURE — 92012 INTRM OPH EXAM EST PATIENT: CPT | Mod: S$PBB,,, | Performed by: STUDENT IN AN ORGANIZED HEALTH CARE EDUCATION/TRAINING PROGRAM

## 2021-06-15 PROCEDURE — 92012 PR EYE EXAM, EST PATIENT,INTERMED: ICD-10-PCS | Mod: S$PBB,,, | Performed by: STUDENT IN AN ORGANIZED HEALTH CARE EDUCATION/TRAINING PROGRAM

## 2021-06-15 PROCEDURE — 99999 PR PBB SHADOW E&M-EST. PATIENT-LVL III: CPT | Mod: PBBFAC,,, | Performed by: STUDENT IN AN ORGANIZED HEALTH CARE EDUCATION/TRAINING PROGRAM

## 2021-06-15 PROCEDURE — 92133 CPTRZD OPH DX IMG PST SGM ON: CPT | Mod: PBBFAC | Performed by: STUDENT IN AN ORGANIZED HEALTH CARE EDUCATION/TRAINING PROGRAM

## 2021-06-15 PROCEDURE — 99213 OFFICE O/P EST LOW 20 MIN: CPT | Mod: PBBFAC | Performed by: STUDENT IN AN ORGANIZED HEALTH CARE EDUCATION/TRAINING PROGRAM

## 2021-07-08 ENCOUNTER — OFFICE VISIT (OUTPATIENT)
Dept: URGENT CARE | Facility: CLINIC | Age: 76
End: 2021-07-08
Payer: MEDICARE

## 2021-07-08 VITALS
WEIGHT: 220 LBS | HEART RATE: 64 BPM | BODY MASS INDEX: 33.34 KG/M2 | OXYGEN SATURATION: 96 % | TEMPERATURE: 98 F | DIASTOLIC BLOOD PRESSURE: 64 MMHG | RESPIRATION RATE: 16 BRPM | HEIGHT: 68 IN | SYSTOLIC BLOOD PRESSURE: 149 MMHG

## 2021-07-08 DIAGNOSIS — J06.9 VIRAL URI: Primary | ICD-10-CM

## 2021-07-08 DIAGNOSIS — R05.9 COUGH: ICD-10-CM

## 2021-07-08 LAB
CTP QC/QA: YES
SARS-COV-2 RDRP RESP QL NAA+PROBE: NEGATIVE

## 2021-07-08 PROCEDURE — U0002 COVID-19 LAB TEST NON-CDC: HCPCS | Mod: QW,CR,S$GLB, | Performed by: NURSE PRACTITIONER

## 2021-07-08 PROCEDURE — 99214 OFFICE O/P EST MOD 30 MIN: CPT | Mod: S$GLB,,, | Performed by: NURSE PRACTITIONER

## 2021-07-08 PROCEDURE — 99214 PR OFFICE/OUTPT VISIT, EST, LEVL IV, 30-39 MIN: ICD-10-PCS | Mod: S$GLB,,, | Performed by: NURSE PRACTITIONER

## 2021-07-08 PROCEDURE — U0002: ICD-10-PCS | Mod: QW,CR,S$GLB, | Performed by: NURSE PRACTITIONER

## 2021-07-08 RX ORDER — PROMETHAZINE HYDROCHLORIDE AND DEXTROMETHORPHAN HYDROBROMIDE 6.25; 15 MG/5ML; MG/5ML
5 SYRUP ORAL
Qty: 118 ML | Refills: 0 | Status: SHIPPED | OUTPATIENT
Start: 2021-07-08 | End: 2021-07-08

## 2021-07-08 RX ORDER — BENZONATATE 100 MG/1
200 CAPSULE ORAL 3 TIMES DAILY PRN
Qty: 30 CAPSULE | Refills: 0 | Status: SHIPPED | OUTPATIENT
Start: 2021-07-08 | End: 2021-07-18

## 2021-07-08 RX ORDER — PROMETHAZINE HYDROCHLORIDE AND DEXTROMETHORPHAN HYDROBROMIDE 6.25; 15 MG/5ML; MG/5ML
5 SYRUP ORAL
Qty: 118 ML | Refills: 0 | Status: SHIPPED | OUTPATIENT
Start: 2021-07-08 | End: 2021-07-18

## 2021-07-08 RX ORDER — BENZONATATE 100 MG/1
200 CAPSULE ORAL 3 TIMES DAILY PRN
Qty: 30 CAPSULE | Refills: 0 | Status: SHIPPED | OUTPATIENT
Start: 2021-07-08 | End: 2021-07-08

## 2021-07-20 ENCOUNTER — PROCEDURE VISIT (OUTPATIENT)
Dept: OPHTHALMOLOGY | Facility: CLINIC | Age: 76
End: 2021-07-20
Payer: MEDICARE

## 2021-07-20 DIAGNOSIS — H40.62X4 GLAUCOMA OF LEFT EYE SECONDARY TO DRUGS, INDETERMINATE STAGE: ICD-10-CM

## 2021-07-20 DIAGNOSIS — E11.3513 PROLIFERATIVE DIABETIC RETINOPATHY OF BOTH EYES WITH MACULAR EDEMA ASSOCIATED WITH TYPE 2 DIABETES MELLITUS: Primary | ICD-10-CM

## 2021-07-20 PROCEDURE — 67028 PR INJECT INTRAVITREAL PHARMCOLOGIC: ICD-10-PCS | Mod: S$PBB,LT,, | Performed by: OPHTHALMOLOGY

## 2021-07-20 PROCEDURE — 99499 NO LOS: ICD-10-PCS | Mod: S$PBB,,, | Performed by: OPHTHALMOLOGY

## 2021-07-20 PROCEDURE — 67028 INJECTION EYE DRUG: CPT | Mod: S$PBB,LT,, | Performed by: OPHTHALMOLOGY

## 2021-07-20 PROCEDURE — 67028 INJECTION EYE DRUG: CPT | Mod: PBBFAC,LT | Performed by: OPHTHALMOLOGY

## 2021-07-20 PROCEDURE — 92134 CPTRZ OPH DX IMG PST SGM RTA: CPT | Mod: PBBFAC | Performed by: OPHTHALMOLOGY

## 2021-07-20 PROCEDURE — 99499 UNLISTED E&M SERVICE: CPT | Mod: S$PBB,,, | Performed by: OPHTHALMOLOGY

## 2021-07-20 PROCEDURE — 92134 POSTERIOR SEGMENT OCT RETINA (OCULAR COHERENCE TOMOGRAPHY)-BOTH EYES: ICD-10-PCS | Mod: 26,S$PBB,, | Performed by: OPHTHALMOLOGY

## 2021-07-20 RX ADMIN — AFLIBERCEPT 2 MG: 40 INJECTION, SOLUTION INTRAVITREAL at 03:07

## 2021-07-27 ENCOUNTER — OFFICE VISIT (OUTPATIENT)
Dept: OPHTHALMOLOGY | Facility: CLINIC | Age: 76
End: 2021-07-27
Payer: MEDICARE

## 2021-07-27 DIAGNOSIS — H40.62X4 GLAUCOMA OF LEFT EYE SECONDARY TO DRUGS, INDETERMINATE STAGE: Primary | ICD-10-CM

## 2021-07-27 DIAGNOSIS — H20.9 UVEITIS: ICD-10-CM

## 2021-07-27 DIAGNOSIS — E11.3513 TYPE 2 DIABETES MELLITUS WITH BOTH EYES AFFECTED BY PROLIFERATIVE RETINOPATHY AND MACULAR EDEMA, WITH LONG-TERM CURRENT USE OF INSULIN: ICD-10-CM

## 2021-07-27 DIAGNOSIS — Z79.4 TYPE 2 DIABETES MELLITUS WITH BOTH EYES AFFECTED BY PROLIFERATIVE RETINOPATHY AND MACULAR EDEMA, WITH LONG-TERM CURRENT USE OF INSULIN: ICD-10-CM

## 2021-07-27 PROCEDURE — 92020 GONIOSCOPY: CPT | Mod: PBBFAC | Performed by: OPHTHALMOLOGY

## 2021-07-27 PROCEDURE — 99214 PR OFFICE/OUTPT VISIT, EST, LEVL IV, 30-39 MIN: ICD-10-PCS | Mod: S$PBB,,, | Performed by: OPHTHALMOLOGY

## 2021-07-27 PROCEDURE — 99212 OFFICE O/P EST SF 10 MIN: CPT | Mod: PBBFAC | Performed by: OPHTHALMOLOGY

## 2021-07-27 PROCEDURE — 92020 GONIOSCOPY: CPT | Mod: S$PBB,,, | Performed by: OPHTHALMOLOGY

## 2021-07-27 PROCEDURE — 99214 OFFICE O/P EST MOD 30 MIN: CPT | Mod: S$PBB,,, | Performed by: OPHTHALMOLOGY

## 2021-07-27 PROCEDURE — 92133 POSTERIOR SEGMENT OCT OPTIC NERVE(OCULAR COHERENCE TOMOGRAPHY) - OU - BOTH EYES: ICD-10-PCS | Mod: 26,S$PBB,, | Performed by: OPHTHALMOLOGY

## 2021-07-27 PROCEDURE — 92020 PR SPECIAL EYE EVAL,GONIOSCOPY: ICD-10-PCS | Mod: S$PBB,,, | Performed by: OPHTHALMOLOGY

## 2021-07-27 PROCEDURE — 99999 PR PBB SHADOW E&M-EST. PATIENT-LVL II: CPT | Mod: PBBFAC,,, | Performed by: OPHTHALMOLOGY

## 2021-07-27 PROCEDURE — 99999 PR PBB SHADOW E&M-EST. PATIENT-LVL II: ICD-10-PCS | Mod: PBBFAC,,, | Performed by: OPHTHALMOLOGY

## 2021-07-27 PROCEDURE — 92133 CPTRZD OPH DX IMG PST SGM ON: CPT | Mod: PBBFAC | Performed by: OPHTHALMOLOGY

## 2021-07-27 RX ORDER — LOTEPREDNOL ETABONATE 5 MG/ML
1 SUSPENSION/ DROPS OPHTHALMIC 3 TIMES DAILY
Qty: 5 ML | Refills: 1 | Status: SHIPPED | OUTPATIENT
Start: 2021-07-27 | End: 2022-03-31

## 2021-08-04 ENCOUNTER — PATIENT MESSAGE (OUTPATIENT)
Dept: ADMINISTRATIVE | Facility: HOSPITAL | Age: 76
End: 2021-08-04

## 2021-08-13 ENCOUNTER — TELEPHONE (OUTPATIENT)
Dept: OPHTHALMOLOGY | Facility: CLINIC | Age: 76
End: 2021-08-13

## 2021-08-18 ENCOUNTER — PATIENT OUTREACH (OUTPATIENT)
Dept: ADMINISTRATIVE | Facility: OTHER | Age: 76
End: 2021-08-18

## 2021-08-19 ENCOUNTER — OFFICE VISIT (OUTPATIENT)
Dept: OPHTHALMOLOGY | Facility: CLINIC | Age: 76
End: 2021-08-19
Payer: MEDICARE

## 2021-08-19 DIAGNOSIS — E11.9 TYPE 2 DIABETES MELLITUS WITH HEMOGLOBIN A1C GOAL OF LESS THAN 7.0%: ICD-10-CM

## 2021-08-19 DIAGNOSIS — Z96.1 LENS REPLACED BY OTHER MEANS: ICD-10-CM

## 2021-08-19 DIAGNOSIS — H20.9 UVEITIS: Primary | ICD-10-CM

## 2021-08-19 DIAGNOSIS — E11.311 DIABETIC MACULAR EDEMA: ICD-10-CM

## 2021-08-19 DIAGNOSIS — Z96.1 PSEUDOPHAKIA: ICD-10-CM

## 2021-08-19 DIAGNOSIS — H40.62X4 GLAUCOMA OF LEFT EYE SECONDARY TO DRUGS, INDETERMINATE STAGE: ICD-10-CM

## 2021-08-19 DIAGNOSIS — H18.9 KERATOPATHY: ICD-10-CM

## 2021-08-19 PROCEDURE — 99999 PR PBB SHADOW E&M-EST. PATIENT-LVL I: CPT | Mod: PBBFAC,,, | Performed by: OPHTHALMOLOGY

## 2021-08-19 PROCEDURE — 99213 PR OFFICE/OUTPT VISIT, EST, LEVL III, 20-29 MIN: ICD-10-PCS | Mod: S$PBB,,, | Performed by: OPHTHALMOLOGY

## 2021-08-19 PROCEDURE — 99213 OFFICE O/P EST LOW 20 MIN: CPT | Mod: S$PBB,,, | Performed by: OPHTHALMOLOGY

## 2021-08-19 PROCEDURE — 99999 PR PBB SHADOW E&M-EST. PATIENT-LVL I: ICD-10-PCS | Mod: PBBFAC,,, | Performed by: OPHTHALMOLOGY

## 2021-08-19 PROCEDURE — 99211 OFF/OP EST MAY X REQ PHY/QHP: CPT | Mod: PBBFAC | Performed by: OPHTHALMOLOGY

## 2021-08-26 ENCOUNTER — PROCEDURE VISIT (OUTPATIENT)
Dept: OPHTHALMOLOGY | Facility: CLINIC | Age: 76
End: 2021-08-26
Payer: MEDICARE

## 2021-08-26 DIAGNOSIS — E11.3513 TYPE 2 DIABETES MELLITUS WITH BOTH EYES AFFECTED BY PROLIFERATIVE RETINOPATHY AND MACULAR EDEMA, WITH LONG-TERM CURRENT USE OF INSULIN: Primary | ICD-10-CM

## 2021-08-26 DIAGNOSIS — Z79.4 TYPE 2 DIABETES MELLITUS WITH BOTH EYES AFFECTED BY PROLIFERATIVE RETINOPATHY AND MACULAR EDEMA, WITH LONG-TERM CURRENT USE OF INSULIN: Primary | ICD-10-CM

## 2021-08-26 PROCEDURE — 99499 NO LOS: ICD-10-PCS | Mod: S$PBB,,, | Performed by: OPHTHALMOLOGY

## 2021-08-26 PROCEDURE — 67028 INJECTION EYE DRUG: CPT | Mod: PBBFAC,LT | Performed by: OPHTHALMOLOGY

## 2021-08-26 PROCEDURE — 67028 PR INJECT INTRAVITREAL PHARMCOLOGIC: ICD-10-PCS | Mod: S$PBB,LT,, | Performed by: OPHTHALMOLOGY

## 2021-08-26 PROCEDURE — 67028 INJECTION EYE DRUG: CPT | Mod: S$PBB,LT,, | Performed by: OPHTHALMOLOGY

## 2021-08-26 PROCEDURE — 92134 POSTERIOR SEGMENT OCT RETINA (OCULAR COHERENCE TOMOGRAPHY)-BOTH EYES: ICD-10-PCS | Mod: 26,S$PBB,, | Performed by: OPHTHALMOLOGY

## 2021-08-26 PROCEDURE — 99499 UNLISTED E&M SERVICE: CPT | Mod: S$PBB,,, | Performed by: OPHTHALMOLOGY

## 2021-08-26 PROCEDURE — 92134 CPTRZ OPH DX IMG PST SGM RTA: CPT | Mod: PBBFAC | Performed by: OPHTHALMOLOGY

## 2021-08-26 RX ADMIN — AFLIBERCEPT 2 MG: 40 INJECTION, SOLUTION INTRAVITREAL at 03:08

## 2021-08-27 ENCOUNTER — TELEPHONE (OUTPATIENT)
Dept: INTERNAL MEDICINE | Facility: CLINIC | Age: 76
End: 2021-08-27

## 2021-09-29 DIAGNOSIS — E11.9 TYPE 2 DIABETES MELLITUS WITHOUT COMPLICATION: ICD-10-CM

## 2021-09-30 ENCOUNTER — TELEPHONE (OUTPATIENT)
Dept: OPHTHALMOLOGY | Facility: CLINIC | Age: 76
End: 2021-09-30

## 2021-10-07 ENCOUNTER — OFFICE VISIT (OUTPATIENT)
Dept: OPHTHALMOLOGY | Facility: CLINIC | Age: 76
End: 2021-10-07
Payer: MEDICARE

## 2021-10-07 DIAGNOSIS — H40.42X1 UVEITIC GLAUCOMA OF LEFT EYE, MILD STAGE: Primary | ICD-10-CM

## 2021-10-07 DIAGNOSIS — H20.9 UVEITIC GLAUCOMA OF LEFT EYE, MILD STAGE: Primary | ICD-10-CM

## 2021-10-07 PROCEDURE — 99999 PR PBB SHADOW E&M-EST. PATIENT-LVL III: CPT | Mod: PBBFAC,,, | Performed by: OPHTHALMOLOGY

## 2021-10-07 PROCEDURE — 99214 OFFICE O/P EST MOD 30 MIN: CPT | Mod: S$PBB,,, | Performed by: OPHTHALMOLOGY

## 2021-10-07 PROCEDURE — 99213 OFFICE O/P EST LOW 20 MIN: CPT | Mod: PBBFAC | Performed by: OPHTHALMOLOGY

## 2021-10-07 PROCEDURE — 99214 PR OFFICE/OUTPT VISIT, EST, LEVL IV, 30-39 MIN: ICD-10-PCS | Mod: S$PBB,,, | Performed by: OPHTHALMOLOGY

## 2021-10-07 PROCEDURE — 99999 PR PBB SHADOW E&M-EST. PATIENT-LVL III: ICD-10-PCS | Mod: PBBFAC,,, | Performed by: OPHTHALMOLOGY

## 2021-10-08 ENCOUNTER — TELEPHONE (OUTPATIENT)
Dept: OPHTHALMOLOGY | Facility: CLINIC | Age: 76
End: 2021-10-08

## 2021-10-21 ENCOUNTER — PROCEDURE VISIT (OUTPATIENT)
Dept: OPHTHALMOLOGY | Facility: CLINIC | Age: 76
End: 2021-10-21
Payer: MEDICARE

## 2021-10-21 DIAGNOSIS — E11.311 DIABETIC MACULAR EDEMA: ICD-10-CM

## 2021-10-21 DIAGNOSIS — E11.3513 TYPE 2 DIABETES MELLITUS WITH BOTH EYES AFFECTED BY PROLIFERATIVE RETINOPATHY AND MACULAR EDEMA, WITH LONG-TERM CURRENT USE OF INSULIN: Primary | ICD-10-CM

## 2021-10-21 DIAGNOSIS — Z79.4 TYPE 2 DIABETES MELLITUS WITH BOTH EYES AFFECTED BY PROLIFERATIVE RETINOPATHY AND MACULAR EDEMA, WITH LONG-TERM CURRENT USE OF INSULIN: Primary | ICD-10-CM

## 2021-10-21 PROCEDURE — 99499 NO LOS: ICD-10-PCS | Mod: S$PBB,,, | Performed by: OPHTHALMOLOGY

## 2021-10-21 PROCEDURE — 92134 CPTRZ OPH DX IMG PST SGM RTA: CPT | Mod: PBBFAC | Performed by: OPHTHALMOLOGY

## 2021-10-21 PROCEDURE — 92134 POSTERIOR SEGMENT OCT RETINA (OCULAR COHERENCE TOMOGRAPHY)-BOTH EYES: ICD-10-PCS | Mod: 26,S$PBB,, | Performed by: OPHTHALMOLOGY

## 2021-10-21 PROCEDURE — 67028 INJECTION EYE DRUG: CPT | Mod: PBBFAC,LT | Performed by: OPHTHALMOLOGY

## 2021-10-21 PROCEDURE — 99499 UNLISTED E&M SERVICE: CPT | Mod: S$PBB,,, | Performed by: OPHTHALMOLOGY

## 2021-10-21 PROCEDURE — 67028 PR INJECT INTRAVITREAL PHARMCOLOGIC: ICD-10-PCS | Mod: S$PBB,LT,, | Performed by: OPHTHALMOLOGY

## 2021-10-21 PROCEDURE — 67028 INJECTION EYE DRUG: CPT | Mod: S$PBB,LT,, | Performed by: OPHTHALMOLOGY

## 2021-10-21 RX ORDER — ONDANSETRON 4 MG/1
TABLET, ORALLY DISINTEGRATING ORAL
COMMUNITY
Start: 2021-08-27 | End: 2022-04-12 | Stop reason: SDUPTHER

## 2021-10-21 RX ADMIN — AFLIBERCEPT 2 MG: 40 INJECTION, SOLUTION INTRAVITREAL at 11:10

## 2021-10-28 ENCOUNTER — PROCEDURE VISIT (OUTPATIENT)
Dept: OPHTHALMOLOGY | Facility: CLINIC | Age: 76
End: 2021-10-28
Payer: MEDICARE

## 2021-10-28 DIAGNOSIS — E11.3513 PROLIFERATIVE DIABETIC RETINOPATHY OF BOTH EYES WITH MACULAR EDEMA ASSOCIATED WITH TYPE 2 DIABETES MELLITUS: Primary | ICD-10-CM

## 2021-10-28 PROCEDURE — 99499 NO LOS: ICD-10-PCS | Mod: S$PBB,,, | Performed by: OPHTHALMOLOGY

## 2021-10-28 PROCEDURE — 99499 UNLISTED E&M SERVICE: CPT | Mod: S$PBB,,, | Performed by: OPHTHALMOLOGY

## 2021-10-28 PROCEDURE — 67028 INJECTION EYE DRUG: CPT | Mod: S$PBB,RT,, | Performed by: OPHTHALMOLOGY

## 2021-10-28 PROCEDURE — 67028 PR INJECT INTRAVITREAL PHARMCOLOGIC: ICD-10-PCS | Mod: S$PBB,RT,, | Performed by: OPHTHALMOLOGY

## 2021-10-28 PROCEDURE — 67028 INJECTION EYE DRUG: CPT | Mod: PBBFAC,RT | Performed by: OPHTHALMOLOGY

## 2021-10-28 RX ADMIN — AFLIBERCEPT 2 MG: 40 INJECTION, SOLUTION INTRAVITREAL at 01:10

## 2021-11-03 ENCOUNTER — PATIENT MESSAGE (OUTPATIENT)
Dept: ADMINISTRATIVE | Facility: HOSPITAL | Age: 76
End: 2021-11-03
Payer: MEDICARE

## 2021-11-23 ENCOUNTER — PROCEDURE VISIT (OUTPATIENT)
Dept: OPHTHALMOLOGY | Facility: CLINIC | Age: 76
End: 2021-11-23
Payer: MEDICARE

## 2021-11-23 DIAGNOSIS — E11.3513 PROLIFERATIVE DIABETIC RETINOPATHY OF BOTH EYES WITH MACULAR EDEMA ASSOCIATED WITH TYPE 2 DIABETES MELLITUS: Primary | ICD-10-CM

## 2021-11-23 PROCEDURE — 92134 CPTRZ OPH DX IMG PST SGM RTA: CPT | Mod: PBBFAC | Performed by: OPHTHALMOLOGY

## 2021-11-23 PROCEDURE — 67028 PR INJECT INTRAVITREAL PHARMCOLOGIC: ICD-10-PCS | Mod: S$PBB,LT,, | Performed by: OPHTHALMOLOGY

## 2021-11-23 PROCEDURE — 99499 UNLISTED E&M SERVICE: CPT | Mod: S$PBB,,, | Performed by: OPHTHALMOLOGY

## 2021-11-23 PROCEDURE — 99499 NO LOS: ICD-10-PCS | Mod: S$PBB,,, | Performed by: OPHTHALMOLOGY

## 2021-11-23 PROCEDURE — 67028 INJECTION EYE DRUG: CPT | Mod: PBBFAC,LT | Performed by: OPHTHALMOLOGY

## 2021-11-23 PROCEDURE — 92134 POSTERIOR SEGMENT OCT RETINA (OCULAR COHERENCE TOMOGRAPHY)-BOTH EYES: ICD-10-PCS | Mod: 26,S$PBB,, | Performed by: OPHTHALMOLOGY

## 2021-11-23 PROCEDURE — 67028 INJECTION EYE DRUG: CPT | Mod: S$PBB,LT,, | Performed by: OPHTHALMOLOGY

## 2021-11-23 RX ADMIN — AFLIBERCEPT 2 MG: 40 INJECTION, SOLUTION INTRAVITREAL at 11:11

## 2021-12-07 ENCOUNTER — PROCEDURE VISIT (OUTPATIENT)
Dept: OPHTHALMOLOGY | Facility: CLINIC | Age: 76
End: 2021-12-07
Payer: MEDICARE

## 2021-12-07 DIAGNOSIS — H40.62X4 GLAUCOMA OF LEFT EYE SECONDARY TO DRUGS, INDETERMINATE STAGE: ICD-10-CM

## 2021-12-07 DIAGNOSIS — Z96.1 PSEUDOPHAKIA: ICD-10-CM

## 2021-12-07 DIAGNOSIS — H18.9 KERATOPATHY: ICD-10-CM

## 2021-12-07 DIAGNOSIS — Z79.4 TYPE 2 DIABETES MELLITUS WITH BOTH EYES AFFECTED BY PROLIFERATIVE RETINOPATHY AND MACULAR EDEMA, WITH LONG-TERM CURRENT USE OF INSULIN: Primary | ICD-10-CM

## 2021-12-07 DIAGNOSIS — E11.3513 TYPE 2 DIABETES MELLITUS WITH BOTH EYES AFFECTED BY PROLIFERATIVE RETINOPATHY AND MACULAR EDEMA, WITH LONG-TERM CURRENT USE OF INSULIN: Primary | ICD-10-CM

## 2021-12-07 PROCEDURE — 92134 POSTERIOR SEGMENT OCT RETINA (OCULAR COHERENCE TOMOGRAPHY)-BOTH EYES: ICD-10-PCS | Mod: 26,S$PBB,, | Performed by: OPHTHALMOLOGY

## 2021-12-07 PROCEDURE — 99499 UNLISTED E&M SERVICE: CPT | Mod: S$PBB,,, | Performed by: OPHTHALMOLOGY

## 2021-12-07 PROCEDURE — 99499 NO LOS: ICD-10-PCS | Mod: S$PBB,,, | Performed by: OPHTHALMOLOGY

## 2021-12-07 PROCEDURE — 92134 CPTRZ OPH DX IMG PST SGM RTA: CPT | Mod: PBBFAC | Performed by: OPHTHALMOLOGY

## 2021-12-07 RX ADMIN — AFLIBERCEPT 2 MG: 40 INJECTION, SOLUTION INTRAVITREAL at 01:12

## 2021-12-23 ENCOUNTER — PROCEDURE VISIT (OUTPATIENT)
Dept: OPHTHALMOLOGY | Facility: CLINIC | Age: 76
End: 2021-12-23
Payer: MEDICARE

## 2021-12-23 DIAGNOSIS — Z79.4 TYPE 2 DIABETES MELLITUS WITH BOTH EYES AFFECTED BY PROLIFERATIVE RETINOPATHY AND MACULAR EDEMA, WITH LONG-TERM CURRENT USE OF INSULIN: Primary | ICD-10-CM

## 2021-12-23 DIAGNOSIS — E11.3513 TYPE 2 DIABETES MELLITUS WITH BOTH EYES AFFECTED BY PROLIFERATIVE RETINOPATHY AND MACULAR EDEMA, WITH LONG-TERM CURRENT USE OF INSULIN: Primary | ICD-10-CM

## 2021-12-23 PROCEDURE — 99499 NO LOS: ICD-10-PCS | Mod: S$PBB,,, | Performed by: OPHTHALMOLOGY

## 2021-12-23 PROCEDURE — 67028 INJECTION EYE DRUG: CPT | Mod: S$PBB,LT,, | Performed by: OPHTHALMOLOGY

## 2021-12-23 PROCEDURE — 67028 INJECTION EYE DRUG: CPT | Mod: PBBFAC,LT | Performed by: OPHTHALMOLOGY

## 2021-12-23 PROCEDURE — 92134 CPTRZ OPH DX IMG PST SGM RTA: CPT | Mod: PBBFAC | Performed by: OPHTHALMOLOGY

## 2021-12-23 PROCEDURE — 99499 UNLISTED E&M SERVICE: CPT | Mod: S$PBB,,, | Performed by: OPHTHALMOLOGY

## 2021-12-23 PROCEDURE — 67028 PR INJECT INTRAVITREAL PHARMCOLOGIC: ICD-10-PCS | Mod: S$PBB,LT,, | Performed by: OPHTHALMOLOGY

## 2021-12-23 PROCEDURE — 92134 POSTERIOR SEGMENT OCT RETINA (OCULAR COHERENCE TOMOGRAPHY)-BOTH EYES: ICD-10-PCS | Mod: 26,S$PBB,, | Performed by: OPHTHALMOLOGY

## 2021-12-23 RX ADMIN — AFLIBERCEPT 2 MG: 40 INJECTION, SOLUTION INTRAVITREAL at 02:12

## 2022-01-06 ENCOUNTER — PROCEDURE VISIT (OUTPATIENT)
Dept: OPHTHALMOLOGY | Facility: CLINIC | Age: 77
End: 2022-01-06
Payer: MEDICARE

## 2022-01-06 DIAGNOSIS — E11.3513 TYPE 2 DIABETES MELLITUS WITH BOTH EYES AFFECTED BY PROLIFERATIVE RETINOPATHY AND MACULAR EDEMA, WITH LONG-TERM CURRENT USE OF INSULIN: Primary | ICD-10-CM

## 2022-01-06 DIAGNOSIS — Z79.4 TYPE 2 DIABETES MELLITUS WITH BOTH EYES AFFECTED BY PROLIFERATIVE RETINOPATHY AND MACULAR EDEMA, WITH LONG-TERM CURRENT USE OF INSULIN: Primary | ICD-10-CM

## 2022-01-06 PROCEDURE — 67028 INJECTION EYE DRUG: CPT | Mod: S$PBB,RT,, | Performed by: OPHTHALMOLOGY

## 2022-01-06 PROCEDURE — 92134 CPTRZ OPH DX IMG PST SGM RTA: CPT | Mod: PBBFAC | Performed by: OPHTHALMOLOGY

## 2022-01-06 PROCEDURE — 99499 UNLISTED E&M SERVICE: CPT | Mod: S$PBB,,, | Performed by: OPHTHALMOLOGY

## 2022-01-06 PROCEDURE — 99499 NO LOS: ICD-10-PCS | Mod: S$PBB,,, | Performed by: OPHTHALMOLOGY

## 2022-01-06 PROCEDURE — 67028 PR INJECT INTRAVITREAL PHARMCOLOGIC: ICD-10-PCS | Mod: S$PBB,RT,, | Performed by: OPHTHALMOLOGY

## 2022-01-06 PROCEDURE — 67028 INJECTION EYE DRUG: CPT | Mod: PBBFAC,RT | Performed by: OPHTHALMOLOGY

## 2022-01-06 PROCEDURE — 92134 POSTERIOR SEGMENT OCT RETINA (OCULAR COHERENCE TOMOGRAPHY)-BOTH EYES: ICD-10-PCS | Mod: 26,S$PBB,, | Performed by: OPHTHALMOLOGY

## 2022-01-06 RX ADMIN — AFLIBERCEPT 2 MG: 40 INJECTION, SOLUTION INTRAVITREAL at 01:01

## 2022-01-06 NOTE — PROGRESS NOTES
l    ===============================  Mono Bergman,  1/6/2022 today   76 y.o. male   Last visit [facility]: :12/23/2021   Last visit eye dept. 12/23/2021  [facility]:  Uncorrected distance visual acuity was 20/200 in the right eye and 20/200 in the left eye.  Tonometry     Tonometry (Applanation, 11:16 AM)       Right Left    Pressure 18 18          Max Pressure       Right Left    Max 22 31 (7/27/2021)              Not recorded       Not recorded       Not recorded       Chief Complaint   Patient presents with    dme     Eylea od     Ophthalmic Medications     Ophthalmic - Anti-inflammatory, Glucocorticoids Start End     loteprednol (LOTEMAX) 0.5 % ophthalmic suspension    7/27/2021 7/27/2022    Sig: Place 1 drop into the left eye 3 (three) times daily.    Class: Print    Route: Left Eye        ________________  1/6/2022 today  HPI     dme      Additional comments: Eylea od              Comments     1.) + DM SINCE 1997   2.) + PDR OU/ CSME OS   Bilateral PRP   PPV OS with Dr. 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 TEARS QID OS PRN  REFRESH GEL BID OS   OMEGA E BID P.O.     Eylea OD last 11/23/2021   Eylea OS last 12/23/2021  Ozurdex OS 3/26/2021 (steroid responder - IOP in 40s)          Last edited by GONZALO Keith on 1/6/2022 11:00 AM. (History)      Problem List Items Addressed This Visit    None     Visit Diagnoses     Type 2 diabetes mellitus with both eyes affected by proliferative retinopathy and macular edema, with long-term current use of insulin    -  Primary    Relevant Medications    aflibercept Soln 2 mg (Completed) (Start on 1/6/2022   1:15 PM)    Other Relevant Orders    Posterior Segment OCT Retina-Both eyes (Completed)    Prior authorization Order          clearly volume overloaded   oct stable   rtc  4 weeks   .monthly injections ou  Staggered one week     Injection Procedure Note:    1/6/2022  Diagnosis :  od dme  Today:   Eylea (afibercept) 2 mg/0.05 ml Intravitreal Injection , OD   Follow up: rtc  1 mo ou staggered    Instructed to call 24/7 for any worsening of vision. Check Both eyes daily. Gave patient my home phone number.  Risks, benefits, and alternatives to treatment discussed in detail with the patient.  The patient voiced understanding and wished to proceed with the procedure.     Patient Identified and Time Out complete  Subconjunctival bleb - xylocaine with epi 2% and Betadine.  Inject at Eylea (afibercept) 2 mg/0.05 ml Intravitreal Injection , OD 6:00 @ 3.5-4mm posterior to limbus  1 stop: no   Post Operative Dx: Same  Complications: None  Follow up as above.        ===========================

## 2022-01-06 NOTE — Clinical Note
I saw Mr. Bergman today for an eye injection and noticed he had some pretibial edema. I will see him back in about a month but I wanted to let you know about the volume overload observed at today's visit.

## 2022-01-26 DIAGNOSIS — E11.9 TYPE 2 DIABETES MELLITUS WITHOUT COMPLICATION: ICD-10-CM

## 2022-02-10 ENCOUNTER — PROCEDURE VISIT (OUTPATIENT)
Dept: OPHTHALMOLOGY | Facility: CLINIC | Age: 77
End: 2022-02-10
Payer: MEDICARE

## 2022-02-10 DIAGNOSIS — Z79.4 TYPE 2 DIABETES MELLITUS WITH BOTH EYES AFFECTED BY PROLIFERATIVE RETINOPATHY AND MACULAR EDEMA, WITH LONG-TERM CURRENT USE OF INSULIN: Primary | ICD-10-CM

## 2022-02-10 DIAGNOSIS — E11.3513 TYPE 2 DIABETES MELLITUS WITH BOTH EYES AFFECTED BY PROLIFERATIVE RETINOPATHY AND MACULAR EDEMA, WITH LONG-TERM CURRENT USE OF INSULIN: Primary | ICD-10-CM

## 2022-02-10 PROCEDURE — 99499 UNLISTED E&M SERVICE: CPT | Mod: S$PBB,,, | Performed by: OPHTHALMOLOGY

## 2022-02-10 PROCEDURE — 67028 PR INJECT INTRAVITREAL PHARMCOLOGIC: ICD-10-PCS | Mod: S$PBB,LT,, | Performed by: OPHTHALMOLOGY

## 2022-02-10 PROCEDURE — 92134 POSTERIOR SEGMENT OCT RETINA (OCULAR COHERENCE TOMOGRAPHY)-BOTH EYES: ICD-10-PCS | Mod: 26,S$PBB,, | Performed by: OPHTHALMOLOGY

## 2022-02-10 PROCEDURE — 92134 CPTRZ OPH DX IMG PST SGM RTA: CPT | Mod: PBBFAC | Performed by: OPHTHALMOLOGY

## 2022-02-10 PROCEDURE — 67028 INJECTION EYE DRUG: CPT | Mod: PBBFAC,LT | Performed by: OPHTHALMOLOGY

## 2022-02-10 PROCEDURE — 99499 NO LOS: ICD-10-PCS | Mod: S$PBB,,, | Performed by: OPHTHALMOLOGY

## 2022-02-10 PROCEDURE — 67028 INJECTION EYE DRUG: CPT | Mod: S$PBB,LT,, | Performed by: OPHTHALMOLOGY

## 2022-02-10 RX ADMIN — AFLIBERCEPT 2 MG: 40 INJECTION, SOLUTION INTRAVITREAL at 01:02

## 2022-02-10 NOTE — PROGRESS NOTES
===============================  Date today is 2/10/2022  Mono Bergman is a 76 y.o. male  Last visit [facility]: :1/6/2022   Last visit eye dept. 1/6/2022    Uncorrected distance visual acuity was 20/80 -2 in the right eye and 20/200 in the left eye.  Tonometry     Tonometry (Applanation, 1:25 PM)       Right Left    Pressure 12 16          Max Pressure       Right Left    Max 22 31 (7/27/2021)              Not recorded       Not recorded       Not recorded       Chief Complaint   Patient presents with    Procedure       Problem List Items Addressed This Visit    None     Visit Diagnoses     Type 2 diabetes mellitus with both eyes affected by proliferative retinopathy and macular edema, with long-term current use of insulin    -  Primary    Relevant Medications    aflibercept Soln 2 mg (Completed) (Start on 2/10/2022  2:00 PM)    Other Relevant Orders    Posterior Segment OCT Retina-Both eyes (Completed)    Prior authorization Order          ________________  2/10/2022 today      OU DME    MOCT better OD, worse OS  Pt feel injections aren't making much difference, I agree  Will do one more injection OS today, then follow  Will continue with monthly OD injections  Pt not sure on his glaucoma drops, not using Rhopressa or Brimonidine  Advised to discuss with [facility] next week at his visit      Injection Procedure Note:    2/10/2022  Diagnosis :  OS DME  Today:   Eylea (afibercept) 2 mg/0.05 ml Intravitreal Injection , OS   Follow up: rtc next week for planned eylea OD    Instructed to call 24/7 for any worsening of vision. Check Both eyes daily. Gave patient my home phone number.  Risks, benefits, and alternatives to treatment discussed in detail with the patient.  The patient voiced understanding and wished to proceed with the procedure.     Patient Identified and Time Out complete  Subconjunctival bleb - xylocaine with epi 2%   and Betadine.  Inject at Eylea (afibercept) 2 mg/0.05 ml Intravitreal Injection , OS 6:00  @ 3.5-4mm posterior to limbus  1 stop: no   Post Operative Dx: Same  Complications: None  Follow up as above.    ===============================

## 2022-02-16 ENCOUNTER — PATIENT OUTREACH (OUTPATIENT)
Dept: ADMINISTRATIVE | Facility: OTHER | Age: 77
End: 2022-02-16
Payer: MEDICARE

## 2022-02-16 NOTE — PROGRESS NOTES
Health Maintenance Due   Topic Date Due    TETANUS VACCINE  Never done    Shingles Vaccine (1 of 2) Never done    Diabetes Urine Screening  01/07/2021    Colorectal Cancer Screening  01/30/2021    Hemoglobin A1c  05/12/2021    Influenza Vaccine (1) 09/01/2021    PROSTATE-SPECIFIC ANTIGEN  11/12/2021    Lipid Panel  11/12/2021    Foot Exam  02/18/2022     Updates were requested from care everywhere.  Chart was reviewed for overdue Proactive [facility] (NETO) topics (CRS, Breast Cancer Screening, Eye exam)  Health Maintenance has been updated.  LINKS immunization registry triggered.  Immunizations were reconciled.

## 2022-02-17 ENCOUNTER — OFFICE VISIT (OUTPATIENT)
Dept: OPHTHALMOLOGY | Facility: CLINIC | Age: 77
End: 2022-02-17
Payer: MEDICARE

## 2022-02-17 ENCOUNTER — PROCEDURE VISIT (OUTPATIENT)
Dept: OPHTHALMOLOGY | Facility: CLINIC | Age: 77
End: 2022-02-17
Payer: MEDICARE

## 2022-02-17 DIAGNOSIS — E11.3513 TYPE 2 DIABETES MELLITUS WITH BOTH EYES AFFECTED BY PROLIFERATIVE RETINOPATHY AND MACULAR EDEMA, WITH LONG-TERM CURRENT USE OF INSULIN: ICD-10-CM

## 2022-02-17 DIAGNOSIS — Z96.1 PSEUDOPHAKIA: ICD-10-CM

## 2022-02-17 DIAGNOSIS — Z79.4 TYPE 2 DIABETES MELLITUS WITH BOTH EYES AFFECTED BY PROLIFERATIVE RETINOPATHY AND MACULAR EDEMA, WITH LONG-TERM CURRENT USE OF INSULIN: ICD-10-CM

## 2022-02-17 DIAGNOSIS — E11.3513 TYPE 2 DIABETES MELLITUS WITH BOTH EYES AFFECTED BY PROLIFERATIVE RETINOPATHY AND MACULAR EDEMA, WITH LONG-TERM CURRENT USE OF INSULIN: Primary | ICD-10-CM

## 2022-02-17 DIAGNOSIS — H40.62X4 GLAUCOMA OF LEFT EYE SECONDARY TO DRUGS, INDETERMINATE STAGE: Primary | ICD-10-CM

## 2022-02-17 DIAGNOSIS — Z79.4 TYPE 2 DIABETES MELLITUS WITH BOTH EYES AFFECTED BY PROLIFERATIVE RETINOPATHY AND MACULAR EDEMA, WITH LONG-TERM CURRENT USE OF INSULIN: Primary | ICD-10-CM

## 2022-02-17 PROCEDURE — 92134 POSTERIOR SEGMENT OCT RETINA (OCULAR COHERENCE TOMOGRAPHY)-BOTH EYES: ICD-10-PCS | Mod: 26,S$PBB,, | Performed by: OPHTHALMOLOGY

## 2022-02-17 PROCEDURE — 92083 HUMPHREY VISUAL FIELD - OU - BOTH EYES: ICD-10-PCS | Mod: 26,S$PBB,, | Performed by: OPHTHALMOLOGY

## 2022-02-17 PROCEDURE — 67028 PR INJECT INTRAVITREAL PHARMCOLOGIC: ICD-10-PCS | Mod: S$PBB,RT,, | Performed by: OPHTHALMOLOGY

## 2022-02-17 PROCEDURE — 99999 PR PBB SHADOW E&M-EST. PATIENT-LVL II: ICD-10-PCS | Mod: PBBFAC,,, | Performed by: OPHTHALMOLOGY

## 2022-02-17 PROCEDURE — 99499 NO LOS: ICD-10-PCS | Mod: S$PBB,,, | Performed by: OPHTHALMOLOGY

## 2022-02-17 PROCEDURE — 92134 CPTRZ OPH DX IMG PST SGM RTA: CPT | Mod: PBBFAC | Performed by: OPHTHALMOLOGY

## 2022-02-17 PROCEDURE — 92014 COMPRE OPH EXAM EST PT 1/>: CPT | Mod: 24,S$PBB,, | Performed by: OPHTHALMOLOGY

## 2022-02-17 PROCEDURE — 99499 UNLISTED E&M SERVICE: CPT | Mod: S$PBB,,, | Performed by: OPHTHALMOLOGY

## 2022-02-17 PROCEDURE — 67028 INJECTION EYE DRUG: CPT | Mod: S$PBB,RT,, | Performed by: OPHTHALMOLOGY

## 2022-02-17 PROCEDURE — 92083 EXTENDED VISUAL FIELD XM: CPT | Mod: PBBFAC | Performed by: OPHTHALMOLOGY

## 2022-02-17 PROCEDURE — 92014 PR EYE EXAM, EST PATIENT,COMPREHESV: ICD-10-PCS | Mod: 24,S$PBB,, | Performed by: OPHTHALMOLOGY

## 2022-02-17 PROCEDURE — 99212 OFFICE O/P EST SF 10 MIN: CPT | Mod: PBBFAC,25 | Performed by: OPHTHALMOLOGY

## 2022-02-17 PROCEDURE — 67028 INJECTION EYE DRUG: CPT | Mod: PBBFAC,RT | Performed by: OPHTHALMOLOGY

## 2022-02-17 PROCEDURE — 99999 PR PBB SHADOW E&M-EST. PATIENT-LVL II: CPT | Mod: PBBFAC,,, | Performed by: OPHTHALMOLOGY

## 2022-02-17 RX ORDER — DORZOLAMIDE HYDROCHLORIDE AND TIMOLOL MALEATE 20; 5 MG/ML; MG/ML
1 SOLUTION/ DROPS OPHTHALMIC 2 TIMES DAILY
Qty: 10 ML | Refills: 4 | Status: SHIPPED | OUTPATIENT
Start: 2022-02-17 | End: 2023-12-19 | Stop reason: SDUPTHER

## 2022-02-17 RX ORDER — BRIMONIDINE TARTRATE 2 MG/ML
1 SOLUTION/ DROPS OPHTHALMIC 2 TIMES DAILY
Qty: 5 ML | Refills: 11 | Status: SHIPPED | OUTPATIENT
Start: 2022-02-17 | End: 2022-03-31

## 2022-02-17 RX ADMIN — AFLIBERCEPT 2 MG: 40 INJECTION, SOLUTION INTRAVITREAL at 03:02

## 2022-02-17 NOTE — PROGRESS NOTES
===============================  Date today is 2/17/2022  Mono Bergman is a 76 y.o. male  Last visit [facility]: :2/10/2022   Last visit eye dept. 2/10/2022    Uncorrected distance visual acuity was 20/70 in the right eye and 20/200 in the left eye.  Tonometry     Tonometry (Applanation, 1:59 PM)       Right Left    Pressure 12 21          Max Pressure       Right Left    Max 22 31 (7/27/2021)              Not recorded       Not recorded       Not recorded       Chief Complaint   Patient presents with    PDR/ME     EYLEA OD       Problem List Items Addressed This Visit    None     Visit Diagnoses     Type 2 diabetes mellitus with both eyes affected by proliferative retinopathy and macular edema, with long-term current use of insulin    -  Primary    Relevant Medications    aflibercept Soln 2 mg (Completed) (Start on 2/17/2022  3:30 PM)    Other Relevant Orders    Posterior Segment OCT Retina-Both eyes (Completed)    Prior authorization Order          ________________  2/17/2022 today    Have d/c os injection lack of visual benefit  c  od for now oct sl worse   rtc 1 mo         Injection Procedure Note:    2/17/2022  Diagnosis :  OD DME  Today:   Eylea (afibercept) 2 mg/0.05 ml Intravitreal Injection , OD   Follow up: rtc 1 mo    Instructed to call 24/7 for any worsening of vision. Check Both eyes daily. Gave patient my home phone number.  Risks, benefits, and alternatives to treatment discussed in detail with the patient.  The patient voiced understanding and wished to proceed with the procedure.     Patient Identified and Time Out complete  Subconjunctival bleb - xylocaine with epi 2%   and Betadine.  Inject at Eylea (afibercept) 2 mg/0.05 ml Intravitreal Injection , OD 6:00 @ 3.5-4mm posterior to limbus  1 stop: no   Post Operative Dx: Same  Complications: None  Follow up as above.      ===============================

## 2022-02-17 NOTE — PROGRESS NOTES
SUBJECTIVE  Mono David Bergman is 76 y.o. male  Uncorrected distance visual acuity was 20/70 -1 in the right eye and 20/200 in the left eye.   Chief Complaint   Patient presents with    Glaucoma     Pt here for 4m HVF dilation. No pain or discomfort. [facility] stable. Pt says he ran out of most of his eye drops and only was taking the Cosopt.           HPI     Glaucoma      Additional comments: Pt here for 4m HVF dilation. No pain or discomfort.   VA stable. Pt says he ran out of most of his eye drops and only was taking   the Cosopt.               Comments     1.) + DM SINCE 1997   2.) + PDR OU/ CSME OS   Bilateral PRP   PPV OS with Dr. 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 TEARS QID OS PRN  REFRESH GEL BID OS   OMEGA E BID P.O.     Eylea OD last 1/6/22   Eylea OS last 2/10/22  Ozurdex OS 3/26/2021 (steroid responder - IOP in 40s)          Last edited by Geoffrey Coello MA on 2/17/2022  1:37 PM. (History)         Assessment /Plan :  1. 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 Better IOP control is recommended. Discussed options, risks, and benefits of additional medication, SLT laser, and/or incisional glaucoma surgery. Reviewed importance of continued compliance with treatment and follow up.     Patient chooses work on improving medication compliance and to resume Brimonidine one drop in the left eye every 12 hours, Dorzolamide/Timolol one drop in the left eye every 12 hours and Rhopressa one drop in the left eye every night    Return to clinic  in 6 weeks  or as needed.  With IOP Check     2. Pseudophakia  -- Condition stable, no therapeutic change required. Monitoring routinely.     3. Type 2 diabetes mellitus with both eyes affected by proliferative retinopathy and macular edema, with long-term current use of insulin - followed by Dr. Tucker

## 2022-02-21 ENCOUNTER — TELEPHONE (OUTPATIENT)
Dept: DIABETES | Facility: CLINIC | Age: 77
End: 2022-02-21
Payer: MEDICARE

## 2022-02-21 NOTE — TELEPHONE ENCOUNTER
----- Message from Temitope Garcia sent at 2/21/2022 10:58 AM CST -----  Contact: Argelia Sanders stating that the pt's dexcom is needing a new prescription , please give a call back at [phone]        .

## 2022-03-11 DIAGNOSIS — E11.9 TYPE 2 DIABETES MELLITUS WITH HEMOGLOBIN A1C GOAL OF LESS THAN 7.0%: ICD-10-CM

## 2022-03-11 NOTE — TELEPHONE ENCOUNTER
Pt do not need to come in and see provider. Pt has not seen provider since 2020. Left message on VM.

## 2022-03-11 NOTE — TELEPHONE ENCOUNTER
Pt prescription has . Faxed over chart notes for refill to [facility] at [facility].

## 2022-03-14 RX ORDER — BLOOD-GLUCOSE SENSOR
EACH MISCELLANEOUS
Qty: 1 EACH | Refills: 11 | OUTPATIENT
Start: 2022-03-14

## 2022-03-14 RX ORDER — BLOOD-GLUCOSE TRANSMITTER
EACH MISCELLANEOUS
Qty: 1 EACH | Refills: 3 | OUTPATIENT
Start: 2022-03-14

## 2022-03-14 RX ORDER — BLOOD-GLUCOSE,RECEIVER,CONT
EACH MISCELLANEOUS
Qty: 1 EACH | Refills: 0 | OUTPATIENT
Start: 2022-03-14

## 2022-03-17 DIAGNOSIS — E11.9 TYPE 2 DIABETES MELLITUS WITH HEMOGLOBIN A1C GOAL OF LESS THAN 7.0%: ICD-10-CM

## 2022-03-17 RX ORDER — BLOOD-GLUCOSE SENSOR
EACH MISCELLANEOUS
Qty: 1 EACH | Refills: 11 | Status: SHIPPED | OUTPATIENT
Start: 2022-03-17 | End: 2022-03-24 | Stop reason: SDUPTHER

## 2022-03-17 NOTE — TELEPHONE ENCOUNTER
----- Message from Maria Del Rosario Gama sent at 3/17/2022  9:35 AM CDT -----  Contact: Bobbi Monteiro-nurse  Calling in regards to getting a new script for blood-glucose transmitter (DEXCOM G6 TRANSMITTER) Calista and blood-glucose sensor (DEXCOM G6 SENSOR) Calista. Please call [phone]

## 2022-03-17 NOTE — TELEPHONE ENCOUNTER
Care Due:                  Date            Visit Type   Department     Provider  --------------------------------------------------------------------------------                                EP -                              PRIMARY      [facility] INTERNAL  Last Visit: 02-      CARE ([facility])   MEDICINE       Hermelindo Oseguera                              EP -                              PRIMARY      [facility] INTERNAL  Next Visit: 04-      CARE ([facility])   MEDICINE       Hermelindo Oseguera                                                            Last  Test          Frequency    Reason                     Performed    Due Date  --------------------------------------------------------------------------------    Cr..........  12 months..  insulin..................  11- 11-    HBA1C.......  6 months...  insulin..................  11- 05-    Powered by [employer] by [employer]. Reference number: [id number].   3/17/2022 9:44:40 AM CDT

## 2022-03-17 NOTE — TELEPHONE ENCOUNTER
Patient stated that [facility] [place] [facility] advised that a new script is needed for his Dexcom Device

## 2022-03-24 DIAGNOSIS — E11.9 TYPE 2 DIABETES MELLITUS WITH HEMOGLOBIN A1C GOAL OF LESS THAN 7.0%: ICD-10-CM

## 2022-03-24 RX ORDER — BLOOD-GLUCOSE,RECEIVER,CONT
EACH MISCELLANEOUS
Qty: 1 EACH | Refills: 0 | Status: SHIPPED | OUTPATIENT
Start: 2022-03-24 | End: 2022-04-06 | Stop reason: SDUPTHER

## 2022-03-24 RX ORDER — BLOOD-GLUCOSE TRANSMITTER
EACH MISCELLANEOUS
Qty: 1 EACH | Refills: 3 | Status: SHIPPED | OUTPATIENT
Start: 2022-03-24 | End: 2023-04-13 | Stop reason: SDUPTHER

## 2022-03-24 RX ORDER — BLOOD-GLUCOSE SENSOR
EACH MISCELLANEOUS
Qty: 1 EACH | Refills: 11 | Status: SHIPPED | OUTPATIENT
Start: 2022-03-24 | End: 2022-04-06 | Stop reason: SDUPTHER

## 2022-03-24 NOTE — TELEPHONE ENCOUNTER
----- Message from Alice Landershuy sent at 3/24/2022 10:44 AM CDT -----  Contact: [phone] @ [facility]  Good Morning,    Requesting an RX refill or new RX.  Is this a refill or new RX:   RX name and strength blood-glucose transmitter (DEXCOM G6 TRANSMITTER) Calista  Is this a 30 day or 90 day RX:   Pharmacy name and phone # [facility]  The doctors have asked that we provide their patients with the following 2 reminders -- prescription refills can take up to 72 hours, and a friendly reminder that in the future you can use your [facility] account to request refills: yes          Requesting an RX refill or new RX.  Is this a refill or new RX:   RX name and strength blood-glucose sensor (DEXCOM G6 SENSOR) Calista  Is this a 30 day or 90 day RX:   Pharmacy name and phone #[facility]  The doctors have asked that we provide their patients with the following 2 reminders -- prescription refills can take up to 72 hours, and a friendly reminder that in the future you can use your [employer] account to request refills: yes        Requesting an RX refill or new RX.  Is this a refill or new RX:   RX name and strength blood-glucose meter,continuous (DEXCOM G6 ) Misc  Is this a 30 day or 90 day RX:   Pharmacy name and phone #[facility]  The doctors have asked that we provide their patients with the following 2 reminders -- prescription refills can take up to 72 hours, and a friendly reminder that in the future you can use your [facility] account to request refills: yes            blood-glucose transmitter (DEXCOM G6 TRANSMITTER) Calista

## 2022-03-24 NOTE — TELEPHONE ENCOUNTER
No new care gaps identified.  Powered by [employer]. Reference number: [id number].   3/24/2022 11:07:12 AM CDT

## 2022-03-25 ENCOUNTER — TELEPHONE (OUTPATIENT)
Dept: DIABETES | Facility: CLINIC | Age: 77
End: 2022-03-25
Payer: MEDICARE

## 2022-03-25 NOTE — TELEPHONE ENCOUNTER
Pt need to see a Provider before ordering supplies. Left pt a message to schedule appointment. Awaiting on return call

## 2022-03-31 ENCOUNTER — TELEPHONE (OUTPATIENT)
Dept: INTERNAL MEDICINE | Facility: CLINIC | Age: 77
End: 2022-03-31
Payer: MEDICARE

## 2022-03-31 NOTE — TELEPHONE ENCOUNTER
----- Message from Jonathan Calvo sent at 3/31/2022 10:51 AM CDT -----  Contact: Argelia Stout ([facility]) would like to consult with nurse regarding diabetic supplies.  Argelia would like a new Rx for a Dexcom sent to [facility] @ [phone].  Please contact Argelia @ [phone].  Thanks/As

## 2022-04-06 ENCOUNTER — TELEPHONE (OUTPATIENT)
Dept: DIABETES | Facility: CLINIC | Age: 77
End: 2022-04-06
Payer: MEDICARE

## 2022-04-06 ENCOUNTER — PATIENT OUTREACH (OUTPATIENT)
Dept: ADMINISTRATIVE | Facility: OTHER | Age: 77
End: 2022-04-06
Payer: MEDICARE

## 2022-04-06 DIAGNOSIS — E11.9 TYPE 2 DIABETES MELLITUS WITH HEMOGLOBIN A1C GOAL OF LESS THAN 7.0%: ICD-10-CM

## 2022-04-06 RX ORDER — BLOOD-GLUCOSE SENSOR
EACH MISCELLANEOUS
Qty: 1 EACH | Refills: 11 | Status: SHIPPED | OUTPATIENT
Start: 2022-04-06 | End: 2023-04-13 | Stop reason: SDUPTHER

## 2022-04-06 RX ORDER — BLOOD-GLUCOSE,RECEIVER,CONT
EACH MISCELLANEOUS
Qty: 1 EACH | Refills: 0 | Status: SHIPPED | OUTPATIENT
Start: 2022-04-06 | End: 2023-05-03 | Stop reason: ALTCHOICE

## 2022-04-06 NOTE — TELEPHONE ENCOUNTER
No new care gaps identified.  Powered by [employer] by [employer]. Reference number: [id number].   4/06/2022 10:45:37 AM CDT

## 2022-04-06 NOTE — TELEPHONE ENCOUNTER
----- Message from Heriberto Butler sent at 4/6/2022 10:28 AM CDT -----  Argelia, with [facility], would like return call regarding pt's Dexcom prescription.  Please call back at [phone].   Md Humaira

## 2022-04-06 NOTE — TELEPHONE ENCOUNTER
----- Message from Jonathan Calvo sent at 4/6/2022 10:00 AM CDT -----  Contact: nidia Jacob ([facility]) would like to consult with nurse regarding CGM.  Please fax documents and most recent chart notes to [phone] or contact Nidia @ [phone].  Thanks/As

## 2022-04-07 ENCOUNTER — OFFICE VISIT (OUTPATIENT)
Dept: OPHTHALMOLOGY | Facility: CLINIC | Age: 77
End: 2022-04-07
Payer: MEDICARE

## 2022-04-07 ENCOUNTER — PROCEDURE VISIT (OUTPATIENT)
Dept: OPHTHALMOLOGY | Facility: CLINIC | Age: 77
End: 2022-04-07
Payer: MEDICARE

## 2022-04-07 DIAGNOSIS — E11.3513 TYPE 2 DIABETES MELLITUS WITH BOTH EYES AFFECTED BY PROLIFERATIVE RETINOPATHY AND MACULAR EDEMA, WITH LONG-TERM CURRENT USE OF INSULIN: Primary | ICD-10-CM

## 2022-04-07 DIAGNOSIS — Z96.1 PSEUDOPHAKIA: ICD-10-CM

## 2022-04-07 DIAGNOSIS — Z79.4 TYPE 2 DIABETES MELLITUS WITH BOTH EYES AFFECTED BY PROLIFERATIVE RETINOPATHY AND MACULAR EDEMA, WITH LONG-TERM CURRENT USE OF INSULIN: Primary | ICD-10-CM

## 2022-04-07 DIAGNOSIS — H40.62X4 GLAUCOMA OF LEFT EYE SECONDARY TO DRUGS, INDETERMINATE STAGE: Primary | ICD-10-CM

## 2022-04-07 PROCEDURE — 67028 INJECTION EYE DRUG: CPT | Mod: PBBFAC,RT | Performed by: OPHTHALMOLOGY

## 2022-04-07 PROCEDURE — 67028 PR INJECT INTRAVITREAL PHARMCOLOGIC: ICD-10-PCS | Mod: S$PBB,RT,, | Performed by: OPHTHALMOLOGY

## 2022-04-07 PROCEDURE — 99214 PR OFFICE/OUTPT VISIT, EST, LEVL IV, 30-39 MIN: ICD-10-PCS | Mod: 24,S$PBB,, | Performed by: OPHTHALMOLOGY

## 2022-04-07 PROCEDURE — 67028 INJECTION EYE DRUG: CPT | Mod: S$PBB,RT,, | Performed by: OPHTHALMOLOGY

## 2022-04-07 PROCEDURE — 99214 OFFICE O/P EST MOD 30 MIN: CPT | Mod: 24,S$PBB,, | Performed by: OPHTHALMOLOGY

## 2022-04-07 PROCEDURE — 99499 UNLISTED E&M SERVICE: CPT | Mod: S$PBB,,, | Performed by: OPHTHALMOLOGY

## 2022-04-07 PROCEDURE — 99999 PR PBB SHADOW E&M-EST. PATIENT-LVL I: ICD-10-PCS | Mod: PBBFAC,,, | Performed by: OPHTHALMOLOGY

## 2022-04-07 PROCEDURE — 99499 NO LOS: ICD-10-PCS | Mod: S$PBB,,, | Performed by: OPHTHALMOLOGY

## 2022-04-07 PROCEDURE — 92134 POSTERIOR SEGMENT OCT RETINA (OCULAR COHERENCE TOMOGRAPHY)-BOTH EYES: ICD-10-PCS | Mod: 26,S$PBB,, | Performed by: OPHTHALMOLOGY

## 2022-04-07 PROCEDURE — 99211 OFF/OP EST MAY X REQ PHY/QHP: CPT | Mod: PBBFAC,25 | Performed by: OPHTHALMOLOGY

## 2022-04-07 PROCEDURE — 99999 PR PBB SHADOW E&M-EST. PATIENT-LVL I: CPT | Mod: PBBFAC,,, | Performed by: OPHTHALMOLOGY

## 2022-04-07 PROCEDURE — 92134 CPTRZ OPH DX IMG PST SGM RTA: CPT | Mod: PBBFAC | Performed by: OPHTHALMOLOGY

## 2022-04-07 RX ADMIN — AFLIBERCEPT 2 MG: 40 INJECTION, SOLUTION INTRAVITREAL at 12:04

## 2022-04-07 NOTE — PROGRESS NOTES
===============================  Date today is 4/7/2022  Mono Bergman is a 76 y.o. male  Last visit [facility]: :2/17/2022   Last visit eye dept. 2/17/2022    Uncorrected distance visual acuity was 20/80 in the right eye and 20/200 in the left eye.  Tonometry     Tonometry (Applanation, 11:34 AM)       Right Left    Pressure 13 13          Max Pressure       Right Left    Max 22 31 (7/27/2021)              Not recorded       Not recorded       Not recorded       Chief Complaint   Patient presents with    PDR/ME     EYLEA OD       Problem List Items Addressed This Visit    None     Visit Diagnoses     Type 2 diabetes mellitus with both eyes affected by proliferative retinopathy and macular edema, with long-term current use of insulin    -  Primary    Relevant Medications    aflibercept Soln 2 mg (Completed) (Start on 4/7/2022  1:00 PM)    Other Relevant Orders    Posterior Segment OCT Retina-Both eyes (Completed)    Prior authorization Order          ________________  4/7/2022 today       Have d/c os injection lack of visual benefit  c  od for now oct sl worse   rtc 1 mo     OD DME  Oct worse od Today          Injection Procedure Note:    4/7/2022  Diagnosis :  od dme sl worse  Today:   Eylea (afibercept) 2 mg/0.05 ml Intravitreal Injection , OD   Follow up: rtc  1mo - do - worse today      Instructed to call 24/7 for any worsening of vision. Check Both eyes daily. Gave patient my home phone number.  Risks, benefits, and alternatives to treatment discussed in detail with the patient.  The patient voiced understanding and wished to proceed with the procedure.     Patient Identified and Time Out complete  Subconjunctival bleb - xylocaine with epi 2%   and Betadine.  Inject at Eylea (afibercept) 2 mg/0.05 ml Intravitreal Injection , OD 6:00 @ 3.5-4mm posterior to limbus  1 stop: no   Post Operative Dx: Same  Complications: None  Follow up as above.

## 2022-04-07 NOTE — PROGRESS NOTES
SUBJECTIVE  Mono Bergman is 76 y.o. male  Uncorrected distance visual acuity was 20/80 -1 in the right eye and 20/200 in the left eye.   Chief Complaint   Patient presents with    Glaucoma     Pt reports for 6wk IOP check before seeing [facility]. Denies any pain or irritation. Noticed some blurriness and feels disorientated with glasses on sometimes. [facility] stable. 100% compliant with gtts.          HPI     Glaucoma      Additional comments: Pt reports for 6wk IOP check before seeing [facility].   Denies any pain or irritation. Noticed some blurriness and feels   disorientated with glasses on sometimes. [facility] stable. 100% compliant with   gtts.              Comments     1.) + DM SINCE 1997   2.) + PDR OU/ CSME OS   Bilateral PRP   PPV OS with Dr. 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 TEARS QID OS PRN  REFRESH GEL BID OS   OMEGA E BID P.O.     Eylea OD last 1/6/22   Eylea OS last 2/10/22  Ozurdex OS 3/26/2021 (steroid responder - IOP in 40s)            Last edited by Mono Morales on 4/7/2022 11:02 AM. (History)         Assessment /Plan :  1. 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 Continue current treatment. Reviewed importance of continued compliance with treatment and follow up.      Patient instructed to continue using the following glaucoma medication as follows:  Rhopressa one drop in left eye nightly and Dorzolamide/Timolol (Cosopt) one drop in left eye every 12 hours, and Lotemax one drop in left eye every 12 hours.    Return to clinic in 3  months  or as needed.  With IOP Check and GOCT     2. Pseudophakia  -- Condition stable, no therapeutic change required. Monitoring routinely.

## 2022-04-12 ENCOUNTER — OFFICE VISIT (OUTPATIENT)
Dept: INTERNAL MEDICINE | Facility: CLINIC | Age: 77
End: 2022-04-12
Payer: MEDICARE

## 2022-04-12 ENCOUNTER — LAB VISIT (OUTPATIENT)
Dept: LAB | Facility: HOSPITAL | Age: 77
End: 2022-04-12
Attending: FAMILY MEDICINE
Payer: MEDICARE

## 2022-04-12 VITALS
SYSTOLIC BLOOD PRESSURE: 139 MMHG | WEIGHT: 225.06 LBS | OXYGEN SATURATION: 98 % | DIASTOLIC BLOOD PRESSURE: 89 MMHG | HEART RATE: 67 BPM | BODY MASS INDEX: 34.22 KG/M2 | TEMPERATURE: 97 F

## 2022-04-12 DIAGNOSIS — Z12.5 PROSTATE CANCER SCREENING: ICD-10-CM

## 2022-04-12 DIAGNOSIS — D64.9 ANEMIA, UNSPECIFIED TYPE: ICD-10-CM

## 2022-04-12 DIAGNOSIS — E11.59 HYPERTENSION ASSOCIATED WITH DIABETES: ICD-10-CM

## 2022-04-12 DIAGNOSIS — E11.69 DYSLIPIDEMIA ASSOCIATED WITH TYPE 2 DIABETES MELLITUS: ICD-10-CM

## 2022-04-12 DIAGNOSIS — G47.33 OSA ON CPAP: ICD-10-CM

## 2022-04-12 DIAGNOSIS — N18.31 STAGE 3A CHRONIC KIDNEY DISEASE: ICD-10-CM

## 2022-04-12 DIAGNOSIS — E78.5 DYSLIPIDEMIA ASSOCIATED WITH TYPE 2 DIABETES MELLITUS: ICD-10-CM

## 2022-04-12 DIAGNOSIS — I15.2 HYPERTENSION ASSOCIATED WITH DIABETES: ICD-10-CM

## 2022-04-12 DIAGNOSIS — E11.3513 TYPE 2 DIABETES MELLITUS WITH BOTH EYES AFFECTED BY PROLIFERATIVE RETINOPATHY AND MACULAR EDEMA, WITH LONG-TERM CURRENT USE OF INSULIN: ICD-10-CM

## 2022-04-12 DIAGNOSIS — Z79.4 TYPE 2 DIABETES MELLITUS WITH BOTH EYES AFFECTED BY PROLIFERATIVE RETINOPATHY AND MACULAR EDEMA, WITH LONG-TERM CURRENT USE OF INSULIN: Primary | ICD-10-CM

## 2022-04-12 DIAGNOSIS — E11.3513 TYPE 2 DIABETES MELLITUS WITH BOTH EYES AFFECTED BY PROLIFERATIVE RETINOPATHY AND MACULAR EDEMA, WITH LONG-TERM CURRENT USE OF INSULIN: Primary | ICD-10-CM

## 2022-04-12 DIAGNOSIS — Z79.4 TYPE 2 DIABETES MELLITUS WITH BOTH EYES AFFECTED BY PROLIFERATIVE RETINOPATHY AND MACULAR EDEMA, WITH LONG-TERM CURRENT USE OF INSULIN: ICD-10-CM

## 2022-04-12 LAB
ALBUMIN SERPL BCP-MCNC: 3.5 G/DL (ref 3.5–5.2)
ALP SERPL-CCNC: 181 U/L (ref 55–135)
ALT SERPL W/O P-5'-P-CCNC: 18 U/L (ref 10–44)
ANION GAP SERPL CALC-SCNC: 9 MMOL/L (ref 8–16)
AST SERPL-CCNC: 21 U/L (ref 10–40)
BASOPHILS # BLD AUTO: 0.08 K/UL (ref 0–0.2)
BASOPHILS NFR BLD: 0.9 % (ref 0–1.9)
BILIRUB SERPL-MCNC: 0.5 MG/DL (ref 0.1–1)
BUN SERPL-MCNC: 18 MG/DL (ref 8–23)
CALCIUM SERPL-MCNC: 9.2 MG/DL (ref 8.7–10.5)
CHLORIDE SERPL-SCNC: 105 MMOL/L (ref 95–110)
CHOLEST SERPL-MCNC: 118 MG/DL (ref 120–199)
CHOLEST/HDLC SERPL: 4.2 {RATIO} (ref 2–5)
CO2 SERPL-SCNC: 29 MMOL/L (ref 23–29)
COMPLEXED PSA SERPL-MCNC: 3.9 NG/ML (ref 0–4)
CREAT SERPL-MCNC: 1.9 MG/DL (ref 0.5–1.4)
DIFFERENTIAL METHOD: ABNORMAL
EOSINOPHIL # BLD AUTO: 0.3 K/UL (ref 0–0.5)
EOSINOPHIL NFR BLD: 3.8 % (ref 0–8)
ERYTHROCYTE [DISTWIDTH] IN BLOOD BY AUTOMATED COUNT: 13.1 % (ref 11.5–14.5)
EST. GFR  (AFRICAN AMERICAN): 39.3 ML/MIN/1.73 M^2
EST. GFR  (NON AFRICAN AMERICAN): 34 ML/MIN/1.73 M^2
ESTIMATED AVG GLUCOSE: 177 MG/DL (ref 68–131)
FERRITIN SERPL-MCNC: 71 NG/ML (ref 20–300)
GLUCOSE SERPL-MCNC: 60 MG/DL (ref 70–110)
HBA1C MFR BLD: 7.8 % (ref 4–5.6)
HCT VFR BLD AUTO: 40.2 % (ref 40–54)
HDLC SERPL-MCNC: 28 MG/DL (ref 40–75)
HDLC SERPL: 23.7 % (ref 20–50)
HGB BLD-MCNC: 13.1 G/DL (ref 14–18)
IMM GRANULOCYTES # BLD AUTO: 0.03 K/UL (ref 0–0.04)
IMM GRANULOCYTES NFR BLD AUTO: 0.3 % (ref 0–0.5)
IRON SERPL-MCNC: 50 UG/DL (ref 45–160)
LDLC SERPL CALC-MCNC: 63.6 MG/DL (ref 63–159)
LYMPHOCYTES # BLD AUTO: 2.5 K/UL (ref 1–4.8)
LYMPHOCYTES NFR BLD: 27.8 % (ref 18–48)
MCH RBC QN AUTO: 28.4 PG (ref 27–31)
MCHC RBC AUTO-ENTMCNC: 32.6 G/DL (ref 32–36)
MCV RBC AUTO: 87 FL (ref 82–98)
MONOCYTES # BLD AUTO: 0.8 K/UL (ref 0.3–1)
MONOCYTES NFR BLD: 9.3 % (ref 4–15)
NEUTROPHILS # BLD AUTO: 5.2 K/UL (ref 1.8–7.7)
NEUTROPHILS NFR BLD: 57.9 % (ref 38–73)
NONHDLC SERPL-MCNC: 90 MG/DL
NRBC BLD-RTO: 0 /100 WBC
PLATELET # BLD AUTO: 226 K/UL (ref 150–450)
PMV BLD AUTO: 10.2 FL (ref 9.2–12.9)
POTASSIUM SERPL-SCNC: 4.3 MMOL/L (ref 3.5–5.1)
PROT SERPL-MCNC: 7.3 G/DL (ref 6–8.4)
RBC # BLD AUTO: 4.61 M/UL (ref 4.6–6.2)
SATURATED IRON: 18 % (ref 20–50)
SODIUM SERPL-SCNC: 143 MMOL/L (ref 136–145)
TOTAL IRON BINDING CAPACITY: 272 UG/DL (ref 250–450)
TRANSFERRIN SERPL-MCNC: 184 MG/DL (ref 200–375)
TRIGL SERPL-MCNC: 132 MG/DL (ref 30–150)
TSH SERPL DL<=0.005 MIU/L-ACNC: 1.75 UIU/ML (ref 0.4–4)
WBC # BLD AUTO: 9.04 K/UL (ref 3.9–12.7)

## 2022-04-12 PROCEDURE — 99999 PR PBB SHADOW E&M-EST. PATIENT-LVL V: ICD-10-PCS | Mod: PBBFAC,,, | Performed by: FAMILY MEDICINE

## 2022-04-12 PROCEDURE — 99215 OFFICE O/P EST HI 40 MIN: CPT | Mod: PBBFAC,PO | Performed by: FAMILY MEDICINE

## 2022-04-12 PROCEDURE — 85025 COMPLETE CBC W/AUTO DIFF WBC: CPT | Performed by: FAMILY MEDICINE

## 2022-04-12 PROCEDURE — 99999 PR PBB SHADOW E&M-EST. PATIENT-LVL V: CPT | Mod: PBBFAC,,, | Performed by: FAMILY MEDICINE

## 2022-04-12 PROCEDURE — 80053 COMPREHEN METABOLIC PANEL: CPT | Performed by: FAMILY MEDICINE

## 2022-04-12 PROCEDURE — 36415 COLL VENOUS BLD VENIPUNCTURE: CPT | Mod: PO | Performed by: FAMILY MEDICINE

## 2022-04-12 PROCEDURE — 84443 ASSAY THYROID STIM HORMONE: CPT | Performed by: FAMILY MEDICINE

## 2022-04-12 PROCEDURE — 99214 OFFICE O/P EST MOD 30 MIN: CPT | Mod: S$PBB,,, | Performed by: FAMILY MEDICINE

## 2022-04-12 PROCEDURE — 99214 PR OFFICE/OUTPT VISIT, EST, LEVL IV, 30-39 MIN: ICD-10-PCS | Mod: S$PBB,,, | Performed by: FAMILY MEDICINE

## 2022-04-12 PROCEDURE — 84153 ASSAY OF PSA TOTAL: CPT | Performed by: FAMILY MEDICINE

## 2022-04-12 PROCEDURE — 84466 ASSAY OF TRANSFERRIN: CPT | Performed by: FAMILY MEDICINE

## 2022-04-12 PROCEDURE — 80061 LIPID PANEL: CPT | Performed by: FAMILY MEDICINE

## 2022-04-12 PROCEDURE — 82728 ASSAY OF FERRITIN: CPT | Performed by: FAMILY MEDICINE

## 2022-04-12 PROCEDURE — 83036 HEMOGLOBIN GLYCOSYLATED A1C: CPT | Performed by: FAMILY MEDICINE

## 2022-04-12 RX ORDER — ONDANSETRON 4 MG/1
4 TABLET, ORALLY DISINTEGRATING ORAL EVERY 8 HOURS PRN
Qty: 20 TABLET | Refills: 0 | Status: SHIPPED | OUTPATIENT
Start: 2022-04-12 | End: 2022-04-12 | Stop reason: SDUPTHER

## 2022-04-12 RX ORDER — ONDANSETRON 4 MG/1
4 TABLET, ORALLY DISINTEGRATING ORAL EVERY 8 HOURS PRN
Qty: 60 TABLET | Refills: 1 | Status: SHIPPED | OUTPATIENT
Start: 2022-04-12

## 2022-04-12 RX ORDER — PANTOPRAZOLE SODIUM 40 MG/1
40 TABLET, DELAYED RELEASE ORAL DAILY
Qty: 90 TABLET | Refills: 1 | Status: SHIPPED | OUTPATIENT
Start: 2022-04-12 | End: 2023-09-11 | Stop reason: SDUPTHER

## 2022-04-12 RX ORDER — PANTOPRAZOLE SODIUM 40 MG/1
40 TABLET, DELAYED RELEASE ORAL DAILY
Qty: 30 TABLET | Refills: 0 | Status: SHIPPED | OUTPATIENT
Start: 2022-04-12 | End: 2022-04-12 | Stop reason: SDUPTHER

## 2022-04-12 NOTE — PROGRESS NOTES
Subjective:      Patient ID: Mono Bergman is a 76 y.o. male.    Chief Complaint: Bloated    HPI 76 y.o.   male patient with a PMHx of arthritis, cataract, DM, Diabetic retinopathy, colon polyps, GERD, HLD, JOEL on CPAP, and HTN presents to clinic for annual exam. The patient was last seen on February 18, 2021      Today he reports that he is feeling well. Patient has been taking medications as prescribed.    He is living at [facility] and has nurses managing his medication.  [facility]    He has not had DEXCOM past 5 wks.    He has not been using PPI as far as he knows and he is asking for refill as well as zofran to have.  Uses stool softeners to help keep bowels move.  [facility].  He states they started a daily med, sounds SSRI.  Unsure of med.    Feels fatigued.  Has walker that uses for ambulation.    Patient denies chest pain, SOB and bowel changes.     Past Medical History:   Diagnosis Date    Arthritis     Cataract     Colon polyps 10/29/13    Diabetes mellitus type II     Diabetic retinopathy     GERD (gastroesophageal reflux disease) 7/18/2013    Hyperlipidemia LDL goal < 70 7/18/2013    Hypertension     Low BP    JOEL on CPAP     Uveitis 5/11/2016     Family History   Problem Relation Age of Onset    Heart disease Mother     Colon cancer Neg Hx      Past Surgical History:   Procedure Laterality Date    CATARACT EXTRACTION W/  INTRAOCULAR LENS IMPLANT Left 12/10/14    CATARACT EXTRACTION W/  INTRAOCULAR LENS IMPLANT Right 07/13/2016    CHOLECYSTECTOMY      COLONOSCOPY N/A 2/3/2017    Procedure: COLONOSCOPY;  Surgeon: Natan Magdaleno MD;  [facility];  Service: Endoscopy;  Laterality: N/A;    EYE SURGERY      KNEE SURGERY  1971    left knee    rk rt. eye       Social History     Tobacco Use    Smoking status: Never Smoker    Smokeless tobacco: Never Used   Substance Use Topics    Alcohol use: No    Drug use: No        /89   Pulse 67   Temp 97.2 °F (36.2 °C)   Wt 102.1 kg (225 lb 1.4 oz)   SpO2 98%   BMI 34.22 kg/m²     Review of Systems   Constitutional: Negative for activity change, appetite change, chills, diaphoresis, fatigue, fever and unexpected weight change.   HENT: Negative for hearing loss and tinnitus.    Eyes: Negative for visual disturbance.   Respiratory: Negative for cough, shortness of breath and wheezing.    Cardiovascular: Negative for chest pain, palpitations and leg swelling.   Gastrointestinal: Negative for abdominal distention, abdominal pain, constipation and diarrhea.   Genitourinary: Negative for dysuria, frequency, hematuria and urgency.   Musculoskeletal: Negative for back pain and joint swelling.   Skin: Negative for color change and rash.   Neurological: Negative for weakness and headaches.   Hematological: Negative for adenopathy.   Psychiatric/Behavioral: Negative for confusion and decreased concentration.       Objective:     Physical Exam  Vitals and nursing note reviewed.   Constitutional:       General: He is not in acute distress.  HENT:      Right Ear: External ear normal.      Left Ear: External ear normal.   Eyes:      Conjunctiva/sclera: Conjunctivae normal.      Pupils: Pupils are equal, round, and reactive to light.   Cardiovascular:      Rate and Rhythm: Normal rate and regular rhythm.      Heart sounds: Normal heart sounds.   Pulmonary:      Effort: Pulmonary effort is normal. No respiratory distress.      Breath sounds: Normal breath sounds. No wheezing or rales.   Abdominal:      General: Bowel sounds are normal. There is no distension.      Palpations: Abdomen is soft.      Tenderness: There is no abdominal tenderness. There is no guarding.   Musculoskeletal:      Cervical back: Normal range of motion and neck supple.      Right lower leg: No edema.      Left lower leg: No edema.   Skin:     General: Skin is warm and dry.      Findings: No rash.   Neurological:       Mental Status: He is alert and oriented to person, place, and time.   Psychiatric:         Mood and Affect: Mood normal.         Behavior: Behavior normal.         Thought Content: Thought content normal.         Judgment: Judgment normal.         Lab Results   Component Value Date    WBC 7.00 11/12/2020    HGB 11.7 (L) 11/12/2020    HCT 36.4 (L) 11/12/2020     11/12/2020    CHOL 145 11/12/2020    TRIG 86 11/12/2020    HDL 39 (L) 11/12/2020    ALT 39 11/12/2020    AST 33 11/12/2020     11/12/2020    K 3.8 11/12/2020     11/12/2020    CREATININE 1.5 (H) 11/12/2020    BUN 23 11/12/2020    CO2 28 11/12/2020    PSA 2.0 11/12/2020    INR 1.0 05/01/2017    HGBA1C 6.5 (H) 11/12/2020       Assessment:     1. Type 2 diabetes mellitus with both eyes affected by proliferative retinopathy and macular edema, with long-term current use of insulin    2. Hypertension associated with diabetes    3. Dyslipidemia associated with type 2 diabetes mellitus    4. Anemia, unspecified type    5. Stage 3a chronic kidney disease    6. Prostate cancer screening    7. JOEL on CPAP         Plan:     Type 2 diabetes mellitus with both eyes affected by proliferative retinopathy and macular edema, with long-term current use of insulin  -     Hemoglobin A1C; Future; Expected date: 04/12/2022  -     Microalbumin/Creatinine Ratio, Urine; Future; Expected date: 04/12/2022  -     Ambulatory referral/consult to Podiatry; Future; Expected date: 04/19/2022    Hypertension associated with diabetes  -     CBC Auto Differential; Future; Expected date: 04/12/2022  -     Comprehensive Metabolic Panel; Future; Expected date: 04/12/2022    Dyslipidemia associated with type 2 diabetes mellitus  -     Lipid Panel; Future; Expected date: 04/12/2022  -     TSH; Future; Expected date: 04/12/2022    Anemia, unspecified type  -     Ferritin; Future; Expected date: 04/12/2022  -     Iron and TIBC; Future; Expected date: 04/12/2022    Stage 3a chronic  kidney disease    Prostate cancer screening  -     PSA, Screening; Future; Expected date: 04/12/2022    JOEL on CPAP    Other orders  -     Discontinue: ondansetron (ZOFRAN-ODT) 4 MG TbDL; Take 1 tablet (4 mg total) by mouth every 8 (eight) hours as needed (Nausea).  Dispense: 20 tablet; Refill: 0  -     Discontinue: pantoprazole (PROTONIX) 40 MG tablet; Take 1 tablet (40 mg total) by mouth once daily.  Dispense: 30 tablet; Refill: 0  -     pantoprazole (PROTONIX) 40 MG tablet; Take 1 tablet (40 mg total) by mouth once daily.  Dispense: 90 tablet; Refill: 1  -     ondansetron (ZOFRAN-ODT) 4 MG TbDL; Take 1 tablet (4 mg total) by mouth every 8 (eight) hours as needed (Nausea).  Dispense: 60 tablet; Refill: 1    DM has been stable in past//doesn't have DEXCOM right now//have sent script.  Pt seeing DM soon.  Vitals reviewed.   Patient due for annual labs and urine  Refill PPI to use daily  Zofran to have PRN  Nurse to call [facility] to get update med list.    Patient with chronic kidney disease. He is to increase water intake and avoid anti-inflammatory drug use for pain. Tylenol is fine.     With advancing age, he is to avoid any strenuous activities that could lead to falls or injuries.     Medications refilled.     Questions and concerns addressed.          Documentation entered by Can Schmidt, acting as scribe for Dr. Hermelindo Oseguera. 04/12/2022 11:23 AM.

## 2022-04-13 ENCOUNTER — PATIENT MESSAGE (OUTPATIENT)
Dept: INTERNAL MEDICINE | Facility: CLINIC | Age: 77
End: 2022-04-13
Payer: MEDICARE

## 2022-04-13 DIAGNOSIS — R97.20 ELEVATED PSA: Primary | ICD-10-CM

## 2022-04-13 DIAGNOSIS — D50.9 IRON DEFICIENCY ANEMIA, UNSPECIFIED IRON DEFICIENCY ANEMIA TYPE: ICD-10-CM

## 2022-04-13 DIAGNOSIS — E11.65 UNCONTROLLED TYPE 2 DIABETES MELLITUS WITH HYPERGLYCEMIA: ICD-10-CM

## 2022-04-13 NOTE — TELEPHONE ENCOUNTER
Let pt know A1C is up some at 7.8//likely b/c he has not had dexcom.  He has appt with DM already.  His iron levels are a bit low, so he needs to start replacement.  Over the counter 65mg daily is plenty.  PSA for prostate level is up, went up more than 1 point in a year so I generally recommend seeing Urology for this.  Referral has been placed.  Pt to f/u in 3mos with Astrid instead of 6 mos.  Lab orders in to do before that visit.

## 2022-04-13 NOTE — TELEPHONE ENCOUNTER
Called pt about results but he said I had to contact his nurse to discuss further info. Called pt's nurse but didn't get a answer. Left a vm to give us a call back.

## 2022-04-21 ENCOUNTER — TELEPHONE (OUTPATIENT)
Dept: INTERNAL MEDICINE | Facility: CLINIC | Age: 77
End: 2022-04-21
Payer: MEDICARE

## 2022-04-21 NOTE — TELEPHONE ENCOUNTER
----- Message from Brook Stark sent at 4/21/2022 11:47 AM CDT -----  Care giver (Ramona Ahn ) [phone] stopped in clinic for copy of lab work results printed for her to . She is care giver. Call when ready for . tc

## 2022-04-26 ENCOUNTER — OFFICE VISIT (OUTPATIENT)
Dept: DIABETES | Facility: CLINIC | Age: 77
End: 2022-04-26
Payer: MEDICARE

## 2022-04-26 VITALS
WEIGHT: 231.25 LBS | HEART RATE: 71 BPM | BODY MASS INDEX: 35.16 KG/M2 | DIASTOLIC BLOOD PRESSURE: 70 MMHG | SYSTOLIC BLOOD PRESSURE: 108 MMHG

## 2022-04-26 DIAGNOSIS — E11.3513 PROLIFERATIVE DIABETIC RETINOPATHY OF BOTH EYES WITH MACULAR EDEMA ASSOCIATED WITH TYPE 2 DIABETES MELLITUS: ICD-10-CM

## 2022-04-26 DIAGNOSIS — Z79.4 UNCONTROLLED TYPE 2 DIABETES MELLITUS WITH HYPERGLYCEMIA, WITH LONG-TERM CURRENT USE OF INSULIN: Primary | ICD-10-CM

## 2022-04-26 DIAGNOSIS — E78.5 HYPERLIPIDEMIA ASSOCIATED WITH TYPE 2 DIABETES MELLITUS: ICD-10-CM

## 2022-04-26 DIAGNOSIS — E11.65 UNCONTROLLED TYPE 2 DIABETES MELLITUS WITH HYPERGLYCEMIA, WITH LONG-TERM CURRENT USE OF INSULIN: Primary | ICD-10-CM

## 2022-04-26 DIAGNOSIS — I15.2 HYPERTENSION ASSOCIATED WITH DIABETES: ICD-10-CM

## 2022-04-26 DIAGNOSIS — E11.69 HYPERLIPIDEMIA ASSOCIATED WITH TYPE 2 DIABETES MELLITUS: ICD-10-CM

## 2022-04-26 DIAGNOSIS — E11.59 HYPERTENSION ASSOCIATED WITH DIABETES: ICD-10-CM

## 2022-04-26 LAB — GLUCOSE SERPL-MCNC: 220 MG/DL (ref 70–110)

## 2022-04-26 PROCEDURE — 99214 PR OFFICE/OUTPT VISIT, EST, LEVL IV, 30-39 MIN: ICD-10-PCS | Mod: S$PBB,,, | Performed by: NURSE PRACTITIONER

## 2022-04-26 PROCEDURE — 82962 GLUCOSE BLOOD TEST: CPT | Mod: PBBFAC | Performed by: NURSE PRACTITIONER

## 2022-04-26 PROCEDURE — 99215 OFFICE O/P EST HI 40 MIN: CPT | Mod: PBBFAC | Performed by: NURSE PRACTITIONER

## 2022-04-26 PROCEDURE — 99214 OFFICE O/P EST MOD 30 MIN: CPT | Mod: S$PBB,,, | Performed by: NURSE PRACTITIONER

## 2022-04-26 PROCEDURE — 99999 PR PBB SHADOW E&M-EST. PATIENT-LVL V: ICD-10-PCS | Mod: PBBFAC,,, | Performed by: NURSE PRACTITIONER

## 2022-04-26 PROCEDURE — 99999 PR PBB SHADOW E&M-EST. PATIENT-LVL V: CPT | Mod: PBBFAC,,, | Performed by: NURSE PRACTITIONER

## 2022-04-26 NOTE — PATIENT INSTRUCTIONS
Medications adjusted for today's visit include:    Continue Lantus 50 units daily.    Continue Novolog 12 units three times a day.     Re-start Dexcom.    --Carry glucose tablets/soft peppermints/regular juice or Coke product with you at all times to treat a low blood sugar episode (less than 70). If your blood sugar is between 50-70, Chew 4 tablets or drink 1/2 cup of juice or regular Coke product. If your blood sugar is below 50, double the treatment. Re-check blood sugar in 15 minutes. If still low, repeat this. Always call the clinic to give an update for any low blood sugar episodes.    --Follow-up for your next visit in 3 months    --Please either drop off, fax, or MyChart your readings to me as needed.

## 2022-04-26 NOTE — PROGRESS NOTES
Subjective:         Patient ID: Mono Bergman is a 76 y.o. male.  Patient's current PCP is Hermelindo Ash MD.     Chief Complaint: Diabetes Mellitus ([facility] )    HPI  Mono Bergman is a 76 y.o. White male presenting for a new consult with me, previously seen by Catalina Hagan for diabetes. Patient has been diagnosed with type 2 diabetes for several years. Diagnosed about 30 years ago -working for the  at the time-syncopal episode and reports glucose was > 500 at that time.    CURRENT DM MEDICATIONS:   Diabetes Medications             insulin aspart U-100 (NOVOLOG) 100 unit/mL (3 mL) InPn pen Inject 12 Units into the skin 3 (three) times daily with meals.    insulin glargine (LANTUS) 100 unit/mL injection Inject 50 Units into the skin every evening.        Past failed treatment include: Glyburide, Metformin, and 70/30    Blood glucose testing is performed regularly. Patient is testing 4 times per day.  Meter: Needs Dexcom G6 re-ordered ([place])  Preferred lab: [facility] preferred    Any episodes of hypoglycemia? Denies    Complications related to diabetes: retinopathy and peripheral neuropathy    His blood sugar in the clinic today was:   Lab Results   Component Value Date    POCGLU 220 (A) 04/26/2022       Mono Bergman presents today for follow up visit to discuss diabetes management. Remote visit- last seen 2020. Checking BGs 4x daily. Glucose range  mg/dL. Has been on the same doses for years and states glycemic control has remained at goal except when out of Dexcom.     [facility]     Has caregiver Ryannoel-Thurs. Assists with meds and transportation to [facility]. Lives at [facility].    DR- Followed by Dr. Tucker.    Current diet: Diabetic  Activity Level: Sednetary    Lab Results   Component Value Date    HGBA1C 7.8 (H) 04/12/2022    HGBA1C 6.5 (H) 11/12/2020    HGBA1C 6.2 (H) 05/13/2020       STANDARDS OF CARE  Diabetes Management  Status    Statin: Taking  ACE/ARB: Not taking    Screening or Prevention Patient's value Goal Complete/Controlled?   HgA1C Testing and Control   Lab Results   Component Value Date    HGBA1C 7.8 (H) 04/12/2022      Annually/Less than 8% Yes   Lipid profile : 04/12/2022 Annually Yes   LDL control Lab Results   Component Value Date    LDLCALC 63.6 04/12/2022    Annually/Less than 100 mg/dl  Yes   Nephropathy screening Lab Results   Component Value Date    LABMICR 114.0 01/07/2020     No results found for: PROTEINUA  No results found for: UTPCR   Annually No   Blood pressure BP Readings from Last 1 Encounters:   04/26/22 108/70    Less than 140/90 Yes   Dilated retinal exam : 02/17/2022 Annually Yes   Foot exam   : 04/26/2022 Annually Yes       Labs reviewed and are noted below.    Lab Results   Component Value Date    WBC 9.04 04/12/2022    HGB 13.1 (L) 04/12/2022    HCT 40.2 04/12/2022     04/12/2022    CHOL 118 (L) 04/12/2022    TRIG 132 04/12/2022    HDL 28 (L) 04/12/2022    LDLCALC 63.6 04/12/2022    ALT 18 04/12/2022    AST 21 04/12/2022     04/12/2022    K 4.3 04/12/2022     04/12/2022    ANIONGAP 9 04/12/2022    CREATININE 1.9 (H) 04/12/2022    ESTGFRAFRICA 39.3 (A) 04/12/2022    EGFRNONAA 34.0 (A) 04/12/2022    BUN 18 04/12/2022    CO2 29 04/12/2022    TSH 1.746 04/12/2022    PSA 3.9 04/12/2022    INR 1.0 05/01/2017    GLU 60 (L) 04/12/2022     Lab Results   Component Value Date    TSH 1.746 04/12/2022    IRON 50 04/12/2022    TIBC 272 04/12/2022    FERRITIN 71 04/12/2022    CALCIUM 9.2 04/12/2022     No results found for: CPEPTIDE  No results found for: GLUTAMICACID  Glucose   Date Value Ref Range Status   04/12/2022 60 (L) 70 - 110 mg/dL Final     Anion Gap   Date Value Ref Range Status   04/12/2022 9 8 - 16 mmol/L Final     eGFR if    Date Value Ref Range Status   04/12/2022 39.3 (A) >60 mL/min/1.73 m^2 Final     eGFR if non    Date Value Ref Range Status    04/12/2022 34.0 (A) >60 mL/min/1.73 m^2 Final     Comment:     Calculation used to obtain the estimated glomerular filtration  rate (eGFR) is the CKD-EPI equation.          The following portions of the patient's history were reviewed and updated as appropriate: allergies, current medications, past family history, past medical history, past social history, past surgical history and problem list.    Review of patient's allergies indicates:  No Known Allergies  Social History     Socioeconomic History    Marital status: Single    Number of children: 0   Occupational History    Occupation: retired     Employer: retired   Tobacco Use    Smoking status: Never Smoker    Smokeless tobacco: Never Used   Substance and Sexual Activity    Alcohol use: No    Drug use: No    Sexual activity: Not Currently     Past Medical History:   Diagnosis Date    Arthritis     Cataract     Colon polyps 10/29/13    Diabetes mellitus type II     Diabetic retinopathy     GERD (gastroesophageal reflux disease) 7/18/2013    Hyperlipidemia LDL goal < 70 7/18/2013    Hypertension     Low BP    JOEL on CPAP     Uveitis 5/11/2016       REVIEW OF SYSTEMS:  Eyes History of DR.  Cardiovascular: History of HTN and hyperlipidemia.  GI:History of GERD.  : No CKD.  Neuro: Positive neuropathy.  PSYCH: No tobacco use.  ENDO: See HPI.        Objective:      Vitals:    04/26/22 1328   BP: 108/70   Pulse: 71     RESPIRATORY: No respiratory distress  NEUROLOGIC: Cranial nerves II-XII grossly intact.   PSYCHIATRIC: Alert & oriented x3. Normal mood and affect.  FOOT EXAMINATION: Protective Sensation (w/ 10 gram monofilament):  Right: Intact  Left: Absent    Visual Inspection:  Normal -  Bilateral and Nails Intact - without Evidence of Foot Deformity- Bilateral    Pedal Pulses:   Right: Present  Left: Present    Assessment:       1. Uncontrolled type 2 diabetes mellitus with hyperglycemia, with long-term current use of insulin    2.  "Proliferative diabetic retinopathy of both eyes with macular edema associated with type 2 diabetes mellitus    3. Hypertension associated with diabetes    4. Hyperlipidemia associated with type 2 diabetes mellitus        Plan:   Uncontrolled type 2 diabetes mellitus with hyperglycemia, with long-term current use of insulin  -     POCT Glucose, Hand-Held Device    Chronic,Worsening since off of Dexcom.    Medications adjusted for today's visit include:    Continue Lantus 50 units daily.    Continue Novolog 12 units three times a day.     Re-start Dexcom.    Proliferative diabetic retinopathy of both eyes with macular edema associated with type 2 diabetes mellitus    Chronic,stable. Follow up with ophthalmology as advised.    Hypertension associated with diabetes    Chronic,stable. BP well controlled. Continue current medications.    Hyperlipidemia associated with type 2 diabetes mellitus    Chronic,stable. Continue Lipitor.    - Follow up: 3 months    I spent a total of 30 minutes on the day of the visit.This includes face to face time and non-face to face time preparing to see the patient (eg, review of tests), documenting clinical information in the electronic record, independently interpreting results and communicating results to the patient.    Portions of this note may have been created with voice recognition software. Occasional "wrong-word" or "sound-a-like" substitutions may have occurred due to the inherent limitations of voice recognition software. Please, read the note carefully and recognize, using context, where substitutions have occurred.           Sheri Ammons,FNP-C [facility]    "

## 2022-04-27 ENCOUNTER — TELEPHONE (OUTPATIENT)
Dept: DIABETES | Facility: CLINIC | Age: 77
End: 2022-04-27
Payer: MEDICARE

## 2022-04-27 NOTE — TELEPHONE ENCOUNTER
Noted, however we are currently out of sensors here at the [facility] so he would need to stop by the [facility] office next Friday. No one is there Thursday. Or, I can bring a sample back with me so he can  from the [facility] on Thurs if that works better for him. Let me know.

## 2022-04-27 NOTE — TELEPHONE ENCOUNTER
Pt called and stated the nurse  misplace the code to start sensor for the Dexcom to be started. Please advise

## 2022-04-27 NOTE — TELEPHONE ENCOUNTER
----- Message from Essence Hernandez sent at 4/27/2022 10:52 AM CDT -----  Contact: [phone]  Calling regarding pt, states pt lost his sugar monitor. Please call back at [phone]. Thanks/dj

## 2022-04-27 NOTE — TELEPHONE ENCOUNTER
If patient is missing the code on the sensor, will either have to calibrate the sensor every 12 hours OR will have to wait to get his new sensors as we are out of samples here at the [facility]. I do have samples at the [facility] location but I am not there until Friday.

## 2022-04-27 NOTE — TELEPHONE ENCOUNTER
Spoke with pt informed pt message below he stated he has the sania on the phone but do not know where he misplace the , also he wont be able come until next week Thurs or Friday to get a new one.   No one is there to calibrate it until 10am tomorrow so he will have someone to do it then.

## 2022-04-27 NOTE — TELEPHONE ENCOUNTER
Is there anything we can do about this? Patient I saw yesterday and started back on Dexcom. Can we just give him a new transmitter?

## 2022-05-10 ENCOUNTER — PROCEDURE VISIT (OUTPATIENT)
Dept: OPHTHALMOLOGY | Facility: CLINIC | Age: 77
End: 2022-05-10
Payer: MEDICARE

## 2022-05-10 DIAGNOSIS — Z79.4 TYPE 2 DIABETES MELLITUS WITH BOTH EYES AFFECTED BY PROLIFERATIVE RETINOPATHY AND MACULAR EDEMA, WITH LONG-TERM CURRENT USE OF INSULIN: Primary | ICD-10-CM

## 2022-05-10 DIAGNOSIS — E11.3513 TYPE 2 DIABETES MELLITUS WITH BOTH EYES AFFECTED BY PROLIFERATIVE RETINOPATHY AND MACULAR EDEMA, WITH LONG-TERM CURRENT USE OF INSULIN: Primary | ICD-10-CM

## 2022-05-10 PROCEDURE — 67028 PR INJECT INTRAVITREAL PHARMCOLOGIC: ICD-10-PCS | Mod: S$PBB,RT,, | Performed by: OPHTHALMOLOGY

## 2022-05-10 PROCEDURE — 99499 NO LOS: ICD-10-PCS | Mod: S$PBB,,, | Performed by: OPHTHALMOLOGY

## 2022-05-10 PROCEDURE — 99499 UNLISTED E&M SERVICE: CPT | Mod: S$PBB,,, | Performed by: OPHTHALMOLOGY

## 2022-05-10 PROCEDURE — 67028 INJECTION EYE DRUG: CPT | Mod: S$PBB,RT,, | Performed by: OPHTHALMOLOGY

## 2022-05-10 PROCEDURE — 92134 CPTRZ OPH DX IMG PST SGM RTA: CPT | Mod: PBBFAC | Performed by: OPHTHALMOLOGY

## 2022-05-10 PROCEDURE — 92134 POSTERIOR SEGMENT OCT RETINA (OCULAR COHERENCE TOMOGRAPHY)-BOTH EYES: ICD-10-PCS | Mod: 26,S$PBB,, | Performed by: OPHTHALMOLOGY

## 2022-05-10 PROCEDURE — 67028 INJECTION EYE DRUG: CPT | Mod: PBBFAC,RT | Performed by: OPHTHALMOLOGY

## 2022-05-10 RX ADMIN — AFLIBERCEPT 2 MG: 40 INJECTION, SOLUTION INTRAVITREAL at 12:05

## 2022-05-10 NOTE — PROGRESS NOTES
===============================  Date today is 5/10/2022  Mono Bergman is a 76 y.o. male  Last visit [facility]: :4/7/2022   Last visit eye dept. 4/7/2022    Corrected distance visual acuity was 20/100 in the right eye and 20/200 in the left eye.  Tonometry     Tonometry (Applanation, 11:48 AM)       Right Left    Pressure 15 14          Max Pressure       Right Left    Max 22 31 (7/27/2021)              Not recorded       Not recorded       Not recorded       Chief Complaint   Patient presents with    PDR/ME     EYLEA OD       Problem List Items Addressed This Visit    None     Visit Diagnoses     Type 2 diabetes mellitus with both eyes affected by proliferative retinopathy and macular edema, with long-term current use of insulin    -  Primary    Relevant Medications    aflibercept Soln 2 mg (Completed)    Other Relevant Orders    Posterior Segment OCT Retina-Both eyes (Completed)    Prior authorization Order          ________________  5/10/2022 today    OD DME  OCT a little better  Will try txx next visit if ok      Injection Procedure Note:    5/10/2022  Diagnosis :  OD DME  Today:   Eylea (afibercept) 2 mg/0.05 ml Intravitreal Injection , OD   Follow up: 1 month Eylea OD    Instructed to call 24/7 for any worsening of vision. Check Both eyes daily. Gave patient my home phone number.  Risks, benefits, and alternatives to treatment discussed in detail with the patient.  The patient voiced understanding and wished to proceed with the procedure.     Patient Identified and Time Out complete  Subconjunctival bleb - xylocaine with epi 2%   and Betadine.  Inject at Eylea (afibercept) 2 mg/0.05 ml Intravitreal Injection , OD 6:00 @ 3.5-4mm posterior to limbus  1 stop: no   Post Operative Dx: Same  Complications: None  Follow up as above.      ===============================

## 2022-06-09 ENCOUNTER — PROCEDURE VISIT (OUTPATIENT)
Dept: OPHTHALMOLOGY | Facility: CLINIC | Age: 77
End: 2022-06-09
Payer: MEDICARE

## 2022-06-09 DIAGNOSIS — E11.3513 TYPE 2 DIABETES MELLITUS WITH BOTH EYES AFFECTED BY PROLIFERATIVE RETINOPATHY AND MACULAR EDEMA, WITH LONG-TERM CURRENT USE OF INSULIN: Primary | ICD-10-CM

## 2022-06-09 DIAGNOSIS — Z79.4 TYPE 2 DIABETES MELLITUS WITH BOTH EYES AFFECTED BY PROLIFERATIVE RETINOPATHY AND MACULAR EDEMA, WITH LONG-TERM CURRENT USE OF INSULIN: Primary | ICD-10-CM

## 2022-06-09 PROCEDURE — 67028 PR INJECT INTRAVITREAL PHARMCOLOGIC: ICD-10-PCS | Mod: S$PBB,RT,, | Performed by: OPHTHALMOLOGY

## 2022-06-09 PROCEDURE — 92134 POSTERIOR SEGMENT OCT RETINA (OCULAR COHERENCE TOMOGRAPHY)-BOTH EYES: ICD-10-PCS | Mod: 26,S$PBB,, | Performed by: OPHTHALMOLOGY

## 2022-06-09 PROCEDURE — 67028 INJECTION EYE DRUG: CPT | Mod: PBBFAC,RT | Performed by: OPHTHALMOLOGY

## 2022-06-09 PROCEDURE — 99499 UNLISTED E&M SERVICE: CPT | Mod: S$PBB,,, | Performed by: OPHTHALMOLOGY

## 2022-06-09 PROCEDURE — 99499 NO LOS: ICD-10-PCS | Mod: S$PBB,,, | Performed by: OPHTHALMOLOGY

## 2022-06-09 PROCEDURE — 67028 INJECTION EYE DRUG: CPT | Mod: S$PBB,RT,, | Performed by: OPHTHALMOLOGY

## 2022-06-09 PROCEDURE — 92134 CPTRZ OPH DX IMG PST SGM RTA: CPT | Mod: PBBFAC | Performed by: OPHTHALMOLOGY

## 2022-06-09 RX ADMIN — AFLIBERCEPT 2 MG: 40 INJECTION, SOLUTION INTRAVITREAL at 02:06

## 2022-06-09 NOTE — PROGRESS NOTES
===============================  Date today is 6/9/2022  Mono Bergman is a 76 y.o. male  Last visit [facility]: :5/10/2022   Last visit eye dept. 5/10/2022    Corrected distance visual acuity was 20/100 in the right eye and 20/200 in the left eye.  Not recorded       Not recorded       Not recorded       Not recorded       Chief Complaint   Patient presents with    dme     Eylea od       Problem List Items Addressed This Visit    None     Visit Diagnoses     Type 2 diabetes mellitus with both eyes affected by proliferative retinopathy and macular edema, with long-term current use of insulin    -  Primary    Relevant Medications    aflibercept Soln 2 mg (Completed) (Start on 6/9/2022  2:45 PM)    Other Relevant Orders    Posterior Segment OCT Retina-Both eyes (Completed)    Prior authorization Order          ________________  6/9/2022 today      od  dme    oct looks great   best in many years   begin txx        Injection Procedure Note:    6/9/2022 Today:   Eylea (afibercept) 2 mg/0.05 ml Intravitreal Injection , OD   Follow up: rtc 6-7 weeks     Instructed to call 24/7 for any worsening of vision. Check Both eyes daily. Gave patient my home phone number.  Risks, benefits, and alternatives to treatment discussed in detail with the patient.  The patient voiced understanding and wished to proceed with the procedure.     Patient Identified and Time Out complete  Subconjunctival bleb - xylocaine with epi 2%   and Betadine.  Inject at Eylea (afibercept) 2 mg/0.05 ml Intravitreal Injection , OD 6:00 @ 3.5-4mm posterior to limbus  1 stop: no   Post Operative Dx: Same  Complications: None  Follow up as above.    ===============================

## 2022-06-14 ENCOUNTER — OFFICE VISIT (OUTPATIENT)
Dept: OTOLARYNGOLOGY | Facility: CLINIC | Age: 77
End: 2022-06-14
Payer: MEDICARE

## 2022-06-14 VITALS — DIASTOLIC BLOOD PRESSURE: 74 MMHG | SYSTOLIC BLOOD PRESSURE: 122 MMHG | HEART RATE: 64 BPM | TEMPERATURE: 98 F

## 2022-06-14 DIAGNOSIS — H61.21 IMPACTED CERUMEN OF RIGHT EAR: Primary | ICD-10-CM

## 2022-06-14 PROCEDURE — 69210 REMOVE IMPACTED EAR WAX UNI: CPT | Mod: PBBFAC | Performed by: PHYSICIAN ASSISTANT

## 2022-06-14 PROCEDURE — 99999 PR PBB SHADOW E&M-EST. PATIENT-LVL IV: CPT | Mod: PBBFAC,,, | Performed by: PHYSICIAN ASSISTANT

## 2022-06-14 PROCEDURE — 69210 PR REMOVAL IMPACTED CERUMEN REQUIRING INSTRUMENTATION, UNILATERAL: ICD-10-PCS | Mod: S$PBB,,, | Performed by: PHYSICIAN ASSISTANT

## 2022-06-14 PROCEDURE — 69210 REMOVE IMPACTED EAR WAX UNI: CPT | Mod: S$PBB,,, | Performed by: PHYSICIAN ASSISTANT

## 2022-06-14 PROCEDURE — 99499 UNLISTED E&M SERVICE: CPT | Mod: S$PBB,,, | Performed by: PHYSICIAN ASSISTANT

## 2022-06-14 PROCEDURE — 99999 PR PBB SHADOW E&M-EST. PATIENT-LVL IV: ICD-10-PCS | Mod: PBBFAC,,, | Performed by: PHYSICIAN ASSISTANT

## 2022-06-14 PROCEDURE — 99214 OFFICE O/P EST MOD 30 MIN: CPT | Mod: PBBFAC | Performed by: PHYSICIAN ASSISTANT

## 2022-06-14 PROCEDURE — 99499 NO LOS: ICD-10-PCS | Mod: S$PBB,,, | Performed by: PHYSICIAN ASSISTANT

## 2022-06-14 NOTE — PROGRESS NOTES
Subjective:   Patient ID: Mono Bergman is a 76 y.o. male.    Chief Complaint: Cerumen Impaction    Patient is here to see me today for evaluation of a possible wax impaction in right ear.  He has complaints of hearing loss in the affected ear, but denies pain or drainage.  This has been an issue in the past.  The patient has not been using any sort of ear drop to soften the wax.    Review of patient's allergies indicates:  No Known Allergies        Review of Systems   HENT: Positive for hearing loss.          Objective:   /74   Pulse 64   Temp 97.5 °F (36.4 °C) (Temporal)     Physical Exam  HENT:      Right Ear: There is impacted cerumen.          Procedure Note    CHIEF COMPLAINT:  Cerumen Impaction    Description:  The patient was seated in an exam chair.  An ear speculum was placed in the right EAC and was examined under the microscope.  Suction and/or loop curettes were used to remove a large cerumen impaction.  The tympanic membrane was visualized and was normal in appearance.  The procedure was repeated on the left side in a similar fashion.  The TM was intact and normal on this side as well.  The patient tolerated the procedure well.    Assessment:     1. Impacted cerumen of right ear        Plan:     Impacted cerumen of right ear       Cerumen impaction:  Removed today without difficulty.  I would recommend the use of a wax softening drop, either over the counter Debrox or mineral oil, on a weekly basis.  I also instructed the patient to avoid Qtips.

## 2022-07-05 ENCOUNTER — LAB VISIT (OUTPATIENT)
Dept: LAB | Facility: HOSPITAL | Age: 77
End: 2022-07-05
Attending: FAMILY MEDICINE
Payer: MEDICARE

## 2022-07-05 DIAGNOSIS — E11.65 UNCONTROLLED TYPE 2 DIABETES MELLITUS WITH HYPERGLYCEMIA: ICD-10-CM

## 2022-07-05 DIAGNOSIS — D50.9 IRON DEFICIENCY ANEMIA, UNSPECIFIED IRON DEFICIENCY ANEMIA TYPE: ICD-10-CM

## 2022-07-05 LAB
BASOPHILS # BLD AUTO: 0.07 K/UL (ref 0–0.2)
BASOPHILS NFR BLD: 0.9 % (ref 0–1.9)
DIFFERENTIAL METHOD: ABNORMAL
EOSINOPHIL # BLD AUTO: 0.3 K/UL (ref 0–0.5)
EOSINOPHIL NFR BLD: 4.2 % (ref 0–8)
ERYTHROCYTE [DISTWIDTH] IN BLOOD BY AUTOMATED COUNT: 13 % (ref 11.5–14.5)
ESTIMATED AVG GLUCOSE: 157 MG/DL (ref 68–131)
FERRITIN SERPL-MCNC: 84 NG/ML (ref 20–300)
HBA1C MFR BLD: 7.1 % (ref 4–5.6)
HCT VFR BLD AUTO: 37.7 % (ref 40–54)
HGB BLD-MCNC: 12.3 G/DL (ref 14–18)
IMM GRANULOCYTES # BLD AUTO: 0.02 K/UL (ref 0–0.04)
IMM GRANULOCYTES NFR BLD AUTO: 0.2 % (ref 0–0.5)
IRON SERPL-MCNC: 88 UG/DL (ref 45–160)
LYMPHOCYTES # BLD AUTO: 2.5 K/UL (ref 1–4.8)
LYMPHOCYTES NFR BLD: 30.4 % (ref 18–48)
MCH RBC QN AUTO: 29.4 PG (ref 27–31)
MCHC RBC AUTO-ENTMCNC: 32.6 G/DL (ref 32–36)
MCV RBC AUTO: 90 FL (ref 82–98)
MONOCYTES # BLD AUTO: 0.8 K/UL (ref 0.3–1)
MONOCYTES NFR BLD: 9.7 % (ref 4–15)
NEUTROPHILS # BLD AUTO: 4.4 K/UL (ref 1.8–7.7)
NEUTROPHILS NFR BLD: 54.6 % (ref 38–73)
NRBC BLD-RTO: 0 /100 WBC
PLATELET # BLD AUTO: 238 K/UL (ref 150–450)
PMV BLD AUTO: 10.3 FL (ref 9.2–12.9)
RBC # BLD AUTO: 4.18 M/UL (ref 4.6–6.2)
SATURATED IRON: 33 % (ref 20–50)
TOTAL IRON BINDING CAPACITY: 269 UG/DL (ref 250–450)
TRANSFERRIN SERPL-MCNC: 182 MG/DL (ref 200–375)
WBC # BLD AUTO: 8.06 K/UL (ref 3.9–12.7)

## 2022-07-05 PROCEDURE — 36415 COLL VENOUS BLD VENIPUNCTURE: CPT | Mod: PO | Performed by: FAMILY MEDICINE

## 2022-07-05 PROCEDURE — 85025 COMPLETE CBC W/AUTO DIFF WBC: CPT | Performed by: FAMILY MEDICINE

## 2022-07-05 PROCEDURE — 84466 ASSAY OF TRANSFERRIN: CPT | Performed by: FAMILY MEDICINE

## 2022-07-05 PROCEDURE — 82728 ASSAY OF FERRITIN: CPT | Performed by: FAMILY MEDICINE

## 2022-07-05 PROCEDURE — 83036 HEMOGLOBIN GLYCOSYLATED A1C: CPT | Performed by: FAMILY MEDICINE

## 2022-07-14 ENCOUNTER — TELEPHONE (OUTPATIENT)
Dept: OPHTHALMOLOGY | Facility: CLINIC | Age: 77
End: 2022-07-14
Payer: MEDICARE

## 2022-07-14 NOTE — TELEPHONE ENCOUNTER
Pt sent a message to cancel appointment. Called to reschedule if needed.. no answer, left message stating I'll cancel appointment and he can call back to reschedule.

## 2022-07-21 ENCOUNTER — PROCEDURE VISIT (OUTPATIENT)
Dept: OPHTHALMOLOGY | Facility: CLINIC | Age: 77
End: 2022-07-21
Payer: MEDICARE

## 2022-07-21 DIAGNOSIS — E11.3513 TYPE 2 DIABETES MELLITUS WITH BOTH EYES AFFECTED BY PROLIFERATIVE RETINOPATHY AND MACULAR EDEMA, WITH LONG-TERM CURRENT USE OF INSULIN: Primary | ICD-10-CM

## 2022-07-21 DIAGNOSIS — Z79.4 TYPE 2 DIABETES MELLITUS WITH BOTH EYES AFFECTED BY PROLIFERATIVE RETINOPATHY AND MACULAR EDEMA, WITH LONG-TERM CURRENT USE OF INSULIN: Primary | ICD-10-CM

## 2022-07-21 PROCEDURE — 67028 INJECTION EYE DRUG: CPT | Mod: S$PBB,RT,, | Performed by: OPHTHALMOLOGY

## 2022-07-21 PROCEDURE — 92134 POSTERIOR SEGMENT OCT RETINA (OCULAR COHERENCE TOMOGRAPHY)-BOTH EYES: ICD-10-PCS | Mod: 26,S$PBB,, | Performed by: OPHTHALMOLOGY

## 2022-07-21 PROCEDURE — 99499 UNLISTED E&M SERVICE: CPT | Mod: S$PBB,,, | Performed by: OPHTHALMOLOGY

## 2022-07-21 PROCEDURE — 99499 NO LOS: ICD-10-PCS | Mod: S$PBB,,, | Performed by: OPHTHALMOLOGY

## 2022-07-21 PROCEDURE — 92134 CPTRZ OPH DX IMG PST SGM RTA: CPT | Mod: PBBFAC | Performed by: OPHTHALMOLOGY

## 2022-07-21 PROCEDURE — 67028 INJECTION EYE DRUG: CPT | Mod: PBBFAC,RT | Performed by: OPHTHALMOLOGY

## 2022-07-21 PROCEDURE — 67028 PR INJECT INTRAVITREAL PHARMCOLOGIC: ICD-10-PCS | Mod: S$PBB,RT,, | Performed by: OPHTHALMOLOGY

## 2022-07-21 RX ADMIN — AFLIBERCEPT 2 MG: 40 INJECTION, SOLUTION INTRAVITREAL at 03:07

## 2022-07-28 ENCOUNTER — OFFICE VISIT (OUTPATIENT)
Dept: DIABETES | Facility: CLINIC | Age: 77
End: 2022-07-28
Payer: MEDICARE

## 2022-07-28 VITALS
HEIGHT: 68 IN | HEART RATE: 68 BPM | SYSTOLIC BLOOD PRESSURE: 91 MMHG | BODY MASS INDEX: 35.16 KG/M2 | DIASTOLIC BLOOD PRESSURE: 51 MMHG

## 2022-07-28 DIAGNOSIS — E11.65 UNCONTROLLED TYPE 2 DIABETES MELLITUS WITH HYPERGLYCEMIA, WITH LONG-TERM CURRENT USE OF INSULIN: Primary | ICD-10-CM

## 2022-07-28 DIAGNOSIS — Z79.4 UNCONTROLLED TYPE 2 DIABETES MELLITUS WITH HYPERGLYCEMIA, WITH LONG-TERM CURRENT USE OF INSULIN: Primary | ICD-10-CM

## 2022-07-28 DIAGNOSIS — E11.59 HYPERTENSION ASSOCIATED WITH DIABETES: ICD-10-CM

## 2022-07-28 DIAGNOSIS — E11.3513 PROLIFERATIVE DIABETIC RETINOPATHY OF BOTH EYES WITH MACULAR EDEMA ASSOCIATED WITH TYPE 2 DIABETES MELLITUS: ICD-10-CM

## 2022-07-28 DIAGNOSIS — E11.69 HYPERLIPIDEMIA ASSOCIATED WITH TYPE 2 DIABETES MELLITUS: ICD-10-CM

## 2022-07-28 DIAGNOSIS — E78.5 HYPERLIPIDEMIA ASSOCIATED WITH TYPE 2 DIABETES MELLITUS: ICD-10-CM

## 2022-07-28 DIAGNOSIS — I15.2 HYPERTENSION ASSOCIATED WITH DIABETES: ICD-10-CM

## 2022-07-28 LAB — GLUCOSE SERPL-MCNC: 200 MG/DL (ref 70–110)

## 2022-07-28 PROCEDURE — 99215 OFFICE O/P EST HI 40 MIN: CPT | Mod: PBBFAC | Performed by: NURSE PRACTITIONER

## 2022-07-28 PROCEDURE — 99213 PR OFFICE/OUTPT VISIT, EST, LEVL III, 20-29 MIN: ICD-10-PCS | Mod: S$PBB,,, | Performed by: NURSE PRACTITIONER

## 2022-07-28 PROCEDURE — 99999 PR PBB SHADOW E&M-EST. PATIENT-LVL V: CPT | Mod: PBBFAC,,, | Performed by: NURSE PRACTITIONER

## 2022-07-28 PROCEDURE — 82962 GLUCOSE BLOOD TEST: CPT | Mod: PBBFAC | Performed by: NURSE PRACTITIONER

## 2022-07-28 PROCEDURE — 99999 PR PBB SHADOW E&M-EST. PATIENT-LVL V: ICD-10-PCS | Mod: PBBFAC,,, | Performed by: NURSE PRACTITIONER

## 2022-07-28 PROCEDURE — 99213 OFFICE O/P EST LOW 20 MIN: CPT | Mod: S$PBB,,, | Performed by: NURSE PRACTITIONER

## 2022-07-28 NOTE — PATIENT INSTRUCTIONS
Medications adjusted for today's visit include:    Referral CeQur.    Continue Lantus 50 units daily.    Continue Novolog 12 units three times a day.     --Carry glucose tablets/soft peppermints/regular juice or Coke product with you at all times to treat a low blood sugar episode (less than 70). If your blood sugar is between 50-70, Chew 4 tablets or drink 1/2 cup of juice or regular Coke product. If your blood sugar is below 50, double the treatment. Re-check blood sugar in 15 minutes. If still low, repeat this. Always call the clinic to give an update for any low blood sugar episodes.    --Follow-up for your next visit in 3 months    --Please either drop off, fax, or MyChart your readings to me as needed.

## 2022-07-28 NOTE — PROGRESS NOTES
Subjective:         Patient ID: Mono Bergman is a 76 y.o. male.  Patient's current PCP is Hermelindo Ash MD.     Chief Complaint: Diabetes Mellitus    HPI  Mono Bergman is a 76 y.o. White male presenting for a follow up for diabetes. Patient has been diagnosed with type 2 diabetes for several years. Diagnosed about 30 years ago -working for the  at the time-syncopal episode and reports glucose was > 500 at that time.    CURRENT DM MEDICATIONS:   Diabetes Medications             insulin aspart U-100 (NOVOLOG) 100 unit/mL (3 mL) InPn pen Inject 12 Units into the skin 3 (three) times daily with meals.    insulin glargine (LANTUS) 100 unit/mL injection Inject 50 Units into the skin every evening.        Past failed treatment include: Glyburide, Metformin, and 70/30    Blood glucose testing is performed regularly. Patient is testing 4 times per day.  Meter: Needs Dexcom G6 re-ordered ([place])  Preferred lab: [facility] preferred    Any episodes of hypoglycemia? Seldom,mild    Complications related to diabetes: retinopathy and peripheral neuropathy    His blood sugar in the clinic today was:   Lab Results   Component Value Date    POCGLU 200 (A) 07/28/2022     Mono Bergman presents today for follow up visit to discuss diabetes management. Taking 12 units of Novolog if blood sugar is > 200. If blood sugar is <200, he will not take Novolog. Lives in an assisted living facility and the nursing staff/sitter administers all of his injections. He is interested in insulin pump therapy, however I feel this would be too much for him. He may do better with CeQur- he wants to try this.  Now on Dexcom, however unfortunately does not have Clarity downloaded. Cannot remember his Apple password, but he does have this at home. He will work on this today. Reports BGs are better, but still a little on the high side.    [facility]     - Followed by   [facility].    Current diet: Diabetic  Activity Level: Sednetary    Lab Results   Component Value Date    HGBA1C 7.1 (H) 07/05/2022    HGBA1C 7.8 (H) 04/12/2022    HGBA1C 6.5 (H) 11/12/2020     STANDARDS OF CARE  Diabetes Management Status    Statin: Taking  ACE/ARB: Not taking    Screening or Prevention Patient's value Goal Complete/Controlled?   HgA1C Testing and Control   Lab Results   Component Value Date    HGBA1C 7.1 (H) 07/05/2022      Annually/Less than 8% Yes   Lipid profile : 04/12/2022 Annually Yes   LDL control Lab Results   Component Value Date    LDLCALC 63.6 04/12/2022    Annually/Less than 100 mg/dl  Yes   Nephropathy screening Lab Results   Component Value Date    LABMICR 1001.0 07/07/2022     No results found for: PROTEINUA  No results found for: UTPCR   Annually Yes   Blood pressure BP Readings from Last 1 Encounters:   07/28/22 (!) 91/51    Less than 140/90 Yes   Dilated retinal exam : 02/17/2022 Annually Yes   Foot exam   : 04/26/2022 Annually Yes     Labs reviewed and are noted below.    Lab Results   Component Value Date    WBC 8.06 07/05/2022    HGB 12.3 (L) 07/05/2022    HCT 37.7 (L) 07/05/2022     07/05/2022    CHOL 118 (L) 04/12/2022    TRIG 132 04/12/2022    HDL 28 (L) 04/12/2022    LDLCALC 63.6 04/12/2022    ALT 18 04/12/2022    AST 21 04/12/2022     04/12/2022    K 4.3 04/12/2022     04/12/2022    ANIONGAP 9 04/12/2022    CREATININE 1.9 (H) 04/12/2022    ESTGFRAFRICA 39.3 (A) 04/12/2022    EGFRNONAA 34.0 (A) 04/12/2022    BUN 18 04/12/2022    CO2 29 04/12/2022    TSH 1.746 04/12/2022    PSA 3.9 04/12/2022    INR 1.0 05/01/2017    GLU 60 (L) 04/12/2022     Lab Results   Component Value Date    TSH 1.746 04/12/2022    IRON 88 07/05/2022    TIBC 269 07/05/2022    FERRITIN 84 07/05/2022    CALCIUM 9.2 04/12/2022     No results found for: CPEPTIDE  No results found for: GLUTAMICACID  Glucose   Date Value Ref Range Status   04/12/2022 60 (L) 70 - 110 mg/dL Final     Anion Gap    Date Value Ref Range Status   04/12/2022 9 8 - 16 mmol/L Final     eGFR if    Date Value Ref Range Status   04/12/2022 39.3 (A) >60 mL/min/1.73 m^2 Final     eGFR if non    Date Value Ref Range Status   04/12/2022 34.0 (A) >60 mL/min/1.73 m^2 Final     Comment:     Calculation used to obtain the estimated glomerular filtration  rate (eGFR) is the CKD-EPI equation.          The following portions of the patient's history were reviewed and updated as appropriate: allergies, current medications, past family history, past medical history, past social history, past surgical history and problem list.    Review of patient's allergies indicates:  No Known Allergies  Social History     Socioeconomic History    Marital status: Single    Number of children: 0   Occupational History    Occupation: retired     Employer: retired   Tobacco Use    Smoking status: Never Smoker    Smokeless tobacco: Never Used   Substance and Sexual Activity    Alcohol use: No    Drug use: No    Sexual activity: Not Currently     Past Medical History:   Diagnosis Date    Arthritis     Cataract     Colon polyps 10/29/13    Diabetes mellitus type II     Diabetic retinopathy     GERD (gastroesophageal reflux disease) 7/18/2013    Hyperlipidemia LDL goal < 70 7/18/2013    Hypertension     Low BP    JOEL on CPAP     Uveitis 5/11/2016     REVIEW OF SYSTEMS:  Eyes History of DR.  Cardiovascular: History of HTN and hyperlipidemia.  GI:History of GERD.  : No CKD.  Neuro: Positive neuropathy.  PSYCH: No tobacco use.  ENDO: See HPI.        Objective:      Vitals:    07/28/22 1401   BP: (!) 91/51   Pulse: 68     RESPIRATORY: No respiratory distress  NEUROLOGIC: Cranial nerves II-XII grossly intact.   PSYCHIATRIC: Alert & oriented x3. Normal mood and affect.  FOOT EXAMINATION: UTD    Assessment:       1. Uncontrolled type 2 diabetes mellitus with hyperglycemia, with long-term current use of insulin    2.  "Proliferative diabetic retinopathy of both eyes with macular edema associated with type 2 diabetes mellitus    3. Hypertension associated with diabetes    4. Hyperlipidemia associated with type 2 diabetes mellitus        Plan:   Mono was seen today for diabetes mellitus.    Diagnoses and all orders for this visit:    Uncontrolled type 2 diabetes mellitus with hyperglycemia, with long-term current use of insulin  -     POCT Glucose, Hand-Held Device  -     Ambulatory referral/consult to Diabetes Education; Future    Chronic-     Medications adjusted for today's visit include:    Referral CeQur.    Continue Lantus 50 units daily.    Continue Novolog 12 units three times a day.     Proliferative diabetic retinopathy of both eyes with macular edema associated with type 2 diabetes mellitus    Chronic,stable. Follow up with ophthalmology as advised.    Hypertension associated with diabetes    Chronic,stable. BP historically well controlled but on the low side of normal currently. Continue current medications and continue to monitor.    Hyperlipidemia associated with type 2 diabetes mellitus    Chronic,stable. Continue Lipitor.    - Follow up: 3 months    Portions of this note may have been created with voice recognition software. Occasional "wrong-word" or "sound-a-like" substitutions may have occurred due to the inherent limitations of voice recognition software. Please, read the note carefully and recognize, using context, where substitutions have occurred.             Sheri Ammons,FNP-C [facility]      Sheri Ammons,FNP-C [facility]      "

## 2022-07-29 ENCOUNTER — TELEPHONE (OUTPATIENT)
Dept: DIABETES | Facility: CLINIC | Age: 77
End: 2022-07-29
Payer: MEDICARE

## 2022-08-09 ENCOUNTER — TELEPHONE (OUTPATIENT)
Dept: DIABETES | Facility: CLINIC | Age: 77
End: 2022-08-09
Payer: MEDICARE

## 2022-08-09 NOTE — TELEPHONE ENCOUNTER
----- Message from Oumou Machado sent at 8/9/2022 10:21 AM CDT -----  Contact: Tamra/Care giver  Tamra would like a call back at  in regards to the patient dexcom. She states that they have download the sania and they need assistance to set it up.      Thanks

## 2022-08-23 ENCOUNTER — OFFICE VISIT (OUTPATIENT)
Dept: OTOLARYNGOLOGY | Facility: CLINIC | Age: 77
End: 2022-08-23
Payer: OTHER GOVERNMENT

## 2022-08-23 ENCOUNTER — TELEPHONE (OUTPATIENT)
Dept: INTERNAL MEDICINE | Facility: CLINIC | Age: 77
End: 2022-08-23
Payer: MEDICARE

## 2022-08-23 VITALS — BODY MASS INDEX: 36.91 KG/M2 | WEIGHT: 242.75 LBS | TEMPERATURE: 98 F

## 2022-08-23 DIAGNOSIS — H61.21 IMPACTED CERUMEN OF RIGHT EAR: Primary | ICD-10-CM

## 2022-08-23 PROCEDURE — 99213 OFFICE O/P EST LOW 20 MIN: CPT | Mod: S$PBB,,, | Performed by: STUDENT IN AN ORGANIZED HEALTH CARE EDUCATION/TRAINING PROGRAM

## 2022-08-23 PROCEDURE — 99212 OFFICE O/P EST SF 10 MIN: CPT | Mod: PBBFAC,PO | Performed by: STUDENT IN AN ORGANIZED HEALTH CARE EDUCATION/TRAINING PROGRAM

## 2022-08-23 PROCEDURE — 99999 PR PBB SHADOW E&M-EST. PATIENT-LVL II: ICD-10-PCS | Mod: PBBFAC,,, | Performed by: STUDENT IN AN ORGANIZED HEALTH CARE EDUCATION/TRAINING PROGRAM

## 2022-08-23 PROCEDURE — 99213 PR OFFICE/OUTPT VISIT, EST, LEVL III, 20-29 MIN: ICD-10-PCS | Mod: S$PBB,,, | Performed by: STUDENT IN AN ORGANIZED HEALTH CARE EDUCATION/TRAINING PROGRAM

## 2022-08-23 PROCEDURE — 99999 PR PBB SHADOW E&M-EST. PATIENT-LVL II: CPT | Mod: PBBFAC,,, | Performed by: STUDENT IN AN ORGANIZED HEALTH CARE EDUCATION/TRAINING PROGRAM

## 2022-08-23 NOTE — TELEPHONE ENCOUNTER
----- Message from Keila Canchola sent at 8/23/2022 12:24 PM CDT -----  Regarding: Prescription  Pt is seeing the specialty doctor right now. He said Dr Rodríguez had prescribed a medication for him awhile back but he never got it filled. He was to see Astrid but he didn't have a ride so he missed his last appt with Astrid. He wanted to see what the medication is for that Dr Rodríguez had last prescribed and wanted to see while he's here could he a get written prescription for that medication.

## 2022-08-23 NOTE — PROGRESS NOTES
REFERRING PROVIDER  [facility]  [address] [place] [address]  Subjective:   Patient: Mono Bergman [id number], :1945   Visit date:2022 12:29 PM    Chief Complaint:  Cerumen Impaction        HPI:     Mono Bergman is a 77 y.o. male whom I am asked to see for evaluation of progressively worsening hearing loss in both ears for the past few weeks.   Mono rates the severity as moderate.  No exacerbating or relieving factors.  There is no drainage from the ears.      His meds, allergies, medical, surgical, social & family histories were reviewed & updated:  -     He has a current medication list which includes the following prescription(s): acetazolamide, amlodipine, antiox #8/om3/dha/epa/lut/zeax, arginine, aspirin, atorvastatin, bisacodyl, dexcom g6 , dexcom g6 sensor, dexcom g6 transmitter, calcium carbonate, cyanocobalamin, docusate sodium, dorzolamide-timolol 2-0.5%, ibuprofen, insulin aspart u-100, insulin glargine, pen needle, diabetic, l.acidophilus-b.bifidum,longum, metoclopramide hcl, midodrine, ondansetron, pantoprazole, pen needle, diabetic, polyethylene glycol, propranolol hcl, and simethicone.  -     He  has a past medical history of Arthritis, Cataract, Colon polyps (10/29/13), Diabetes mellitus type II, Diabetic retinopathy, GERD (gastroesophageal reflux disease) (2013), Hyperlipidemia LDL goal < 70 (2013), Hypertension, JOEL on CPAP, and Uveitis (2016).   -     He does not have any pertinent problems on file.   -     He  has a past surgical history that includes Knee surgery (); Eye surgery; rk rt. eye; Cataract extraction w/  intraocular lens implant (Left, 12/10/14); Cataract extraction w/  intraocular lens implant (Right, 2016); Cholecystectomy; and Colonoscopy (N/A, 2/3/2017).  -     He  reports that he has never smoked. He has never used smokeless tobacco. He reports that he does not drink alcohol  and does not use drugs.  -     His family history includes Heart disease in his mother.  -     He has No Known Allergies.      Review of Systems:  -     Allergic/Immunologic: has No Known Allergies..  -     Constitutional: Current temp: 97.9 °F (36.6 °C) (Temporal)        Objective:     Physical Exam:  Vitals:  Temp 97.9 °F (36.6 °C) (Temporal)   Wt 110.1 kg (242 lb 11.6 oz)   BMI 36.91 kg/m²   Communication:  Able to communicate, no hoarseness.  Head & Face:  Normocephalic, atraumatic, no sinus tenderness.  Eyes:  Extraocular motions intact.  Ears:  Otoscopy of external auditory canals reveals impaction of bilateral ear canals.  With the patient in the supine position, we used the operating microscope to examine both ears with the appropriate sized ear speculum.  A variety of sterile, micro-instruments were utilized to remove the cerumen atraumatically from the impacted ear(s).   After removal, the ears were reexamined-  Right Ear:  No mass/lesion of auricle. The external auditory canals is without erythema or discharge. Pneumatic otoscopy of the tympanic membrane revealed no perforation, with no fluid in middle ear. Clinical speech reception thresholds grossly normal.  Left Ear:  No mass/lesion of auricle. The external auditory canals is without erythema or discharge. Pneumatic otoscopy of the tympanic membrane revealed no perforation, with no fluid in middle ear. Clinical speech reception thresholds grossly normal  Nose:  No masses/lesions of external nose, nasal mucosa, septum, and turbinates were within normal limits.  Mouth:  No mass/lesion of lips, teeth, gums, hard/soft palate, tongue, tonsils, or oropharynx.  Neck & Lymphatics:  No cervical lymphadenopathy, no neck mass/crepitus/ asymmetry, trachea is midline, no thyroid enlargement/tenderness/mass.  Neuro/Psych: Alert with normal mood and affect.   Respiration/Chest:  Symmetric expansion during respiration, normal respiratory effort.  Skin:  Warm and  intact.    Assessment & Plan:       -     Cerumen Impaction - Mono has cerumen impaction.  We discussed preventative measures and treatment options.  Q-tips must be avoided, instead the ears can be cleaned with OTC ear rinses (or a mixture of alcohol & vinegar in equal parts).   For hard wax, Mono may place mineral oil/baby oil in the ear with a cotton ball at night and remove in the shower.  This will assist in softening the wax and allow it to drain out on its own. If the cerumen impacts the ear canal and causes hearing loss or infection he needs to follow-up in the clinic for treatment and cleaning.    Hearing improved after removal on the right

## 2022-09-08 ENCOUNTER — PROCEDURE VISIT (OUTPATIENT)
Dept: OPHTHALMOLOGY | Facility: CLINIC | Age: 77
End: 2022-09-08
Payer: MEDICARE

## 2022-09-08 DIAGNOSIS — E11.3513 TYPE 2 DIABETES MELLITUS WITH BOTH EYES AFFECTED BY PROLIFERATIVE RETINOPATHY AND MACULAR EDEMA, WITH LONG-TERM CURRENT USE OF INSULIN: Primary | ICD-10-CM

## 2022-09-08 DIAGNOSIS — Z79.4 TYPE 2 DIABETES MELLITUS WITH BOTH EYES AFFECTED BY PROLIFERATIVE RETINOPATHY AND MACULAR EDEMA, WITH LONG-TERM CURRENT USE OF INSULIN: Primary | ICD-10-CM

## 2022-09-08 PROCEDURE — 67028 INJECTION EYE DRUG: CPT | Mod: S$PBB,RT,, | Performed by: OPHTHALMOLOGY

## 2022-09-08 PROCEDURE — 92134 CPTRZ OPH DX IMG PST SGM RTA: CPT | Mod: PBBFAC | Performed by: OPHTHALMOLOGY

## 2022-09-08 PROCEDURE — 67028 INJECTION EYE DRUG: CPT | Mod: PBBFAC,RT | Performed by: OPHTHALMOLOGY

## 2022-09-08 PROCEDURE — 92134 POSTERIOR SEGMENT OCT RETINA (OCULAR COHERENCE TOMOGRAPHY)-BOTH EYES: ICD-10-PCS | Mod: 26,S$PBB,, | Performed by: OPHTHALMOLOGY

## 2022-09-08 PROCEDURE — 99499 NO LOS: ICD-10-PCS | Mod: S$PBB,,, | Performed by: OPHTHALMOLOGY

## 2022-09-08 PROCEDURE — 67028 PR INJECT INTRAVITREAL PHARMCOLOGIC: ICD-10-PCS | Mod: S$PBB,RT,, | Performed by: OPHTHALMOLOGY

## 2022-09-08 PROCEDURE — 99499 UNLISTED E&M SERVICE: CPT | Mod: S$PBB,,, | Performed by: OPHTHALMOLOGY

## 2022-09-08 NOTE — PROGRESS NOTES
===============================  Date today is 9/9/2022  Mono Bergman is a 77 y.o. male  Last visit [facility]: :7/21/2022   Last visit eye dept. 7/21/2022    Corrected distance visual acuity was 20/200 in the right eye and 20/200 in the left eye.  Tonometry       Tonometry (Applanation, 1:16 PM)         Right Left    Pressure 10 12              Max Pressure         Right Left    Max 22 31 (7/27/2021)                  Not recorded       Not recorded       Not recorded       No chief complaint on file.      Problem List Items Addressed This Visit    None  Visit Diagnoses       Type 2 diabetes mellitus with both eyes affected by proliferative retinopathy and macular edema, with long-term current use of insulin    -  Primary    Relevant Medications    aflibercept Soln 2 mg (Completed)    Other Relevant Orders    Posterior Segment OCT Retina-Both eyes (Completed)    Prior authorization Order          Instructed to call 24/7 for any worsening of vision, visual distortion or pain.  Check OU independently daily.    Gave my office and personal cell phone number.  ________________  9/9/2022 today  Mono Bergman    OD DME  ..od txx  today at 7 weeks ....worse  Ozurdex t 40      Injection Procedure Note:    9/8/2022  Diagnosis :  od dme - worse  Today:   Eylea (afibercept) 2 mg/0.05 ml Intravitreal Injection , OD   Follow up: rtc  4 weeks failed txx      Instructed to call 24/7 for any worsening of vision. Check Both eyes daily. Gave patient my home phone number.  Risks, benefits, and alternatives to treatment discussed in detail with the patient.  The patient voiced understanding and wished to proceed with the procedure.     Patient Identified and Time Out complete  Subconjunctival bleb - xylocaine with epi 2%   and Betadine.  Inject at Eylea (afibercept) 2 mg/0.05 ml Intravitreal Injection , OD 6:00 @ 3.5-4mm posterior to limbus  1 stop: no   Post Operative Dx: Same  Complications: None  Follow up as  above.    =============================

## 2022-09-09 RX ADMIN — AFLIBERCEPT 2 MG: 40 INJECTION, SOLUTION INTRAVITREAL at 12:09

## 2022-10-11 ENCOUNTER — PROCEDURE VISIT (OUTPATIENT)
Dept: OPHTHALMOLOGY | Facility: CLINIC | Age: 77
End: 2022-10-11
Payer: MEDICARE

## 2022-10-11 DIAGNOSIS — Z79.4 TYPE 2 DIABETES MELLITUS WITH BOTH EYES AFFECTED BY PROLIFERATIVE RETINOPATHY AND MACULAR EDEMA, WITH LONG-TERM CURRENT USE OF INSULIN: Primary | ICD-10-CM

## 2022-10-11 DIAGNOSIS — E11.3513 TYPE 2 DIABETES MELLITUS WITH BOTH EYES AFFECTED BY PROLIFERATIVE RETINOPATHY AND MACULAR EDEMA, WITH LONG-TERM CURRENT USE OF INSULIN: Primary | ICD-10-CM

## 2022-10-11 PROCEDURE — 67028 INJECTION EYE DRUG: CPT | Mod: PBBFAC,RT | Performed by: OPHTHALMOLOGY

## 2022-10-11 PROCEDURE — 92134 CPTRZ OPH DX IMG PST SGM RTA: CPT | Mod: PBBFAC | Performed by: OPHTHALMOLOGY

## 2022-10-11 PROCEDURE — 99499 NO LOS: ICD-10-PCS | Mod: S$PBB,,, | Performed by: OPHTHALMOLOGY

## 2022-10-11 PROCEDURE — 99499 UNLISTED E&M SERVICE: CPT | Mod: S$PBB,,, | Performed by: OPHTHALMOLOGY

## 2022-10-11 PROCEDURE — 67028 PR INJECT INTRAVITREAL PHARMCOLOGIC: ICD-10-PCS | Mod: S$PBB,RT,, | Performed by: OPHTHALMOLOGY

## 2022-10-11 PROCEDURE — 67028 INJECTION EYE DRUG: CPT | Mod: S$PBB,RT,, | Performed by: OPHTHALMOLOGY

## 2022-10-11 PROCEDURE — 92134 POSTERIOR SEGMENT OCT RETINA (OCULAR COHERENCE TOMOGRAPHY)-BOTH EYES: ICD-10-PCS | Mod: 26,S$PBB,, | Performed by: OPHTHALMOLOGY

## 2022-10-11 RX ADMIN — AFLIBERCEPT 2 MG: 40 INJECTION, SOLUTION INTRAVITREAL at 01:10

## 2022-10-11 NOTE — PROGRESS NOTES
===============================  Date today is 10/11/2022  Mono Bergman is a 77 y.o. male  Last visit [facility]: :9/8/2022   Last visit eye dept. 9/8/2022    Corrected distance visual acuity was 20/200 in the right eye and 20/200 in the left eye.  Tonometry       Tonometry (Applanation, 11:14 AM)         Right Left    Pressure 9 20              Max Pressure         Right Left    Max 22 31 (7/27/2021)                  Not recorded       Not recorded       Not recorded       Chief Complaint   Patient presents with    PDR/ME     EYLEA OD     HPI     PDR/ME     Additional comments: EYLEA OD           Comments    1.) + DM SINCE 1997   2.) + PDR OU/ CSME OS   Bilateral PRP   PPV OS with Dr. 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 TEARS QID OS PRN  REFRESH GEL BID OS   OMEGA E BID P.O.     Eylea OD last 4/7/22, 5/10/22  6/2/22 7/21/22  9/8/22  Eylea OS last 2/10/22  Ozurdex OS 3/26/2021 (steroid responder - IOP in 40s)          Last edited by Laya Carvalho on 10/11/2022 11:08 AM.      Problem List Items Addressed This Visit    None  Visit Diagnoses       Type 2 diabetes mellitus with both eyes affected by proliferative retinopathy and macular edema, with long-term current use of insulin    -  Primary    Relevant Medications    aflibercept Soln 2 mg (Completed)    Other Relevant Orders    Posterior Segment OCT Retina-Both eyes (Completed)    Prior authorization Order          Instructed to call 24/7 for any worsening of vision, visual distortion or pain.  Check OU independently daily.    Gave my office and personal cell phone  number.  ________________  10/11/2022 today  Mono Hernandez Madalyn  :  OD DME  Much better    rec eylea od today    ..    Injection Procedure Note:    10/11/2022  Diagnosis :  od dme - much better  Today:   Eylea (afibercept) 2 mg/0.05 ml Intravitreal Injection , OD   Follow up: rtc 1 mo    Instructed to call 24/7 for any worsening of vision. Check Both eyes daily. Gave patient my home phone number.  Risks, benefits, and alternatives to treatment discussed in detail with the patient.  The patient voiced understanding and wished to proceed with the procedure.     Patient Identified and Time Out complete  Subconjunctival bleb - xylocaine with epi 2%   and Betadine.  Inject at Eylea (afibercept) 2 mg/0.05 ml Intravitreal Injection , OD 6:00 @ 3.5-4mm posterior to limbus  1 stop: no   Post Operative Dx: Same  Complications: None  Follow up as above.    =============================

## 2023-01-25 ENCOUNTER — PATIENT MESSAGE (OUTPATIENT)
Dept: ADMINISTRATIVE | Facility: HOSPITAL | Age: 78
End: 2023-01-25
Payer: MEDICARE

## 2023-01-25 DIAGNOSIS — E11.9 TYPE 2 DIABETES MELLITUS WITHOUT COMPLICATION: ICD-10-CM

## 2023-03-15 ENCOUNTER — TELEPHONE (OUTPATIENT)
Dept: INTERNAL MEDICINE | Facility: CLINIC | Age: 78
End: 2023-03-15
Payer: OTHER GOVERNMENT

## 2023-03-15 NOTE — TELEPHONE ENCOUNTER
Spoke with pt, he said, he only has transportation on Mondays between 10 AM - 1 PM. appt scheduled for 5/22 at 10 AM

## 2023-03-15 NOTE — TELEPHONE ENCOUNTER
----- Message from Vanessa Trevizo sent at 3/15/2023  3:31 PM CDT -----  Regarding: APPT  Contact: Patient  Type: Patient Call Back         Who called: Patient         What is the request in detail: calling to sched an annual visit; states that the needs it to be a Monday btwn 10-1pm due to transportation at living facility; please advise         Best call back number: [phone]         Additional Information: Last annual was  4/12/22; wants a letter mailed once sched           Thank You

## 2023-03-16 ENCOUNTER — TELEPHONE (OUTPATIENT)
Dept: DIABETES | Facility: CLINIC | Age: 78
End: 2023-03-16
Payer: OTHER GOVERNMENT

## 2023-03-16 NOTE — TELEPHONE ENCOUNTER
----- Message from Vanessa Trevizo sent at 3/15/2023  3:39 PM CDT -----  Contact: Patient  Type: Patient Call Back         Who called: Patient         What is the request in detail: calling to sched a check up for a Tuesday btwn 10-1pm due to facility's transportation; please advise        Best call back number: [phone]         Additional Information:  wants a letter sent once sched           Thank You      Thank You

## 2023-04-10 ENCOUNTER — TELEPHONE (OUTPATIENT)
Dept: DIABETES | Facility: CLINIC | Age: 78
End: 2023-04-10
Payer: OTHER GOVERNMENT

## 2023-04-10 ENCOUNTER — PATIENT MESSAGE (OUTPATIENT)
Dept: DIABETES | Facility: CLINIC | Age: 78
End: 2023-04-10
Payer: OTHER GOVERNMENT

## 2023-04-10 DIAGNOSIS — E11.9 TYPE 2 DIABETES MELLITUS WITH HEMOGLOBIN A1C GOAL OF LESS THAN 7.0%: ICD-10-CM

## 2023-04-10 RX ORDER — BLOOD-GLUCOSE,RECEIVER,CONT
EACH MISCELLANEOUS
Qty: 1 EACH | Refills: 0 | Status: CANCELLED | OUTPATIENT
Start: 2023-04-10

## 2023-04-10 NOTE — TELEPHONE ENCOUNTER
No new care gaps identified.  [facility] Embedded Care Gaps. Reference number: [id number]. 4/10/2023   11:26:59 AM TUANT

## 2023-04-10 NOTE — TELEPHONE ENCOUNTER
----- Message from Astrid Ahn sent at 4/10/2023 10:22 AM CDT -----  Contact: Terri/ [facility] is calling to speak to the nurse regarding the patient need a new prescription for his dexcom. Please follow up and give a call back     Thanks  LJ

## 2023-04-10 NOTE — TELEPHONE ENCOUNTER
I was advise a while back that dexcom are sent to a specialty or dme pharmacy please advise im unsure

## 2023-04-10 NOTE — TELEPHONE ENCOUNTER
Received a request from [facility] for chart notes and form to be filled out for Dexcom supplies. Called and left patient a message and sent a [place] message, pt need appointment because we have not seen him since July 2022.

## 2023-04-12 ENCOUNTER — TELEPHONE (OUTPATIENT)
Dept: INTERNAL MEDICINE | Facility: CLINIC | Age: 78
End: 2023-04-12
Payer: OTHER GOVERNMENT

## 2023-04-12 NOTE — TELEPHONE ENCOUNTER
----- Message from Kip Hernandez sent at 4/12/2023 12:23 PM CDT -----  Contact: Winnie/Nurse  Winnie is needing a call back in regards to the pt. She stated that the pt neds a new prescription for his DEXCON faxed over to [facility]. Please call her back at [phone], fax [phone]

## 2023-04-13 ENCOUNTER — TELEPHONE (OUTPATIENT)
Dept: INTERNAL MEDICINE | Facility: CLINIC | Age: 78
End: 2023-04-13
Payer: OTHER GOVERNMENT

## 2023-04-13 DIAGNOSIS — E11.9 TYPE 2 DIABETES MELLITUS WITH HEMOGLOBIN A1C GOAL OF LESS THAN 7.0%: ICD-10-CM

## 2023-04-13 RX ORDER — BLOOD-GLUCOSE,RECEIVER,CONT
EACH MISCELLANEOUS
Qty: 1 EACH | Refills: 0 | OUTPATIENT
Start: 2023-04-13

## 2023-04-13 RX ORDER — BLOOD-GLUCOSE TRANSMITTER
EACH MISCELLANEOUS
Qty: 1 EACH | Refills: 1 | Status: SHIPPED | OUTPATIENT
Start: 2023-04-13 | End: 2023-05-03 | Stop reason: ALTCHOICE

## 2023-04-13 RX ORDER — BLOOD-GLUCOSE SENSOR
EACH MISCELLANEOUS
Qty: 1 EACH | Refills: 1 | OUTPATIENT
Start: 2023-04-13 | End: 2023-05-03 | Stop reason: ALTCHOICE

## 2023-04-13 NOTE — TELEPHONE ENCOUNTER
Spoke with Argelia, nurse at [facility], she said, pt is put of his Dexcom supplies. He's upset because he has to have his finger pricked to check his blood sugar. She requested to have prescriptions sent to [facility] at [phone]. LV 4/12/22, f/u 5/22/23

## 2023-04-13 NOTE — TELEPHONE ENCOUNTER
----- Message from Flor Cameron sent at 4/11/2023 10:14 AM CDT -----  Contact: [phone]  Argelia at [facility] she stated the patient is needing orders for a prescription and also orders. Please call to advise at [phone]. Thanks

## 2023-04-13 NOTE — TELEPHONE ENCOUNTER
----- Message from Jesenia Zheng sent at 4/13/2023  8:05 AM CDT -----  Contact: Winnie/nurse Zhou/nurse would like a call back at [phone], in regards to patient dexcom prescription needs to be faxed to [phone]. Nurse states she has been calling for 4 days and hasn't gotten a response from the office.  Thanks   Am

## 2023-04-13 NOTE — TELEPHONE ENCOUNTER
No new care gaps identified.  [facility] Embedded Care Gaps. Reference number: [id number]. 4/13/2023   9:25:38 AM TUANT

## 2023-04-17 DIAGNOSIS — E11.9 TYPE 2 DIABETES MELLITUS WITH HEMOGLOBIN A1C GOAL OF LESS THAN 7.0%: ICD-10-CM

## 2023-04-17 RX ORDER — BLOOD-GLUCOSE,RECEIVER,CONT
EACH MISCELLANEOUS
Qty: 1 EACH | Refills: 0 | OUTPATIENT
Start: 2023-04-17

## 2023-04-17 RX ORDER — BLOOD-GLUCOSE SENSOR
EACH MISCELLANEOUS
Qty: 1 EACH | Refills: 1 | OUTPATIENT
Start: 2023-04-17

## 2023-04-17 RX ORDER — BLOOD-GLUCOSE TRANSMITTER
EACH MISCELLANEOUS
Qty: 1 EACH | Refills: 1 | OUTPATIENT
Start: 2023-04-17

## 2023-04-17 NOTE — TELEPHONE ENCOUNTER
No new care gaps identified.  [facility] Embedded Care Gaps. Reference number: [id number]. 4/17/2023   2:14:26 PM CDT

## 2023-04-17 NOTE — TELEPHONE ENCOUNTER
The Dexcom sensor and  scripts were printed. They were not sent electronically to [facility]. I do not have the printed script. Can you all of the scripts again and I will fax the scripts to the fax number that was left in the message from last week?

## 2023-04-17 NOTE — TELEPHONE ENCOUNTER
----- Message from LORNA Rangel sent at 4/17/2023 12:26 PM CDT -----  Contact: Eladio  I see where the prescriptions were printed last week  Do we have them to send to [facility] for this patient?  LORNA Hensley    ----- Message -----  From: Ashley Mercer MA  Sent: 4/17/2023  11:26 AM CDT  To: Hermelindo Ash MD      ----- Message -----  From: Kip Hernandez  Sent: 4/17/2023  10:56 AM CDT  To: Jaida Andrade Staff    Eladio is needing a call back in regards to the pt. She stated that she is needing a new prescription sent to [facility] for the patient to receive his DEXCON. Please give her a call back at [phone]

## 2023-04-18 NOTE — TELEPHONE ENCOUNTER
Notified Argelia that dexcom refills were denied. Pt will need an appt with diabetes management and Dr. Ash before getting a refill. She verbalized understanding and stated, she will let pt know.

## 2023-04-26 DIAGNOSIS — E11.9 TYPE 2 DIABETES MELLITUS WITH HEMOGLOBIN A1C GOAL OF LESS THAN 7.0%: ICD-10-CM

## 2023-04-26 DIAGNOSIS — E11.9 TYPE 2 DIABETES MELLITUS WITHOUT COMPLICATION: ICD-10-CM

## 2023-05-03 ENCOUNTER — OFFICE VISIT (OUTPATIENT)
Dept: DIABETES | Facility: CLINIC | Age: 78
End: 2023-05-03
Payer: MEDICARE

## 2023-05-03 ENCOUNTER — TELEPHONE (OUTPATIENT)
Dept: DIABETES | Facility: CLINIC | Age: 78
End: 2023-05-03

## 2023-05-03 DIAGNOSIS — E11.65 UNCONTROLLED TYPE 2 DIABETES MELLITUS WITH HYPERGLYCEMIA, WITH LONG-TERM CURRENT USE OF INSULIN: Primary | ICD-10-CM

## 2023-05-03 DIAGNOSIS — E11.59 HYPERTENSION ASSOCIATED WITH DIABETES: ICD-10-CM

## 2023-05-03 DIAGNOSIS — Z79.4 UNCONTROLLED TYPE 2 DIABETES MELLITUS WITH HYPERGLYCEMIA, WITH LONG-TERM CURRENT USE OF INSULIN: Primary | ICD-10-CM

## 2023-05-03 DIAGNOSIS — I15.2 HYPERTENSION ASSOCIATED WITH DIABETES: ICD-10-CM

## 2023-05-03 DIAGNOSIS — E78.5 HYPERLIPIDEMIA ASSOCIATED WITH TYPE 2 DIABETES MELLITUS: ICD-10-CM

## 2023-05-03 DIAGNOSIS — E11.69 HYPERLIPIDEMIA ASSOCIATED WITH TYPE 2 DIABETES MELLITUS: ICD-10-CM

## 2023-05-03 DIAGNOSIS — E11.3513 PROLIFERATIVE DIABETIC RETINOPATHY OF BOTH EYES WITH MACULAR EDEMA ASSOCIATED WITH TYPE 2 DIABETES MELLITUS: ICD-10-CM

## 2023-05-03 PROCEDURE — 99442 PR PHYSICIAN TELEPHONE EVALUATION 11-20 MIN: CPT | Mod: 95,,, | Performed by: NURSE PRACTITIONER

## 2023-05-03 PROCEDURE — 99442 PR PHYSICIAN TELEPHONE EVALUATION 11-20 MIN: ICD-10-PCS | Mod: 95,,, | Performed by: NURSE PRACTITIONER

## 2023-05-03 NOTE — PROGRESS NOTES
Established Patient - Audio Only Telehealth Visit     The patient location is: [place]  The chief complaint leading to consultation is: diabetes management follow up   Visit type: Virtual visit with audio only (telephone)  Total time spent with patient: 15 minutes       The reason for the audio only service rather than synchronous audio and video virtual visit was related to technical difficulties or patient preference/necessity.     Each patient to whom I provide medical services by telemedicine is:  (1) informed of the relationship between the physician and patient and the respective role of any other health care provider with respect to management of the patient; and (2) notified that they may decline to receive medical services by telemedicine and may withdraw from such care at any time. Patient verbally consented to receive this service via voice-only telephone call.       HPI:  Patient unable to complete a virtual visit. Requested phone call visit. He lives in an assisted living [facility] and has [facility] nursing support. He needs Dexcom supplies re-ordered- he has been out of supplies for a month and a half. Currently checking Bgs TID while waiting on supplies - states Bgs vary, better when on the Dexcom.   States fasting sugar this AM of 198 mg/dL. He was nervous to take Novolog due to fear of hypoglycemia.  Pre-lunch glucose of 124 mg/dL.   Reports a recent hypoglycemic event of 40 in the morning. States this has been the only time recent. Bgs are typically around 130 in the morning,sometimes higher.     He has an upcoming appt with PCP later this month.    [facility]     Will send referral for G7.     Lives at assisted living - needs appointments in  on a Tuesday.    Wants to get Dexcom supplies as a 90 day supply.    DR- Followed by Dr. Tucker.    Diabetes Medications               insulin aspart U-100 (NOVOLOG) 100 unit/mL (3 mL) InPn pen Inject 12 Units into the skin 3 (three)  times daily with meals.    insulin glargine (LANTUS) 100 unit/mL injection Inject 50 Units into the skin every evening.           Diabetes Management Status    Statin: Taking  ACE/ARB: Not taking    Screening or Prevention Patient's value Goal Complete/Controlled?   HgA1C Testing and Control   Lab Results   Component Value Date    HGBA1C 7.1 (H) 07/05/2022      Annually/Less than 8% Yes     Lipid profile : 04/12/2022 Annually No     LDL control Lab Results   Component Value Date    LDLCALC 63.6 04/12/2022    Annually/Less than 100 mg/dl  No     Nephropathy screening Lab Results   Component Value Date    LABMICR 1001.0 07/07/2022     No results found for: PROTEINUA  No results found for: UTPCR   Annually Yes     Blood pressure BP Readings from Last 1 Encounters:   07/28/22 (!) 91/51    Less than 140/90 Yes     Dilated retinal exam : 08/03/2022 Annually Yes     Foot exam   : 04/26/2022 Annually No Deferred-audio visit           Assessment and plan:      Mono was seen today for diabetes mellitus.    Diagnoses and all orders for this visit:    Uncontrolled type 2 diabetes mellitus with hyperglycemia, with long-term current use of insulin    Medications adjusted for today's visit include:    Referral Dexcom G7.    Continue Lantus 50 units daily.    Continue Novolog 12 units three times a day if pre meal glucose is over 200.     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  Prevailing standard of care was able to be met in this audio-only visit.

## 2023-05-03 NOTE — PATIENT INSTRUCTIONS
Medications adjusted for today's visit include:    Referral Dexcom G7.    Continue Lantus 50 units daily.    Continue Novolog 12 units three times a day if pre meal glucose is over 200.     --Carry glucose tablets/soft peppermints/regular juice or Coke product with you at all times to treat a low blood sugar episode (less than 70). If your blood sugar is between 50-70, Chew 4 tablets or drink 1/2 cup of juice or regular Coke product. If your blood sugar is below 50, double the treatment. Re-check blood sugar in 15 minutes. If still low, repeat this. Always call the clinic to give an update for any low blood sugar episodes.    --Follow-up for your next visit in 3 months    --Please either drop off, fax, or MyChart your readings to me as needed.

## 2023-05-03 NOTE — Clinical Note
Please send Dexcom G7 (sensors and ) to [facility]. Can we see about getting him a 90 day supply? Also, schedule a 3 month appt with me on a Tues between 9-2. Will need to be scheduled as an audio visit if not in person.  Notes:Dexcom

## 2023-05-03 NOTE — TELEPHONE ENCOUNTER
----- Message from Olamide Watkins NP sent at 5/3/2023  2:21 PM CDT -----  Please send Dexcom G7 (sensors and ) to [facility]. Can we see about getting him a 90 day supply? Also, schedule a 3 month appt with me on a Tues between 9-2. Will need to be scheduled as an audio visit if not in person.  Notes:Dexcom

## 2023-05-12 ENCOUNTER — TELEPHONE (OUTPATIENT)
Dept: DIABETES | Facility: CLINIC | Age: 78
End: 2023-05-12
Payer: OTHER GOVERNMENT

## 2023-05-16 ENCOUNTER — TELEPHONE (OUTPATIENT)
Dept: DIABETES | Facility: CLINIC | Age: 78
End: 2023-05-16
Payer: OTHER GOVERNMENT

## 2023-05-16 NOTE — TELEPHONE ENCOUNTER
Advise pt that I have spoke with [facility] and his orders should be delivered by the end of the day tomorrow. He voice understanding.

## 2023-05-16 NOTE — TELEPHONE ENCOUNTER
----- Message from Alecia Hernandez sent at 5/16/2023 12:48 PM CDT -----  Contact: Mono Patel is calling in regards to his Dex com Machine.Please call back at [phone]          Thanks  HÉCTOR

## 2023-05-19 ENCOUNTER — TELEPHONE (OUTPATIENT)
Dept: DIABETES | Facility: CLINIC | Age: 78
End: 2023-05-19
Payer: OTHER GOVERNMENT

## 2023-05-19 NOTE — TELEPHONE ENCOUNTER
----- Message from Donny Woodward sent at 5/19/2023 12:16 PM CDT -----  Contact: Patient  Mono Bergman would like a call back at [phone]. Pt states he is having issues with his dexcom machine.

## 2023-05-22 ENCOUNTER — OFFICE VISIT (OUTPATIENT)
Dept: INTERNAL MEDICINE | Facility: CLINIC | Age: 78
End: 2023-05-22
Payer: MEDICARE

## 2023-05-22 VITALS
BODY MASS INDEX: 37.25 KG/M2 | DIASTOLIC BLOOD PRESSURE: 62 MMHG | HEART RATE: 62 BPM | WEIGHT: 245.81 LBS | HEIGHT: 68 IN | SYSTOLIC BLOOD PRESSURE: 108 MMHG | TEMPERATURE: 97 F

## 2023-05-22 DIAGNOSIS — E78.5 DYSLIPIDEMIA ASSOCIATED WITH TYPE 2 DIABETES MELLITUS: ICD-10-CM

## 2023-05-22 DIAGNOSIS — E11.59 HYPERTENSION ASSOCIATED WITH DIABETES: ICD-10-CM

## 2023-05-22 DIAGNOSIS — E66.01 SEVERE OBESITY (BMI 35.0-39.9) WITH COMORBIDITY: ICD-10-CM

## 2023-05-22 DIAGNOSIS — I15.2 HYPERTENSION ASSOCIATED WITH DIABETES: ICD-10-CM

## 2023-05-22 DIAGNOSIS — Z12.5 PROSTATE CANCER SCREENING: ICD-10-CM

## 2023-05-22 DIAGNOSIS — I73.9 PERIPHERAL VASCULAR DISEASE: ICD-10-CM

## 2023-05-22 DIAGNOSIS — E11.65 UNCONTROLLED TYPE 2 DIABETES MELLITUS WITH HYPERGLYCEMIA: Primary | ICD-10-CM

## 2023-05-22 DIAGNOSIS — N18.32 STAGE 3B CHRONIC KIDNEY DISEASE: ICD-10-CM

## 2023-05-22 DIAGNOSIS — E11.69 DYSLIPIDEMIA ASSOCIATED WITH TYPE 2 DIABETES MELLITUS: ICD-10-CM

## 2023-05-22 PROCEDURE — 99999 PR PBB SHADOW E&M-EST. PATIENT-LVL III: CPT | Mod: PBBFAC,,, | Performed by: FAMILY MEDICINE

## 2023-05-22 PROCEDURE — 99214 OFFICE O/P EST MOD 30 MIN: CPT | Mod: S$PBB,,, | Performed by: FAMILY MEDICINE

## 2023-05-22 PROCEDURE — 99213 OFFICE O/P EST LOW 20 MIN: CPT | Mod: PBBFAC,PO | Performed by: FAMILY MEDICINE

## 2023-05-22 PROCEDURE — 99214 PR OFFICE/OUTPT VISIT, EST, LEVL IV, 30-39 MIN: ICD-10-PCS | Mod: S$PBB,,, | Performed by: FAMILY MEDICINE

## 2023-05-22 PROCEDURE — 99999 PR PBB SHADOW E&M-EST. PATIENT-LVL III: ICD-10-PCS | Mod: PBBFAC,,, | Performed by: FAMILY MEDICINE

## 2023-05-22 NOTE — PROGRESS NOTES
"Subjective:      Patient ID: Mono Bergman is a 77 y.o. male.    Chief Complaint: Annual Exam    HPI  78 yo male here to update labs and his [facility] doctor told him he may need to see a nephrologist.  Has hx of CKD.  No falls  Mood is stable//occ down.  Living at [facility]  BG this     Past Medical History:   Diagnosis Date    Arthritis     Cataract     Colon polyps 10/29/2013    Diabetes mellitus type II     Diabetic retinopathy     GERD (gastroesophageal reflux disease) 07/18/2013    Hyperlipidemia LDL goal < 70 07/18/2013    Hypertension     Low BP    JOEL on CPAP     Sleep apnea, unspecified     Uveitis 05/11/2016     Family History   Problem Relation Age of Onset    Heart disease Mother     Thyroid cancer Maternal Grandmother     Colon cancer Neg Hx      Past Surgical History:   Procedure Laterality Date    CATARACT EXTRACTION W/  INTRAOCULAR LENS IMPLANT Left 12/10/14    CATARACT EXTRACTION W/  INTRAOCULAR LENS IMPLANT Right 07/13/2016    CHOLECYSTECTOMY      COLONOSCOPY N/A 2/3/2017    Procedure: COLONOSCOPY;  Surgeon: Natan Magdaleno MD;  Location: [facility];  Service: Endoscopy;  Laterality: N/A;    EYE SURGERY      KNEE SURGERY  1971    left knee    rk rt. eye       Social History     Tobacco Use    Smoking status: Never     Passive exposure: Never    Smokeless tobacco: Never   Substance Use Topics    Alcohol use: No    Drug use: No       /62   Pulse 62   Temp 97.4 °F (36.3 °C) (Tympanic)   Ht 5' 8" (1.727 m)   Wt 111.5 kg (245 lb 13 oz)   BMI 37.38 kg/m²     Review of Systems   Constitutional:  Negative for activity change, appetite change, chills, diaphoresis, fatigue, fever and unexpected weight change.   HENT:  Positive for hearing loss. Negative for tinnitus.    Eyes:  Negative for visual disturbance.   Respiratory:  Negative for cough, chest tightness, shortness of breath and wheezing.    Cardiovascular:  Negative for chest pain, palpitations and leg swelling. "   Gastrointestinal:  Negative for abdominal distention and abdominal pain.   Genitourinary:  Negative for difficulty urinating.   Neurological:  Negative for dizziness, weakness and headaches.     Objective:     Physical Exam  Vitals and nursing note reviewed.   Constitutional:       General: He is not in acute distress.     Appearance: He is well-developed. He is not diaphoretic.   HENT:      Head: Normocephalic and atraumatic.   Eyes:      General:         Right eye: No discharge.         Left eye: No discharge.   Cardiovascular:      Rate and Rhythm: Normal rate and regular rhythm.   Pulmonary:      Effort: Pulmonary effort is normal. No respiratory distress.   Neurological:      Mental Status: He is alert and oriented to person, place, and time.   Psychiatric:         Behavior: Behavior normal.         Thought Content: Thought content normal.         Judgment: Judgment normal.       Lab Results   Component Value Date    WBC 8.06 07/05/2022    HGB 12.3 (L) 07/05/2022    HCT 37.7 (L) 07/05/2022     07/05/2022    CHOL 118 (L) 04/12/2022    TRIG 132 04/12/2022    HDL 28 (L) 04/12/2022    ALT 18 04/12/2022    AST 21 04/12/2022     04/12/2022    K 4.3 04/12/2022     04/12/2022    CREATININE 1.9 (H) 04/12/2022    BUN 18 04/12/2022    CO2 29 04/12/2022    TSH 1.746 04/12/2022    PSA 3.9 04/12/2022    INR 1.0 05/01/2017    HGBA1C 7.1 (H) 07/05/2022       Assessment:     1. Uncontrolled type 2 diabetes mellitus with hyperglycemia    2. Hypertension associated with diabetes    3. Dyslipidemia associated with type 2 diabetes mellitus    4. Prostate cancer screening    5. Stage 3b chronic kidney disease    6. Severe obesity (BMI 35.0-39.9) with comorbidity    7. Peripheral vascular disease         Plan:     Uncontrolled type 2 diabetes mellitus with hyperglycemia  -     Hemoglobin A1C; Future; Expected date: 05/22/2023  -     Microalbumin/Creatinine Ratio, Urine; Future; Expected date:  05/22/2023    Hypertension associated with diabetes  -     CBC Auto Differential; Future; Expected date: 05/22/2023  -     Comprehensive Metabolic Panel; Future; Expected date: 05/22/2023    Dyslipidemia associated with type 2 diabetes mellitus  -     Lipid Panel; Future; Expected date: 05/22/2023  -     TSH; Future; Expected date: 05/22/2023    Prostate cancer screening  -     PSA, Screening; Future; Expected date: 05/22/2023    Stage 3b chronic kidney disease    Severe obesity (BMI 35.0-39.9) with comorbidity  Comments:  Not active//eats what he wants    Peripheral vascular disease  Comments:  Stable/on statin/ASA      Update labs today  Cont meds  [facility] per [facility] visits  Fu annually and PRN  Will call with results and recs//Nephro if needed

## 2023-05-23 ENCOUNTER — TELEPHONE (OUTPATIENT)
Dept: INTERNAL MEDICINE | Facility: CLINIC | Age: 78
End: 2023-05-23
Payer: OTHER GOVERNMENT

## 2023-05-23 DIAGNOSIS — E11.65 UNCONTROLLED TYPE 2 DIABETES MELLITUS WITH HYPERGLYCEMIA: Primary | ICD-10-CM

## 2023-05-23 NOTE — TELEPHONE ENCOUNTER
Nidia said, pt came back to the clinic with the urine cup and bag. She wasn't sure if pt was supposed to bring a urine back. Explained to her that [facility] She said, they brought the urine to the clinic in [place], but was told there were no orders. Informed her that I will put the orders in. She said, they will bring the urine back to the [place] clinic. Orders entered.

## 2023-05-23 NOTE — TELEPHONE ENCOUNTER
----- Message from Alfonso Kwok sent at 5/23/2023 12:26 PM CDT -----  Regarding: pt call bk  Name of Who is Calling:Taylor Akers         What is the request in detail: Inquiring if pt needs an urine sampler please contact           Can the clinic reply by [facility]: no         What Number to Call Back if not in [facility]:[phone] Nidia

## 2023-05-24 ENCOUNTER — PATIENT MESSAGE (OUTPATIENT)
Dept: INTERNAL MEDICINE | Facility: CLINIC | Age: 78
End: 2023-05-24
Payer: OTHER GOVERNMENT

## 2023-05-24 NOTE — TELEPHONE ENCOUNTER
Pls see my note to pt on labs and call him with that message.  He says his mychart is not always working.

## 2023-05-25 ENCOUNTER — TELEPHONE (OUTPATIENT)
Dept: INTERNAL MEDICINE | Facility: CLINIC | Age: 78
End: 2023-05-25
Payer: OTHER GOVERNMENT

## 2023-05-25 NOTE — TELEPHONE ENCOUNTER
Spoke with patient and he stated that he has not been on his dexcom for the past 3 1/2 mths but he is back on the dexcom as of today. He also stated that he was given a urine cup to take home and bring to the [place] location to drop off but they would not take  his urine specimen.

## 2023-05-25 NOTE — LETTER
May 25, 2023    Mono Bergman  [address]  [address]  [place] [address]                    [facility]  [address]  [place] [address]  Phone: [phone]  Fax: [phone] This is a reminder letter to inform you of your appointment with your Primary Care doctor, Dr Hermelindo Ash on November 13, 2023 at 9:20 am. If you need to reschedule or have any questions about your appointment please call [phone].         Thank You!

## 2023-05-26 NOTE — TELEPHONE ENCOUNTER
Pt said, he left the urine there and it has since been discarded. He doesn't have transportation to go back to lab and he doesn't have anyone to come  another specimen cup and drop it off. He said, the ladies at the assisted living were nice enough to bring the urine the first time. That's not something that they normally do. He said, they were not happy about what took place about the lab at the [place] location. I apologized to him and informed him that I will notify my manager. He verbalized understanding.

## 2023-05-30 ENCOUNTER — OFFICE VISIT (OUTPATIENT)
Dept: OPHTHALMOLOGY | Facility: CLINIC | Age: 78
End: 2023-05-30
Payer: MEDICARE

## 2023-05-30 DIAGNOSIS — H40.62X4 GLAUCOMA OF LEFT EYE SECONDARY TO DRUGS, INDETERMINATE STAGE: ICD-10-CM

## 2023-05-30 DIAGNOSIS — Z96.1 PSEUDOPHAKIA: ICD-10-CM

## 2023-05-30 DIAGNOSIS — E11.3393 TYPE 2 DIABETES MELLITUS WITH BOTH EYES AFFECTED BY MODERATE NONPROLIFERATIVE RETINOPATHY WITHOUT MACULAR EDEMA, WITH LONG-TERM CURRENT USE OF INSULIN: Primary | ICD-10-CM

## 2023-05-30 DIAGNOSIS — Z79.4 TYPE 2 DIABETES MELLITUS WITH BOTH EYES AFFECTED BY MODERATE NONPROLIFERATIVE RETINOPATHY WITHOUT MACULAR EDEMA, WITH LONG-TERM CURRENT USE OF INSULIN: Primary | ICD-10-CM

## 2023-05-30 PROCEDURE — 92014 COMPRE OPH EXAM EST PT 1/>: CPT | Mod: S$PBB,,, | Performed by: OPHTHALMOLOGY

## 2023-05-30 PROCEDURE — 92134 CPTRZ OPH DX IMG PST SGM RTA: CPT | Mod: PBBFAC | Performed by: OPHTHALMOLOGY

## 2023-05-30 PROCEDURE — 99999 PR PBB SHADOW E&M-EST. PATIENT-LVL III: CPT | Mod: PBBFAC,,, | Performed by: OPHTHALMOLOGY

## 2023-05-30 PROCEDURE — 92134 POSTERIOR SEGMENT OCT RETINA (OCULAR COHERENCE TOMOGRAPHY)-BOTH EYES: ICD-10-PCS | Mod: 26,S$PBB,, | Performed by: OPHTHALMOLOGY

## 2023-05-30 PROCEDURE — 99213 OFFICE O/P EST LOW 20 MIN: CPT | Mod: PBBFAC | Performed by: OPHTHALMOLOGY

## 2023-05-30 PROCEDURE — 92014 PR EYE EXAM, EST PATIENT,COMPREHESV: ICD-10-PCS | Mod: S$PBB,,, | Performed by: OPHTHALMOLOGY

## 2023-05-30 PROCEDURE — 99999 PR PBB SHADOW E&M-EST. PATIENT-LVL III: ICD-10-PCS | Mod: PBBFAC,,, | Performed by: OPHTHALMOLOGY

## 2023-05-30 NOTE — PROGRESS NOTES
===============================  Date today is 5/30/2023  Mono Bergman is a 77 y.o. male  Last visit [facility]: :10/11/2022   Last visit eye dept. Visit date not found    Corrected distance visual acuity was 20/200 in the right eye and 20/200 in the left eye.  Tonometry       Tonometry (Applanation, 11:03 AM)         Right Left    Pressure 21 25              Max Pressure         Right Left    Max 22 31 (7/27/2021)                  Not recorded       Not recorded       Not recorded       Chief Complaint   Patient presents with    PDR/ME     7 MONTHS LOST TO FOLLOW UP     HPI     PDR/ME     Additional comments: 7 MONTHS LOST TO FOLLOW UP           Comments    1.) + DM SINCE 1997   2.) + PDR OU/ CSME OS   Bilateral PRP   PPV OS with Dr. 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 TEARS QID OS PRN  REFRESH GEL BID OS   OMEGA E BID P.O.     Eylea OD last 4/7/22, 5/10/22  6/2/22 7/21/22  9/8/22  Eylea OS last 2/10/22  Ozurdex OS 3/26/2021 (steroid responder - IOP in 40s)          Last edited by Laya Carvalho on 5/30/2023 10:54 AM.      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  Check  OU independently daily.    Gave my office and personal cell phone number.  ________________  5/30/2023 today  Mono Bergman    DM with inactive BDR  OCT stable  Post PRP OU  No activity seen  Treatment won't help at this time  Will watch for now  PCIOL OU  IOP ok    RTC 4-5 months  Instructed to call 24/7 for any worsening of vision or symptoms. Check OU daily.   Gave my office and cell phone number.        =============================

## 2023-08-03 ENCOUNTER — OFFICE VISIT (OUTPATIENT)
Dept: DIABETES | Facility: CLINIC | Age: 78
End: 2023-08-03
Payer: MEDICARE

## 2023-08-03 DIAGNOSIS — E11.65 UNCONTROLLED TYPE 2 DIABETES MELLITUS WITH HYPERGLYCEMIA, WITH LONG-TERM CURRENT USE OF INSULIN: Primary | ICD-10-CM

## 2023-08-03 DIAGNOSIS — I15.2 HYPERTENSION ASSOCIATED WITH DIABETES: ICD-10-CM

## 2023-08-03 DIAGNOSIS — E11.69 HYPERLIPIDEMIA ASSOCIATED WITH TYPE 2 DIABETES MELLITUS: ICD-10-CM

## 2023-08-03 DIAGNOSIS — E11.3513 PROLIFERATIVE DIABETIC RETINOPATHY OF BOTH EYES WITH MACULAR EDEMA ASSOCIATED WITH TYPE 2 DIABETES MELLITUS: ICD-10-CM

## 2023-08-03 DIAGNOSIS — E11.59 HYPERTENSION ASSOCIATED WITH DIABETES: ICD-10-CM

## 2023-08-03 DIAGNOSIS — Z79.4 UNCONTROLLED TYPE 2 DIABETES MELLITUS WITH HYPERGLYCEMIA, WITH LONG-TERM CURRENT USE OF INSULIN: Primary | ICD-10-CM

## 2023-08-03 DIAGNOSIS — E78.5 HYPERLIPIDEMIA ASSOCIATED WITH TYPE 2 DIABETES MELLITUS: ICD-10-CM

## 2023-08-03 PROCEDURE — 1160F RVW MEDS BY RX/DR IN RCRD: CPT | Mod: CPTII,95,, | Performed by: NURSE PRACTITIONER

## 2023-08-03 PROCEDURE — 99442 PR PHYSICIAN TELEPHONE EVALUATION 11-20 MIN: CPT | Mod: 95,,, | Performed by: NURSE PRACTITIONER

## 2023-08-03 PROCEDURE — 3051F HG A1C>EQUAL 7.0%<8.0%: CPT | Mod: CPTII,95,, | Performed by: NURSE PRACTITIONER

## 2023-08-03 PROCEDURE — 1160F PR REVIEW ALL MEDS BY PRESCRIBER/CLIN PHARMACIST DOCUMENTED: ICD-10-PCS | Mod: CPTII,95,, | Performed by: NURSE PRACTITIONER

## 2023-08-03 PROCEDURE — 3051F PR MOST RECENT HEMOGLOBIN A1C LEVEL 7.0 - < 8.0%: ICD-10-PCS | Mod: CPTII,95,, | Performed by: NURSE PRACTITIONER

## 2023-08-03 PROCEDURE — 1159F MED LIST DOCD IN RCRD: CPT | Mod: CPTII,95,, | Performed by: NURSE PRACTITIONER

## 2023-08-03 PROCEDURE — 1159F PR MEDICATION LIST DOCUMENTED IN MEDICAL RECORD: ICD-10-PCS | Mod: CPTII,95,, | Performed by: NURSE PRACTITIONER

## 2023-08-03 PROCEDURE — 99442 PR PHYSICIAN TELEPHONE EVALUATION 11-20 MIN: ICD-10-PCS | Mod: 95,,, | Performed by: NURSE PRACTITIONER

## 2023-08-03 NOTE — Clinical Note
Patient needs to be set up for formal G7 training in person with one of the educators - I placed the referral. The med pass nurse that helps him is available from 6-10a. He lives in an assisted living facility. If they need to do this virtually, we just need to coordinate a time with the nurse. The number to the assisted living facility is [phone]. Needs a 3 month follow up visit with me. Notes: Dexcom

## 2023-08-03 NOTE — PATIENT INSTRUCTIONS
Medications adjusted for today's visit include:    Needs training for Dexcom G7.    Continue Lantus 50 units daily.    Continue Novolog 12 units three times a day if pre meal glucose is over 200.     --Carry glucose tablets/soft peppermints/regular juice or Coke product with you at all times to treat a low blood sugar episode (less than 70). If your blood sugar is between 50-70, Chew 4 tablets or drink 1/2 cup of juice or regular Coke product. If your blood sugar is below 50, double the treatment. Re-check blood sugar in 15 minutes. If still low, repeat this. Always call the clinic to give an update for any low blood sugar episodes.    --Follow-up for your next visit in 3 months    --Please either drop off, fax, or MyChart your readings to me as needed.                                                    
16-Mar-2020 15:00

## 2023-08-03 NOTE — PROGRESS NOTES
Established Patient - Audio Only Telehealth Visit     The patient location is: [place]  The chief complaint leading to consultation is: diabetes management follow up   Visit type: Virtual visit with audio only (telephone)  Total time spent with patient: 13 minutes       The reason for the audio only service rather than synchronous audio and video virtual visit was related to technical difficulties or patient preference/necessity.     Each patient to whom I provide medical services by telemedicine is:  (1) informed of the relationship between the physician and patient and the respective role of any other health care provider with respect to management of the patient; and (2) notified that they may decline to receive medical services by telemedicine and may withdraw from such care at any time. Patient verbally consented to receive this service via voice-only telephone call.       HPI:  Per Dexcom download, for the last 14 days: Avg glucose of 218 mg/dL. He is only 24% in range. 47% of readings are high, with 28% of readings > 250. <1% mild hypoglycemia, with <1% significant. He has a pattern of glucose reduction overnight, not leading to hypoglycemia and evening/bedtime Bgs are markedly elevated. He snacks often between meals. We discussed needing to limit snacks/move higher carb items to when he is taking his mealtime insulin. We discussed Dexcom G7 and he would like to upgrade.        Diabetes Medications               insulin aspart U-100 (NOVOLOG) 100 unit/mL (3 mL) InPn pen Inject 12 Units into the skin 3 (three) times daily with meals.    insulin glargine (LANTUS) 100 unit/mL injection Inject 50 Units into the skin every evening.              Assessment and plan:      Diagnoses and all orders for this visit:    Uncontrolled type 2 diabetes mellitus with hyperglycemia, with long-term current use of insulin  -     Ambulatory referral/consult to Diabetes Education; Future    Chronic-    Medications adjusted for  today's visit include:    Needs training for Dexcom G7.    Continue Lantus 50 units daily.    Continue Novolog 12 units three times a day if pre meal glucose is over 200.     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  Prevailing standard of care was able to be met in this audio-only visit.

## 2023-08-04 ENCOUNTER — TELEPHONE (OUTPATIENT)
Dept: DIABETES | Facility: CLINIC | Age: 78
End: 2023-08-04
Payer: MEDICARE

## 2023-08-04 NOTE — TELEPHONE ENCOUNTER
Spoke with Nidia,  regarding scheduling education appt for Dexcom training. Nidia requested we call his personal nurse (Arabella Yi) after 8/14 to schedule appt.

## 2023-08-15 ENCOUNTER — TELEPHONE (OUTPATIENT)
Dept: DIABETES | Facility: CLINIC | Age: 78
End: 2023-08-15
Payer: MEDICARE

## 2023-08-15 NOTE — TELEPHONE ENCOUNTER
Spoke with Arabella Park (nurse ) at Assisted living, she informed me that they will place the G7 and will call if they have any issues. Olamide informed

## 2023-08-15 NOTE — TELEPHONE ENCOUNTER
----- Message from Olamide Watkins NP sent at 8/3/2023  1:56 PM CDT -----  Patient needs to be set up for formal G7 training in person with one of the educators - I placed the referral. The med pass nurse that helps him is available from 6-10a. He lives in an assisted living facility. If they need to do this virtually, we just need to coordinate a time with the nurse. The number to the assisted living facility is [phone]. Needs a 3 month follow up visit with me. Notes: Dexcom

## 2023-08-31 ENCOUNTER — HOSPITAL ENCOUNTER (OUTPATIENT)
Facility: HOSPITAL | Age: 78
Discharge: HOME | End: 2023-09-01
Attending: EMERGENCY MEDICINE | Admitting: FAMILY MEDICINE
Payer: MEDICARE

## 2023-08-31 DIAGNOSIS — E66.01 MORBID OBESITY: ICD-10-CM

## 2023-08-31 DIAGNOSIS — R06.02 SHORTNESS OF BREATH: ICD-10-CM

## 2023-08-31 DIAGNOSIS — I50.9 HEART FAILURE: ICD-10-CM

## 2023-08-31 DIAGNOSIS — G47.33 OSA ON CPAP: ICD-10-CM

## 2023-08-31 DIAGNOSIS — J96.01 ACUTE HYPOXEMIC RESPIRATORY FAILURE: Primary | ICD-10-CM

## 2023-08-31 DIAGNOSIS — R07.9 CHEST PAIN: ICD-10-CM

## 2023-08-31 DIAGNOSIS — R10.12 LEFT UPPER QUADRANT ABDOMINAL PAIN: ICD-10-CM

## 2023-08-31 PROBLEM — K59.00 CONSTIPATION: Status: ACTIVE | Noted: 2023-08-31

## 2023-08-31 PROBLEM — R79.89 ELEVATED TROPONIN: Status: ACTIVE | Noted: 2023-08-31

## 2023-08-31 PROBLEM — N17.9 AKI (ACUTE KIDNEY INJURY): Status: ACTIVE | Noted: 2023-08-31

## 2023-08-31 LAB
ALBUMIN SERPL BCP-MCNC: 3.5 G/DL (ref 3.5–5.2)
ALLENS TEST: ABNORMAL
ALP SERPL-CCNC: 184 U/L (ref 55–135)
ALT SERPL W/O P-5'-P-CCNC: 36 U/L (ref 10–44)
ANION GAP SERPL CALC-SCNC: 9 MMOL/L (ref 8–16)
AORTIC ROOT ANNULUS: 3.34 CM
ASCENDING AORTA: 3.31 CM
AST SERPL-CCNC: 33 U/L (ref 10–40)
AV INDEX (PROSTH): 0.86
AV MEAN GRADIENT: 5 MMHG
AV PEAK GRADIENT: 7 MMHG
AV VALVE AREA BY VELOCITY RATIO: 2.81 CM²
AV VALVE AREA: 2.95 CM²
AV VELOCITY RATIO: 0.82
BASOPHILS # BLD AUTO: 0.06 K/UL (ref 0–0.2)
BASOPHILS NFR BLD: 0.7 % (ref 0–1.9)
BILIRUB SERPL-MCNC: 0.6 MG/DL (ref 0.1–1)
BNP SERPL-MCNC: 239 PG/ML (ref 0–99)
BSA FOR ECHO PROCEDURE: 2.42 M2
BUN SERPL-MCNC: 16 MG/DL (ref 8–23)
CALCIUM SERPL-MCNC: 8.5 MG/DL (ref 8.7–10.5)
CHLORIDE SERPL-SCNC: 106 MMOL/L (ref 95–110)
CO2 SERPL-SCNC: 24 MMOL/L (ref 23–29)
CREAT SERPL-MCNC: 1.9 MG/DL (ref 0.5–1.4)
CTP QC/QA: YES
CV ECHO LV RWT: 0.74 CM
DELSYS: ABNORMAL
DIFFERENTIAL METHOD: ABNORMAL
DOP CALC AO PEAK VEL: 1.34 M/S
DOP CALC AO VTI: 24.9 CM
DOP CALC LVOT AREA: 3.4 CM2
DOP CALC LVOT DIAMETER: 2.09 CM
DOP CALC LVOT PEAK VEL: 1.1 M/S
DOP CALC LVOT STROKE VOLUME: 73.38 CM3
DOP CALC MV VTI: 30.2 CM
DOP CALC RVOT PEAK VEL: 0.96 M/S
DOP CALC RVOT VTI: 16.9 CM
DOP CALCLVOT PEAK VEL VTI: 21.4 CM
E WAVE DECELERATION TIME: 207.15 MSEC
E/A RATIO: 0.77
E/E' RATIO: 14.15 M/S
ECHO LV POSTERIOR WALL: 1.53 CM (ref 0.6–1.1)
EJECTION FRACTION: 65 %
EOSINOPHIL # BLD AUTO: 0.4 K/UL (ref 0–0.5)
EOSINOPHIL NFR BLD: 4.8 % (ref 0–8)
ERYTHROCYTE [DISTWIDTH] IN BLOOD BY AUTOMATED COUNT: 13 % (ref 11.5–14.5)
EST. GFR  (NO RACE VARIABLE): 36 ML/MIN/1.73 M^2
ESTIMATED AVG GLUCOSE: 183 MG/DL (ref 68–131)
FIO2: 28
FLOW: 2
FRACTIONAL SHORTENING: 37 % (ref 28–44)
GLUCOSE SERPL-MCNC: 313 MG/DL (ref 70–110)
HBA1C MFR BLD: 8 % (ref 4–5.6)
HCO3 UR-SCNC: 24.3 MMOL/L (ref 24–28)
HCT VFR BLD AUTO: 34 % (ref 40–54)
HCV AB SERPL QL IA: NEGATIVE
HEP C VIRUS HOLD SPECIMEN: NORMAL
HGB BLD-MCNC: 11.3 G/DL (ref 14–18)
HIV 1+2 AB+HIV1 P24 AG SERPL QL IA: NEGATIVE
IMM GRANULOCYTES # BLD AUTO: 0.03 K/UL (ref 0–0.04)
IMM GRANULOCYTES NFR BLD AUTO: 0.4 % (ref 0–0.5)
INTERVENTRICULAR SEPTUM: 1.64 CM (ref 0.6–1.1)
IVC DIAMETER: 1.8 CM
IVRT: 49.48 MSEC
LA MAJOR: 4.49 CM
LA MINOR: 4.77 CM
LA WIDTH: 3.3 CM
LEFT ATRIUM SIZE: 3.44 CM
LEFT ATRIUM VOLUME INDEX: 19.2 ML/M2
LEFT ATRIUM VOLUME: 44.63 CM3
LEFT INTERNAL DIMENSION IN SYSTOLE: 2.6 CM (ref 2.1–4)
LEFT VENTRICLE DIASTOLIC VOLUME INDEX: 32.29 ML/M2
LEFT VENTRICLE DIASTOLIC VOLUME: 74.91 ML
LEFT VENTRICLE MASS INDEX: 114 G/M2
LEFT VENTRICLE SYSTOLIC VOLUME INDEX: 10.6 ML/M2
LEFT VENTRICLE SYSTOLIC VOLUME: 24.49 ML
LEFT VENTRICULAR INTERNAL DIMENSION IN DIASTOLE: 4.12 CM (ref 3.5–6)
LEFT VENTRICULAR MASS: 264.75 G
LIPASE SERPL-CCNC: 10 U/L (ref 4–60)
LV LATERAL E/E' RATIO: 13.14 M/S
LV SEPTAL E/E' RATIO: 15.33 M/S
LVOT MG: 3.92 MMHG
LVOT MV: 0.99 CM/S
LYMPHOCYTES # BLD AUTO: 1.5 K/UL (ref 1–4.8)
LYMPHOCYTES NFR BLD: 17.7 % (ref 18–48)
MCH RBC QN AUTO: 29.8 PG (ref 27–31)
MCHC RBC AUTO-ENTMCNC: 33.2 G/DL (ref 32–36)
MCV RBC AUTO: 90 FL (ref 82–98)
MODE: ABNORMAL
MONOCYTES # BLD AUTO: 0.8 K/UL (ref 0.3–1)
MONOCYTES NFR BLD: 8.9 % (ref 4–15)
MV MEAN GRADIENT: 3 MMHG
MV PEAK A VEL: 1.2 M/S
MV PEAK E VEL: 0.92 M/S
MV PEAK GRADIENT: 8 MMHG
MV STENOSIS PRESSURE HALF TIME: 60.07 MS
MV VALVE AREA BY CONTINUITY EQUATION: 2.43 CM2
MV VALVE AREA P 1/2 METHOD: 3.66 CM2
NEUTROPHILS # BLD AUTO: 5.8 K/UL (ref 1.8–7.7)
NEUTROPHILS NFR BLD: 67.5 % (ref 38–73)
NRBC BLD-RTO: 0 /100 WBC
PCO2 BLDA: 38 MMHG (ref 35–45)
PH SMN: 7.41 [PH] (ref 7.35–7.45)
PISA TR MAX VEL: 1.67 M/S
PLATELET # BLD AUTO: 199 K/UL (ref 150–450)
PMV BLD AUTO: 9.5 FL (ref 9.2–12.9)
PO2 BLDA: 77 MMHG (ref 80–100)
POC BE: 0 MMOL/L
POC MOLECULAR INFLUENZA A AGN: NEGATIVE
POC MOLECULAR INFLUENZA B AGN: NEGATIVE
POC SATURATED O2: 96 % (ref 95–100)
POCT GLUCOSE: 291 MG/DL (ref 70–110)
POTASSIUM SERPL-SCNC: 4.4 MMOL/L (ref 3.5–5.1)
PROT SERPL-MCNC: 7.1 G/DL (ref 6–8.4)
PV MEAN GRADIENT: 3 MMHG
RA MAJOR: 3.4 CM
RA PRESSURE ESTIMATED: 3 MMHG
RA WIDTH: 2 CM
RBC # BLD AUTO: 3.79 M/UL (ref 4.6–6.2)
RV TB RVSP: 5 MMHG
SAMPLE: ABNORMAL
SARS-COV-2 RDRP RESP QL NAA+PROBE: NEGATIVE
SITE: ABNORMAL
SODIUM SERPL-SCNC: 139 MMOL/L (ref 136–145)
STJ: 3.88 CM
TDI LATERAL: 0.07 M/S
TDI SEPTAL: 0.06 M/S
TDI: 0.07 M/S
TR MAX PG: 11 MMHG
TRICUSPID ANNULAR PLANE SYSTOLIC EXCURSION: 2.3 CM
TROPONIN I SERPL DL<=0.01 NG/ML-MCNC: 0.07 NG/ML (ref 0–0.03)
TROPONIN I SERPL DL<=0.01 NG/ML-MCNC: 0.07 NG/ML (ref 0–0.03)
TROPONIN I SERPL DL<=0.01 NG/ML-MCNC: 0.08 NG/ML (ref 0–0.03)
TV REST PULMONARY ARTERY PRESSURE: 14 MMHG
WBC # BLD AUTO: 8.52 K/UL (ref 3.9–12.7)
Z-SCORE OF LEFT VENTRICULAR DIMENSION IN END DIASTOLE: -8.1
Z-SCORE OF LEFT VENTRICULAR DIMENSION IN END SYSTOLE: -6.03

## 2023-08-31 PROCEDURE — 25000242 PHARM REV CODE 250 ALT 637 W/ HCPCS: Performed by: EMERGENCY MEDICINE

## 2023-08-31 PROCEDURE — 86803 HEPATITIS C AB TEST: CPT | Performed by: EMERGENCY MEDICINE

## 2023-08-31 PROCEDURE — 94640 AIRWAY INHALATION TREATMENT: CPT

## 2023-08-31 PROCEDURE — G0378 HOSPITAL OBSERVATION PER HR: HCPCS

## 2023-08-31 PROCEDURE — 96374 THER/PROPH/DIAG INJ IV PUSH: CPT | Mod: 59

## 2023-08-31 PROCEDURE — 99900035 HC TECH TIME PER 15 MIN (STAT)

## 2023-08-31 PROCEDURE — 27000221 HC OXYGEN, UP TO 24 HOURS

## 2023-08-31 PROCEDURE — 25500020 PHARM REV CODE 255: Performed by: FAMILY MEDICINE

## 2023-08-31 PROCEDURE — 36415 COLL VENOUS BLD VENIPUNCTURE: CPT | Performed by: EMERGENCY MEDICINE

## 2023-08-31 PROCEDURE — 83036 HEMOGLOBIN GLYCOSYLATED A1C: CPT | Performed by: NURSE PRACTITIONER

## 2023-08-31 PROCEDURE — 84484 ASSAY OF TROPONIN QUANT: CPT | Mod: 91 | Performed by: NURSE PRACTITIONER

## 2023-08-31 PROCEDURE — 83690 ASSAY OF LIPASE: CPT | Performed by: EMERGENCY MEDICINE

## 2023-08-31 PROCEDURE — 93010 EKG 12-LEAD: ICD-10-PCS | Mod: ,,, | Performed by: STUDENT IN AN ORGANIZED HEALTH CARE EDUCATION/TRAINING PROGRAM

## 2023-08-31 PROCEDURE — 27000190 HC CPAP FULL FACE MASK W/VALVE

## 2023-08-31 PROCEDURE — 87389 HIV-1 AG W/HIV-1&-2 AB AG IA: CPT | Performed by: EMERGENCY MEDICINE

## 2023-08-31 PROCEDURE — 36600 WITHDRAWAL OF ARTERIAL BLOOD: CPT

## 2023-08-31 PROCEDURE — 94660 CPAP INITIATION&MGMT: CPT

## 2023-08-31 PROCEDURE — 96372 THER/PROPH/DIAG INJ SC/IM: CPT | Mod: 59 | Performed by: NURSE PRACTITIONER

## 2023-08-31 PROCEDURE — 25000003 PHARM REV CODE 250: Performed by: FAMILY MEDICINE

## 2023-08-31 PROCEDURE — 80053 COMPREHEN METABOLIC PANEL: CPT | Performed by: EMERGENCY MEDICINE

## 2023-08-31 PROCEDURE — 93005 ELECTROCARDIOGRAM TRACING: CPT

## 2023-08-31 PROCEDURE — U0002 COVID-19 LAB TEST NON-CDC: HCPCS | Performed by: EMERGENCY MEDICINE

## 2023-08-31 PROCEDURE — 82803 BLOOD GASES ANY COMBINATION: CPT

## 2023-08-31 PROCEDURE — 99285 EMERGENCY DEPT VISIT HI MDM: CPT | Mod: 25

## 2023-08-31 PROCEDURE — 85025 COMPLETE CBC W/AUTO DIFF WBC: CPT | Performed by: EMERGENCY MEDICINE

## 2023-08-31 PROCEDURE — 25000003 PHARM REV CODE 250: Performed by: NURSE PRACTITIONER

## 2023-08-31 PROCEDURE — 36415 COLL VENOUS BLD VENIPUNCTURE: CPT | Performed by: NURSE PRACTITIONER

## 2023-08-31 PROCEDURE — 27100171 HC OXYGEN HIGH FLOW UP TO 24 HOURS

## 2023-08-31 PROCEDURE — 84484 ASSAY OF TROPONIN QUANT: CPT | Mod: 91 | Performed by: EMERGENCY MEDICINE

## 2023-08-31 PROCEDURE — 83880 ASSAY OF NATRIURETIC PEPTIDE: CPT | Performed by: EMERGENCY MEDICINE

## 2023-08-31 PROCEDURE — 94761 N-INVAS EAR/PLS OXIMETRY MLT: CPT

## 2023-08-31 PROCEDURE — 63600175 PHARM REV CODE 636 W HCPCS: Performed by: EMERGENCY MEDICINE

## 2023-08-31 PROCEDURE — 63600175 PHARM REV CODE 636 W HCPCS: Performed by: NURSE PRACTITIONER

## 2023-08-31 PROCEDURE — 93010 ELECTROCARDIOGRAM REPORT: CPT | Mod: ,,, | Performed by: STUDENT IN AN ORGANIZED HEALTH CARE EDUCATION/TRAINING PROGRAM

## 2023-08-31 PROCEDURE — 87502 INFLUENZA DNA AMP PROBE: CPT

## 2023-08-31 RX ORDER — IBUPROFEN 200 MG
24 TABLET ORAL
Status: DISCONTINUED | OUTPATIENT
Start: 2023-08-31 | End: 2023-09-01 | Stop reason: HOSPADM

## 2023-08-31 RX ORDER — GLUCAGON 1 MG
1 KIT INJECTION
Status: DISCONTINUED | OUTPATIENT
Start: 2023-08-31 | End: 2023-09-01 | Stop reason: HOSPADM

## 2023-08-31 RX ORDER — ONDANSETRON 2 MG/ML
4 INJECTION INTRAMUSCULAR; INTRAVENOUS EVERY 8 HOURS PRN
Status: DISCONTINUED | OUTPATIENT
Start: 2023-08-31 | End: 2023-09-01 | Stop reason: HOSPADM

## 2023-08-31 RX ORDER — NAPROXEN SODIUM 220 MG/1
81 TABLET, FILM COATED ORAL DAILY
Status: DISCONTINUED | OUTPATIENT
Start: 2023-09-01 | End: 2023-09-01 | Stop reason: HOSPADM

## 2023-08-31 RX ORDER — NETARSUDIL 0.2 MG/ML
1 SOLUTION/ DROPS OPHTHALMIC; TOPICAL NIGHTLY
COMMUNITY
End: 2023-12-19

## 2023-08-31 RX ORDER — INSULIN ASPART 100 [IU]/ML
0-5 INJECTION, SOLUTION INTRAVENOUS; SUBCUTANEOUS
Status: DISCONTINUED | OUTPATIENT
Start: 2023-08-31 | End: 2023-09-01

## 2023-08-31 RX ORDER — IBUPROFEN 200 MG
16 TABLET ORAL
Status: DISCONTINUED | OUTPATIENT
Start: 2023-08-31 | End: 2023-09-01 | Stop reason: HOSPADM

## 2023-08-31 RX ORDER — PROPYLENE GLYCOL 0.06 MG/ML
1 EMULSION OPHTHALMIC 2 TIMES DAILY
COMMUNITY

## 2023-08-31 RX ORDER — ATORVASTATIN CALCIUM 40 MG/1
40 TABLET, FILM COATED ORAL DAILY
COMMUNITY

## 2023-08-31 RX ORDER — SODIUM CHLORIDE 0.9 % (FLUSH) 0.9 %
10 SYRINGE (ML) INJECTION EVERY 12 HOURS PRN
Status: DISCONTINUED | OUTPATIENT
Start: 2023-08-31 | End: 2023-09-01 | Stop reason: HOSPADM

## 2023-08-31 RX ORDER — NALOXONE HCL 0.4 MG/ML
0.02 VIAL (ML) INJECTION
Status: DISCONTINUED | OUTPATIENT
Start: 2023-08-31 | End: 2023-09-01 | Stop reason: HOSPADM

## 2023-08-31 RX ORDER — POLYETHYLENE GLYCOL 3350 17 G/17G
17 POWDER, FOR SOLUTION ORAL DAILY
Status: DISCONTINUED | OUTPATIENT
Start: 2023-09-01 | End: 2023-09-01 | Stop reason: HOSPADM

## 2023-08-31 RX ORDER — POLYETHYLENE GLYCOL 3350 17 G/17G
17 POWDER, FOR SOLUTION ORAL DAILY
Status: DISCONTINUED | OUTPATIENT
Start: 2023-08-31 | End: 2023-08-31

## 2023-08-31 RX ORDER — AMLODIPINE BESYLATE 5 MG/1
5 TABLET ORAL DAILY
Status: DISCONTINUED | OUTPATIENT
Start: 2023-09-01 | End: 2023-09-01 | Stop reason: HOSPADM

## 2023-08-31 RX ORDER — FUROSEMIDE 10 MG/ML
40 INJECTION INTRAMUSCULAR; INTRAVENOUS
Status: COMPLETED | OUTPATIENT
Start: 2023-08-31 | End: 2023-08-31

## 2023-08-31 RX ORDER — ENOXAPARIN SODIUM 100 MG/ML
30 INJECTION SUBCUTANEOUS EVERY 24 HOURS
Status: DISCONTINUED | OUTPATIENT
Start: 2023-08-31 | End: 2023-08-31

## 2023-08-31 RX ORDER — VIT C/E/ZN/COPPR/LUTEIN/ZEAXAN 250MG-90MG
1 CAPSULE ORAL DAILY
COMMUNITY

## 2023-08-31 RX ORDER — ACETAMINOPHEN 325 MG/1
650 TABLET ORAL EVERY 4 HOURS PRN
Status: DISCONTINUED | OUTPATIENT
Start: 2023-08-31 | End: 2023-09-01 | Stop reason: HOSPADM

## 2023-08-31 RX ORDER — LACTULOSE 10 G/15ML
30 SOLUTION ORAL ONCE
Status: COMPLETED | OUTPATIENT
Start: 2023-08-31 | End: 2023-08-31

## 2023-08-31 RX ORDER — IPRATROPIUM BROMIDE AND ALBUTEROL SULFATE 2.5; .5 MG/3ML; MG/3ML
3 SOLUTION RESPIRATORY (INHALATION)
Status: COMPLETED | OUTPATIENT
Start: 2023-08-31 | End: 2023-08-31

## 2023-08-31 RX ORDER — ENOXAPARIN SODIUM 100 MG/ML
40 INJECTION SUBCUTANEOUS EVERY 12 HOURS
Status: DISCONTINUED | OUTPATIENT
Start: 2023-08-31 | End: 2023-09-01 | Stop reason: HOSPADM

## 2023-08-31 RX ORDER — CITALOPRAM 10 MG/1
10 TABLET ORAL DAILY
COMMUNITY
End: 2023-12-04 | Stop reason: DRUGHIGH

## 2023-08-31 RX ORDER — LOTEPREDNOL ETABONATE 5 MG/ML
1 SUSPENSION/ DROPS OPHTHALMIC 2 TIMES DAILY
COMMUNITY
End: 2023-12-19

## 2023-08-31 RX ORDER — BRIMONIDINE TARTRATE 2 MG/ML
1 SOLUTION/ DROPS OPHTHALMIC 2 TIMES DAILY
COMMUNITY
End: 2023-12-19

## 2023-08-31 RX ORDER — ATORVASTATIN CALCIUM 40 MG/1
40 TABLET, FILM COATED ORAL NIGHTLY
Status: DISCONTINUED | OUTPATIENT
Start: 2023-08-31 | End: 2023-09-01 | Stop reason: HOSPADM

## 2023-08-31 RX ORDER — TALC
6 POWDER (GRAM) TOPICAL NIGHTLY PRN
Status: DISCONTINUED | OUTPATIENT
Start: 2023-08-31 | End: 2023-09-01 | Stop reason: HOSPADM

## 2023-08-31 RX ORDER — ALOGLIPTIN 12.5 MG/1
1 TABLET, FILM COATED ORAL DAILY
COMMUNITY

## 2023-08-31 RX ORDER — DEXTROMETHORPHAN POLISTIREX 30 MG/5 ML
1 SUSPENSION, EXTENDED RELEASE 12 HR ORAL DAILY PRN
Status: DISCONTINUED | OUTPATIENT
Start: 2023-08-31 | End: 2023-09-01 | Stop reason: HOSPADM

## 2023-08-31 RX ORDER — PROPRANOLOL HYDROCHLORIDE 20 MG/1
20 TABLET ORAL DAILY
Status: DISCONTINUED | OUTPATIENT
Start: 2023-09-01 | End: 2023-09-01 | Stop reason: HOSPADM

## 2023-08-31 RX ORDER — IPRATROPIUM BROMIDE AND ALBUTEROL SULFATE 2.5; .5 MG/3ML; MG/3ML
3 SOLUTION RESPIRATORY (INHALATION) EVERY 4 HOURS PRN
Status: DISCONTINUED | OUTPATIENT
Start: 2023-08-31 | End: 2023-09-01 | Stop reason: HOSPADM

## 2023-08-31 RX ORDER — SIMETHICONE 80 MG
80 TABLET,CHEWABLE ORAL DAILY
COMMUNITY

## 2023-08-31 RX ORDER — AMOXICILLIN 250 MG
1 CAPSULE ORAL 2 TIMES DAILY
Status: DISCONTINUED | OUTPATIENT
Start: 2023-08-31 | End: 2023-09-01 | Stop reason: HOSPADM

## 2023-08-31 RX ADMIN — HUMAN ALBUMIN MICROSPHERES AND PERFLUTREN 0.11 MG: 10; .22 INJECTION, SOLUTION INTRAVENOUS at 02:08

## 2023-08-31 RX ADMIN — FUROSEMIDE 40 MG: 10 INJECTION, SOLUTION INTRAMUSCULAR; INTRAVENOUS at 10:08

## 2023-08-31 RX ADMIN — LACTULOSE 30 G: 20 SOLUTION ORAL at 05:08

## 2023-08-31 RX ADMIN — INSULIN ASPART 1 UNITS: 100 INJECTION, SOLUTION INTRAVENOUS; SUBCUTANEOUS at 08:08

## 2023-08-31 RX ADMIN — ENOXAPARIN SODIUM 40 MG: 40 INJECTION SUBCUTANEOUS at 08:08

## 2023-08-31 RX ADMIN — INSULIN DETEMIR 25 UNITS: 100 INJECTION, SOLUTION SUBCUTANEOUS at 08:08

## 2023-08-31 RX ADMIN — SENNOSIDES AND DOCUSATE SODIUM 1 TABLET: 50; 8.6 TABLET ORAL at 08:08

## 2023-08-31 RX ADMIN — Medication 6 MG: at 08:08

## 2023-08-31 RX ADMIN — IPRATROPIUM BROMIDE AND ALBUTEROL SULFATE 3 ML: .5; 3 SOLUTION RESPIRATORY (INHALATION) at 10:08

## 2023-08-31 RX ADMIN — ATORVASTATIN CALCIUM 40 MG: 40 TABLET, FILM COATED ORAL at 08:08

## 2023-08-31 NOTE — PROGRESS NOTES
Pharmacist Renal Dose Adjustment Note    Mono Bergman is a 78 y.o. male being treated with the medication enoxaparin.    Patient Data:    Vital Signs (Most Recent):  Temp: 98.5 °F (36.9 °C) (08/31/23 0827)  Pulse: 98 (08/31/23 1245)  Resp: (!) 23 (08/31/23 1245)  BP: (!) 148/77 (08/31/23 1245)  SpO2: 96 % (08/31/23 1301) Vital Signs (72h Range):  Temp:  [98.5 °F (36.9 °C)]   Pulse:  [84-98]   Resp:  [22-24]   BP: (110-158)/(67-89)   SpO2:  [94 %-97 %]      Recent Labs   Lab 08/31/23  0900   CREATININE 1.9*     Serum creatinine: 1.9 mg/dL (H) 08/31/23 0900  Estimated creatinine clearance: 42.1 mL/min (A)    Enoxaparin 30 mg every 24 hours was adjusted to 40 mg every 12 hours according to [facility]'s renal dose adjustment policy for BMI >40.     Pharmacist's Name:Peggy Ortiz PharmD 8/31/2023 2:05 PM  Pharmacist's Extension: [phone]

## 2023-08-31 NOTE — ASSESSMENT & PLAN NOTE
- no complaints of chest pain  - mildly elevated, remains flat, continue to trend   - EKG SR with PACs  - if trop trends up or has chest pain consider cardiology consultation

## 2023-08-31 NOTE — ASSESSMENT & PLAN NOTE
Patient's FSGs are controlled on current medication regimen.  Last A1c reviewed-   Lab Results   Component Value Date    HGBA1C 7.7 (H) 05/22/2023     current correctional scale  Low  Maintain anti-hyperglycemic dose as follows-   Antihyperglycemics (From admission, onward)    Start     Stop Route Frequency Ordered    08/31/23 2100  insulin detemir U-100 (Levemir) pen 25 Units         -- SubQ Nightly 08/31/23 1401    08/31/23 1459  insulin aspart U-100 pen 0-5 Units         -- SubQ Before meals & nightly PRN 08/31/23 1401        Hold Oral hypoglycemics while patient is in the hospital.    Follows with Diabetes NP as outpatient, uses dexcom for BG readings

## 2023-08-31 NOTE — H&P
"[place] - Emergency Dept.  [facility]  History & Physical    Patient Name: Mono Bergman  MRN: [id number]  Patient Class: OP- Observation  Admission Date: 8/31/2023  Attending Physician: Glo Dawn MD   Primary Care Provider: Hermelindo Singh MD         Patient information was obtained from patient, past medical records and ER records.     Subjective:     Principal Problem:Acute hypoxemic respiratory failure    Chief Complaint:   Chief Complaint   Patient presents with    Abdominal Pain        HPI: Mr. Bergman is a 78 year old male with a history of HTN, HLD, DM, hearing loss, CKD, JOEL on cpap who resides at [facility] and presents to ED with complains of left sided ab pain x 3 days.  He also complains of dyspnea and that he has had to wear his Cpap more than just at night "over the last 4 months".  Patient is very hard of hearing and is a poor historian. No family at bedside.  Patient also states that he "constantly as liquid stool", but he reports taking both colace and miralax daily.    Patient denies any CP, fever/chills.  In the ED, patient noted to by hypoxic on room air in the low 80's and was placed on 3 liters.  Lab work notable for creatinine 1.9, alk phos 184, , Trop 0.08>0.07.  COVID negative. Vqcan low probability for PE, chest xray without acute processes.   CT ab/pelvis only notable for moderate stool burden.  Patient will be admitted to observation for acute hypoxic respiratory failure, elevated troponin, ab pain, constipation.     Code Status Full  Surrogate Decision Maker Cecil      Past Medical History:   Diagnosis Date    Arthritis     Cataract     Colon polyps 10/29/2013    Diabetes mellitus type II     Diabetic retinopathy     GERD (gastroesophageal reflux disease) 07/18/2013    Hyperlipidemia LDL goal < 70 07/18/2013    Hypertension     Low BP    JOEL on CPAP     Sleep apnea, unspecified     Uveitis 05/11/2016       Past Surgical History: "   Procedure Laterality Date    CATARACT EXTRACTION W/  INTRAOCULAR LENS IMPLANT Left 12/10/14    CATARACT EXTRACTION W/  INTRAOCULAR LENS IMPLANT Right 07/13/2016    CHOLECYSTECTOMY      COLONOSCOPY N/A 2/3/2017    Procedure: COLONOSCOPY;  Surgeon: Natan Magdaleno MD;  [facility];  Service: Endoscopy;  Laterality: N/A;    EYE SURGERY      KNEE SURGERY  1971    left knee    rk rt. eye         Review of patient's allergies indicates:  No Known Allergies    No current facility-administered medications on file prior to encounter.     Current Outpatient Medications on File Prior to Encounter   Medication Sig    acetaZOLAMIDE (DIAMOX SEQUELS) 500 mg CpSR Take 1 capsule (500 mg total) by mouth 2 (two) times daily.    amLODIPine (NORVASC) 5 MG tablet     antiox #8/om3/dha/epa/lut/zeax (PRESERVISION AREDS 2, OMEGA-3, ORAL) Take 1 tablet by mouth once daily.    ARGININE, L-ARGININE, ORAL Take by mouth.    aspirin 81 mg Tab Take 81 mg by mouth before breakfast.    atorvastatin (LIPITOR) 80 MG tablet Take 40 mg by mouth once daily.    bisacodyL (DULCOLAX) 5 mg EC tablet bisacodyl 5 mg tablet,delayed release   Take 4 tablets by oral route as directed.    calcium carbonate (OS-JAMES) 500 mg calcium (1,250 mg) tablet Take 1 tablet (500 mg total) by mouth once daily.    cyanocobalamin (VITAMIN B-12) 1000 MCG tablet Take 100 mcg by mouth once daily.    docusate sodium (COLACE) 100 MG capsule Take 100 mg by mouth 2 (two) times daily.    dorzolamide-timolol 2-0.5% (COSOPT) 22.3-6.8 mg/mL ophthalmic solution Place 1 drop into the left eye 2 (two) times daily.    ibuprofen (ADVIL,MOTRIN) 200 MG tablet Take 400 mg by mouth daily as needed for Pain.    insulin aspart U-100 (NOVOLOG) 100 unit/mL (3 mL) InPn pen Inject 12 Units into the skin 3 (three) times daily with meals.    insulin glargine (LANTUS) 100 unit/mL injection Inject 50 Units into the skin every evening.    insulin needles, disposable,  "(SURE-FINE PEN NEEDLES) 31 x 3/16 " Ndle Use twice daily to inject insulin    L.acidophilus-B.bifidum&longum 150 mg (3 billion cell) Chew Take 1 tablet by mouth.    metoclopramide HCl (REGLAN) 10 MG tablet Take 10 mg by mouth once daily.    midodrine (PROAMATINE) 5 MG Tab Take 1 tablet (5 mg total) by mouth every 12 (twelve) hours.    ondansetron (ZOFRAN-ODT) 4 MG TbDL Take 1 tablet (4 mg total) by mouth every 8 (eight) hours as needed (Nausea).    pantoprazole (PROTONIX) 40 MG tablet Take 1 tablet (40 mg total) by mouth once daily.    pen needle, diabetic 31 gauge x 3/16" Ndle     polyethylene glycol (GLYCOLAX) 17 gram/dose powder polyethylene glycol 3350 17 gram/dose oral powder    propranolol HCl (PROPRANOLOL ORAL) Take 20 mg by mouth once daily.    simethicone (MYLICON) 80 MG chewable tablet simethicone 80 mg chewable tablet   Take by oral route.     Family History       Problem Relation (Age of Onset)    Heart disease Mother    Thyroid cancer Maternal Grandmother          Tobacco Use    Smoking status: Never     Passive exposure: Never    Smokeless tobacco: Never   Substance and Sexual Activity    Alcohol use: No    Drug use: No    Sexual activity: Not Currently     Review of Systems   Respiratory:  Positive for shortness of breath.    Gastrointestinal:  Positive for abdominal pain and diarrhea.     Objective:     Vital Signs (Most Recent):  Temp: 98.5 °F (36.9 °C) (08/31/23 0827)  Pulse: 98 (08/31/23 1301)  Resp: (!) 23 (08/31/23 1245)  BP: (!) 148/77 (08/31/23 1301)  SpO2: 96 % (08/31/23 1301) Vital Signs (24h Range):  Temp:  [98.5 °F (36.9 °C)] 98.5 °F (36.9 °C)  Pulse:  [84-98] 98  Resp:  [22-24] 23  SpO2:  [94 %-97 %] 96 %  BP: (110-158)/(67-89) 148/77     Weight: 122 kg (269 lb)  Body mass index is 40.9 kg/m².     Physical Exam  Vitals and nursing note reviewed.   Constitutional:       Appearance: He is obese.      Comments: Ill appearing   HENT:      Ears:      Comments: Hard of hearing "   Cardiovascular:      Rate and Rhythm: Regular rhythm. Tachycardia present.      Pulses: Normal pulses.      Heart sounds: Normal heart sounds.   Pulmonary:      Comments: Tachypnea, diminished breath sounds all lobes, on 3 liters nasal cannula,no wheezing   Abdominal:      Comments: Large obese ab, + BS, non-distended, mildly tender LUQ   Musculoskeletal:         General: No swelling. Normal range of motion.   Skin:     General: Skin is warm and dry.   Neurological:      Mental Status: He is alert and oriented to person, place, and time.           Significant Labs: All pertinent labs within the past 24 hours have been reviewed.    Significant Imaging: I have reviewed all pertinent imaging results/findings within the past 24 hours.    Assessment/Plan:     * Acute hypoxemic respiratory failure  Patient with Hypoxic Respiratory failure which is Acute.  he is not on home oxygen. Supplemental oxygen was provided and noted- 3 liters  .   Signs/symptoms of respiratory failure include- tachypnea and increased work of breathing. Contributing diagnoses includes - CHF and Interstitial lung disease Labs and images were reviewed. Patient Has recent ABG, which has been reviewed. Will treat underlying causes and adjust management of respiratory failure as follows-   - unclear etiology  - non-smoker, no hx of ILD, COPD/asthma, not followed by pulmonology   - Covid negative, check flu  - has had to wear home Cpap during the day over the last 4 months d/t worsening dyspnea per patient  - Vq scan with low probability PE  - checking ECHO and CT non-contrast chest (d/t renal function)  - continue supplementation o2 and duo nebs as needed    Elevated troponin  - no complaints of chest pain  - mildly elevated, remains flat, continue to trend   - EKG SR with PACs  - if trop trends up or has chest pain consider cardiology consultation       Constipation  - CT showed moderate stool burden  - patient takes stool softeners and mirilax at  "home, states his stools are usually "runny"  - constipation may be causing his ab pain, will start aggressive stool regimen       ELIESER (acute kidney injury)  Acute on chronic kidney disease  - appears at baseline, around 1.8-1.9   - continue to avoid nephrotoxic agents and renal dose meds as able   - monitor intermittently     JOEL on CPAP  - continue nightly       Type 2 diabetes mellitus with hemoglobin A1c goal of less than 7.0%  Patient's FSGs are controlled on current medication regimen.  Last A1c reviewed-   Lab Results   Component Value Date    HGBA1C 7.7 (H) 05/22/2023     current correctional scale  Low  Maintain anti-hyperglycemic dose as follows-   Antihyperglycemics (From admission, onward)    Start     Stop Route Frequency Ordered    08/31/23 2100  insulin detemir U-100 (Levemir) pen 25 Units         -- SubQ Nightly 08/31/23 1401    08/31/23 1459  insulin aspart U-100 pen 0-5 Units         -- SubQ Before meals & nightly PRN 08/31/23 1401        Hold Oral hypoglycemics while patient is in the hospital.    Follows with Diabetes NP as outpatient, uses dexcom for BG readings    Hyperlipidemia associated with type 2 diabetes mellitus  - continue home statin nightly     Hypertension associated with diabetes  - bp controled, continue home Norvasc daily       VTE Risk Mitigation (From admission, onward)         Ordered     enoxaparin injection 40 mg  Every 12 hours         08/31/23 1404     IP VTE HIGH RISK PATIENT  Once         08/31/23 1401     Place sequential compression device  Until discontinued         08/31/23 1401                     On 08/31/2023, patient should be placed in hospital observation services under my care in collaboration with Dr. Dawn.      Argelia Crockett NP  Department of Hospital Medicine  '[place] - Emergency Dept.  "

## 2023-08-31 NOTE — ED PROVIDER NOTES
KSCRIBE #1 NOTE: I, Jannie Ahn, am scribing for, and in the presence of, Lizzy Roldan MD. I have scribed the entire note.      History      Chief Complaint   Patient presents with    Abdominal Pain       Review of patient's allergies indicates:  No Known Allergies     HPI   HPI    8/31/2023, 8:37 AM   History obtained from the patient      History of Present Illness: Mono Bergman is a 78 y.o. male patient with a PMHx of DM2, HLD, HTN, and JOEL on CPAP who presents to the Emergency Department for L-sided abdominal pain which onset at 0300 this morning. Symptoms are constant and moderate in severity. Pt's pain worsens when he breaths and moves. No associated sxs reported. Patient denies any fever, chills, difficulty urinating, N/V/D, CP, SOB, and all other sxs at this time. No prior Tx reported. Pt denies having a history of kidney stones. No further complaints or concerns at this time.         Arrival mode: EMS    PCP: Hermelindo Singh MD       Past Medical History:  Past Medical History:   Diagnosis Date    Arthritis     Cataract     Colon polyps 10/29/2013    Diabetes mellitus type II     Diabetic retinopathy     GERD (gastroesophageal reflux disease) 07/18/2013    Hyperlipidemia LDL goal < 70 07/18/2013    Hypertension     Low BP    JOEL on CPAP     Sleep apnea, unspecified     Uveitis 05/11/2016       Past Surgical History:  Past Surgical History:   Procedure Laterality Date    CATARACT EXTRACTION W/  INTRAOCULAR LENS IMPLANT Left 12/10/14    CATARACT EXTRACTION W/  INTRAOCULAR LENS IMPLANT Right 07/13/2016    CHOLECYSTECTOMY      COLONOSCOPY N/A 2/3/2017    Procedure: COLONOSCOPY;  Surgeon: Natan Magdaleno MD;  Location: [facility];  Service: Endoscopy;  Laterality: N/A;    EYE SURGERY      KNEE SURGERY  1971    left knee    rk rt. eye           Family History:  Family History   Problem Relation Age of Onset    Heart disease Mother     Thyroid cancer Maternal Grandmother     Colon cancer  Neg Hx        Social History:  Social History     Tobacco Use    Smoking status: Never     Passive exposure: Never    Smokeless tobacco: Never   Substance and Sexual Activity    Alcohol use: No    Drug use: No    Sexual activity: Not Currently       ROS   Review of Systems   Constitutional:  Negative for chills and fever.   HENT:  Negative for sore throat.    Respiratory:  Negative for shortness of breath.    Cardiovascular:  Negative for chest pain.   Gastrointestinal:  Positive for abdominal pain (L-sided). Negative for diarrhea, nausea and vomiting.   Genitourinary:  Negative for difficulty urinating and dysuria.   Musculoskeletal:  Negative for back pain.   Skin:  Negative for rash.   Neurological:  Negative for weakness.   Hematological:  Does not bruise/bleed easily.   All other systems reviewed and are negative.      Physical Exam      Initial Vitals [08/31/23 0827]   BP Pulse Resp Temp SpO2   110/70 84 (!) 23 98.5 °F (36.9 °C) 96 %      MAP       --          Physical Exam  Nursing Notes and Vital Signs Reviewed.  Constitutional: Patient is in no acute distress. Obese.  Head: Atraumatic. Normocephalic.  Eyes: PERRL. EOM intact. Conjunctivae are not pale. No scleral icterus.  ENT: Mucous membranes are moist. Oropharynx is clear and symmetric.    Neck: Supple. Full ROM. No lymphadenopathy.  Cardiovascular: Regular rate. Regular rhythm. No murmurs, rubs, or gallops. Distal pulses are 2+ and symmetric.  Pulmonary/Chest: No respiratory distress. Clear to auscultation bilaterally. No wheezing or rales.  Abdominal: Soft and non-distended.  There is no tenderness.  No rebound, guarding, or rigidity.  Musculoskeletal: Moves all extremities. No obvious deformities. No edema.  Skin: Warm and dry.  Neurological:  Alert, awake, and appropriate.  Normal speech.  No acute focal neurological deficits are appreciated.  Psychiatric: Normal affect. Good eye contact. Appropriate in content.    ED Course    Critical  "Care    Date/Time: 8/31/2023 3:36 PM    Performed by: Lizzy Roldan MD  Authorized by: Glo Dawn MD  Direct patient critical care time: 25 minutes  Additional history critical care time: 8 minutes  Ordering / reviewing critical care time: 8 minutes  Documentation critical care time: 6 minutes  Consulting other physicians critical care time: 5 minutes  Total critical care time (exclusive of procedural time) : 52 minutes  Critical care was necessary to treat or prevent imminent or life-threatening deterioration of the following conditions: cardiac failure and respiratory failure.  Critical care was time spent personally by me on the following activities: blood draw for specimens, development of treatment plan with patient or surrogate, discussions with consultants, interpretation of cardiac output measurements, evaluation of patient's response to treatment, examination of patient, obtaining history from patient or surrogate, ordering and performing treatments and interventions, ordering and review of laboratory studies, ordering and review of radiographic studies, pulse oximetry and re-evaluation of patient's condition.        ED Vital Signs:  Vitals:    08/31/23 0827 08/31/23 0912 08/31/23 1026 08/31/23 1030   BP: 110/70 126/68 134/89 134/89   Pulse: 84 84 90 88   Resp: (!) 23 (!) 22 (!) 22 (!) 24   Temp: 98.5 °F (36.9 °C)      TempSrc: Oral      SpO2: 96% (!) 94% 95% 97%   Weight:       Height: 5' 8" (1.727 m)       08/31/23 1035 08/31/23 1100 08/31/23 1130 08/31/23 1245   BP:   (!) 158/67 (!) 148/77   Pulse:  93 94 98   Resp:   (!) 24 (!) 23   Temp:       TempSrc:       SpO2:   96% (!) 94%   Weight: 122.1 kg (269 lb 3.2 oz)      Height:        08/31/23 1301 08/31/23 1400 08/31/23 1506   BP: (!) 148/77 (!) 169/75 (!) 162/77   Pulse: 98 95 93   Resp:  (!) 22 16   Temp:   97.7 °F (36.5 °C)   TempSrc:   Oral   SpO2: 96% (!) 94% 96%   Weight: 122 kg (269 lb)     Height: 5' 8" (1.727 m)         Abnormal Lab " Results:  Labs Reviewed   CBC W/ AUTO DIFFERENTIAL - Abnormal; Notable for the following components:       Result Value    RBC 3.79 (*)     Hemoglobin 11.3 (*)     Hematocrit 34.0 (*)     Lymph % 17.7 (*)     All other components within normal limits   COMPREHENSIVE METABOLIC PANEL - Abnormal; Notable for the following components:    Glucose 313 (*)     Creatinine 1.9 (*)     Calcium 8.5 (*)     Alkaline Phosphatase 184 (*)     eGFR 36 (*)     All other components within normal limits   B-TYPE NATRIURETIC PEPTIDE - Abnormal; Notable for the following components:     (*)     All other components within normal limits   TROPONIN I - Abnormal; Notable for the following components:    Troponin I 0.083 (*)     All other components within normal limits   TROPONIN I - Abnormal; Notable for the following components:    Troponin I 0.075 (*)     All other components within normal limits   ISTAT PROCEDURE - Abnormal; Notable for the following components:    POC PO2 77 (*)     All other components within normal limits   POCT INFLUENZA A/B MOLECULAR - Normal   LIPASE   HIV 1 / 2 ANTIBODY    Narrative:     Release to patient->Immediate   HEPATITIS C ANTIBODY    Narrative:     Release to patient->Immediate   HEP C VIRUS HOLD SPECIMEN    Narrative:     Release to patient->Immediate   B-TYPE NATRIURETIC PEPTIDE   TROPONIN I   SARS-COV-2 RNA AMPLIFICATION, QUAL   URINALYSIS, REFLEX TO URINE CULTURE   TROPONIN I   HEMOGLOBIN A1C        All Lab Results:  Results for orders placed or performed during the hospital encounter of 08/31/23   CBC W/ AUTO DIFFERENTIAL   Result Value Ref Range    WBC 8.52 3.90 - 12.70 K/uL    RBC 3.79 (L) 4.60 - 6.20 M/uL    Hemoglobin 11.3 (L) 14.0 - 18.0 g/dL    Hematocrit 34.0 (L) 40.0 - 54.0 %    MCV 90 82 - 98 fL    MCH 29.8 27.0 - 31.0 pg    MCHC 33.2 32.0 - 36.0 g/dL    RDW 13.0 11.5 - 14.5 %    Platelets 199 150 - 450 K/uL    MPV 9.5 9.2 - 12.9 fL    Immature Granulocytes 0.4 0.0 - 0.5 %    Gran #  (ANC) 5.8 1.8 - 7.7 K/uL    Immature Grans (Abs) 0.03 0.00 - 0.04 K/uL    Lymph # 1.5 1.0 - 4.8 K/uL    Mono # 0.8 0.3 - 1.0 K/uL    Eos # 0.4 0.0 - 0.5 K/uL    Baso # 0.06 0.00 - 0.20 K/uL    nRBC 0 0 /100 WBC    Gran % 67.5 38.0 - 73.0 %    Lymph % 17.7 (L) 18.0 - 48.0 %    Mono % 8.9 4.0 - 15.0 %    Eosinophil % 4.8 0.0 - 8.0 %    Basophil % 0.7 0.0 - 1.9 %    Differential Method Automated    Comp. Metabolic Panel   Result Value Ref Range    Sodium 139 136 - 145 mmol/L    Potassium 4.4 3.5 - 5.1 mmol/L    Chloride 106 95 - 110 mmol/L    CO2 24 23 - 29 mmol/L    Glucose 313 (H) 70 - 110 mg/dL    BUN 16 8 - 23 mg/dL    Creatinine 1.9 (H) 0.5 - 1.4 mg/dL    Calcium 8.5 (L) 8.7 - 10.5 mg/dL    Total Protein 7.1 6.0 - 8.4 g/dL    Albumin 3.5 3.5 - 5.2 g/dL    Total Bilirubin 0.6 0.1 - 1.0 mg/dL    Alkaline Phosphatase 184 (H) 55 - 135 U/L    AST 33 10 - 40 U/L    ALT 36 10 - 44 U/L    eGFR 36 (A) >60 mL/min/1.73 m^2    Anion Gap 9 8 - 16 mmol/L   Lipase   Result Value Ref Range    Lipase 10 4 - 60 U/L   HIV 1/2 Ag/Ab (4th Gen)   Result Value Ref Range    HIV 1/2 Ag/Ab Negative Negative   Hepatitis C Antibody   Result Value Ref Range    Hepatitis C Ab Negative Negative   HCV Virus Hold Specimen   Result Value Ref Range    HEP C Virus Hold Specimen Hold for HCV sendout    BNP   Result Value Ref Range     (H) 0 - 99 pg/mL   Troponin I   Result Value Ref Range    Troponin I 0.083 (H) 0.000 - 0.026 ng/mL   COVID-19 Rapid Screening   Result Value Ref Range    SARS-CoV-2 RNA, Amplification, Qual Negative Negative   Troponin I   Result Value Ref Range    Troponin I 0.075 (H) 0.000 - 0.026 ng/mL   POCT Influenza A/B Molecular   Result Value Ref Range    POC Molecular Influenza A Ag Negative Negative, Not Reported    POC Molecular Influenza B Ag Negative Negative, Not Reported     Acceptable Yes    Echo   Result Value Ref Range    BSA 2.42 m2    IVRT 49.48 msec    TDI LATERAL 0.07 m/s    Left Ventricular  Outflow Tract Mean Gradient 3.92 mmHg    Left Ventricular Outflow Tract Mean Velocity 0.99 cm/s    MV peak gradient 8 mmHg    MV mean gradient 3 mmHg    MV stenosis pressure 1/2 time 60.07 ms    MV VTI 30.2 cm    TR Max Alejandro 1.67 m/s    Ao root annulus 3.34 cm    Ao peak alejandro 1.34 m/s    Posterior Wall 1.53 (A) 0.6 - 1.1 cm    LVOT diameter 2.09 cm    LVOT peak alejandro 1.10 m/s    LVOT peak VTI 21.40 cm    E wave deceleration time 207.15 msec    MV Peak A Alejandro 1.20 m/s    MV Peak E Alejandro 0.92 m/s    RA Major Axis 3.40 cm    PV mean gradient 3 mmHg    RVOT peak alejandro 0.96 m/s    RVOT peak VTI 16.9 cm    IVS 1.64 (A) 0.6 - 1.1 cm    Ao VTI 24.90 cm    AV mean gradient 5 mmHg    LVIDd 4.12 3.5 - 6.0 cm    LVIDs 2.60 2.1 - 4.0 cm    Left Atrium Major Axis 4.49 cm    Left Atrium Minor Axis 4.77 cm    STJ 3.88 cm    Ascending aorta 3.31 cm    LV Systolic Volume 24.49 mL    LV Diastolic Volume 74.91 mL    TDI SEPTAL 0.06 m/s    LA size 3.44 cm    IVC diameter 1.80 cm    LV LATERAL E/E' RATIO 13.14 m/s    LV SEPTAL E/E' RATIO 15.33 m/s    FS 37 28 - 44 %    LV mass 264.75 g    ZLVIDD -8.10     ZLVIDS -6.03     Left Ventricle Relative Wall Thickness 0.74 cm    AV valve area 2.95 cm²    AV Velocity Ratio 0.82     AV index (prosthetic) 0.86     MV valve area p 1/2 method 3.66 cm2    MV valve area by continuity eq 2.43 cm2    E/A ratio 0.77     Mean e' 0.07 m/s    LVOT area 3.4 cm2    LVOT stroke volume 73.38 cm3    AV peak gradient 7 mmHg    E/E' ratio 14.15 m/s    LV Systolic Volume Index 10.6 mL/m2    LV Diastolic Volume Index 32.29 mL/m2    LV Mass Index 114 g/m2    Triscuspid Valve Regurgitation Peak Gradient 11 mmHg    RANJIT by Velocity Ratio 2.81 cm²    LA Volume Index 19.2 mL/m2    LA volume 44.63 cm3    LA WIDTH 3.3 cm    TAPSE 2.30 cm    RA Width 2.0 cm    TV resting pulmonary artery pressure 14 mmHg    RV TB RVSP 5 mmHg    Est. RA pres 3 mmHg    EF 65 %   ISTAT PROCEDURE   Result Value Ref Range    POC PH 7.414 7.35 - 7.45    POC  PCO2 38.0 35 - 45 mmHg    POC PO2 77 (L) 80 - 100 mmHg    POC HCO3 24.3 24 - 28 mmol/L    POC BE 0 -2 to 2 mmol/L    POC SATURATED O2 96 95 - 100 %    Sample ARTERIAL     Site RR     Allens Test Pass     DelSys Nasal Can     Mode SPONT     Flow 2     FiO2 28          Imaging Results:  Imaging Results              CT Chest Without Contrast (Final result)  Result time 08/31/23 15:16:17      Final result by Roberto Jha MD (08/31/23 15:16:17)                   Impression:      No acute findings.  Chronic pleuroparenchymal scarring in the right upper lobe and bilateral lung bases.    All CT scans at this facility are performed  using dose modulation techniques as appropriate to performed exam including the following:  automated exposure control; adjustment of mA and/or kV according to the patients size (this includes techniques or standardized protocols for targeted exams where dose is matched to indication/reason for exam: i.e. extremities or head);  iterative reconstruction technique.      Electronically signed by: Roberto Jha MD  Date:    08/31/2023  Time:    15:16               Narrative:    EXAMINATION:  CT CHEST WITHOUT CONTRAST    CLINICAL HISTORY:  acute hypoxic respiratory failure;    TECHNIQUE:  Axial CT images performed through the chest without intravenous contrast.    COMPARISON:  None    FINDINGS:  Small pericardial effusion.  The heart size is normal.  Severe coronary artery calcifications.  Aortic atherosclerosis.  Elevation of the right hemidiaphragm.  Associated compressive and/or scarring right basilar atelectasis.  Mild bandlike scarring and bronchiectasis in the posterior aspects of the right upper lobe.  Mild pleural thickening in this region.  No acute infiltrates.  No effusions.  No suspicious pulmonary nodules.  No thoracic adenopathy.    No pneumothorax.    The upper abdominal visualized organs are within normal limits.    The bones are intact.                                       NM Lung  Scan Ventilation Perfusion (Final result)  Result time 08/31/23 13:05:41      Final result by Herman Newton MD (08/31/23 13:05:41)                   Impression:      This represents a low probability  of pulmonary embolism.      Electronically signed by: Herman Newton MD  Date:    08/31/2023  Time:    13:05               Narrative:    EXAMINATION:  NM LUNG VENTILATION AND PERFUSION IMAGING    CLINICAL HISTORY:  Pulmonary embolism (PE) suspected, high prob;    TECHNIQUE:  One mCi of Tc-99m-DTPA were placed in the nebulizer. Following the inhalation Tc-99m-DTPA in aerosol and the subsequent IV administration of 5.2 mCi of Tc-99m-MAA, multiple images of the thorax were obtained in various projections.    COMPARISON:  08/31/2023    FINDINGS:  On the perfusion study, there are no perfusion abnormalities.  The ventilation study reveals no ventilatory abnormalities.  The CXR demonstrates low lung volumes.                                       X-Ray Chest AP Portable (Final result)  Result time 08/31/23 10:29:35      Final result by Dima Cuellar III, MD (08/31/23 10:29:35)                   Impression:      No acute cardiopulmonary disease      Electronically signed by: oMno Cuellar  Date:    08/31/2023  Time:    10:29               Narrative:    EXAMINATION:  XR CHEST AP PORTABLE    CLINICAL HISTORY:  Shortness of breath    TECHNIQUE:  Single frontal view of the chest was performed.    COMPARISON:  02/25/2021    FINDINGS:  Elevation of the right hemidiaphragm.  The cardiac silhouette and mediastinum are unremarkable.  No pneumothorax, pleural effusion or acute infiltrate.                                       CT Renal Stone Study ABD Pelvis WO (Final result)  Result time 08/31/23 09:38:14      Final result by Dima Cuellar III, MD (08/31/23 09:38:14)                   Impression:      No renal calculi or hydronephrosis.    Developing small pericardial effusion.    Moderate stool  burden.    Additional findings as above      Electronically signed by: Mono Garnettmery  Date:    08/31/2023  Time:    09:38               Narrative:    EXAMINATION:  CT RENAL STONE STUDY ABD PELVIS WO    CLINICAL HISTORY:  Flank pain, kidney stone suspected;    TECHNIQUE:  Low dose axial images, sagittal and coronal reformations were obtained from the lung bases to the pubic symphysis.  Contrast was not administered.    COMPARISON:  CT abdomen and pelvis 05/02/2017    FINDINGS:  No renal calculi or hydronephrosis. Bilateral perinephric stranding, unchanged.  No visualized solid renal mass. The adrenal glands are unremarkable.    Developing small pericardial effusion. There is coronary artery calcification.  Bilateral gynecomastia.  Mild scarring or atelectasis at the lung bases.    Status post cholecystectomy.  The liver and spleen are unremarkable.  Fatty replacement of the pancreas.    Unchanged elevation of the right hemidiaphragm.  Moderate stool burden.  The appendix is normal.  No bowel obstruction.    No aortic aneurysm, free air, free fluid or adenopathy.  Mild prostate enlargement.  The bladder is unremarkable.  No diverticulosis.  Degenerative changes within the lumbar spine.                                     The EKG was ordered, reviewed, and independently interpreted by the ED provider.  Interpretation time: 10:36  Rate: 90 BPM  Rhythm: normal sinus rhythm  Interpretation: Left axis deviation. No STEMI.           The Emergency Provider reviewed the vital signs and test results, which are outlined above.    ED Discussion     1:13 PM: Discussed case with Dr. Dawn ([facility]). Dr. Dawn agrees with current care and management of pt and accepts admission.   Admitting Service: [facility]  Admitting Physician: Dr. Dawn  Admit to: Obs    1:15 PM: Re-evaluated pt. I have discussed test results, shared treatment plan, and the need for admission with patient and family at bedside. Pt and  family express understanding at this time and agree with all information. All questions answered. Pt and family have no further questions or concerns at this time. Pt is ready for admit.           ED Medication(s):  Medications   amLODIPine tablet 5 mg (has no administration in time range)   aspirin chewable tablet 81 mg (has no administration in time range)   atorvastatin tablet 40 mg (has no administration in time range)   insulin detemir U-100 (Levemir) pen 25 Units (has no administration in time range)   propranoloL tablet 20 mg (has no administration in time range)   sodium chloride 0.9% flush 10 mL (has no administration in time range)   naloxone 0.4 mg/mL injection 0.02 mg (has no administration in time range)   glucose chewable tablet 16 g (has no administration in time range)   glucose chewable tablet 24 g (has no administration in time range)   glucagon (human recombinant) injection 1 mg (has no administration in time range)   acetaminophen tablet 650 mg (has no administration in time range)   senna-docusate 8.6-50 mg per tablet 1 tablet (has no administration in time range)   mineral oil enema 1 enema (has no administration in time range)   ondansetron injection 4 mg (has no administration in time range)   insulin aspart U-100 pen 0-5 Units (has no administration in time range)   melatonin tablet 6 mg (has no administration in time range)   albuterol-ipratropium 2.5 mg-0.5 mg/3 mL nebulizer solution 3 mL (has no administration in time range)   dextrose 10% bolus 125 mL 125 mL (has no administration in time range)   dextrose 10% bolus 250 mL 250 mL (has no administration in time range)   enoxaparin injection 40 mg (has no administration in time range)   lactulose 20 gram/30 mL solution Soln 30 g (has no administration in time range)   polyethylene glycol packet 17 g (has no administration in time range)   albuterol-ipratropium 2.5 mg-0.5 mg/3 mL nebulizer solution 3 mL (3 mLs Nebulization Given 8/31/23  1030)   furosemide injection 40 mg (40 mg Intravenous Given 8/31/23 1035)   perflutren protein-A microsphr 0.22 mg/mL IV susp (0.11 mg Intravenous Given 8/31/23 1423)           New Prescriptions    No medications on file         Medical Decision Making    Medical Decision Making    1. Kidney Stone  2. Diverticulitis  3. Pneumonia  4. ACS  5. PE    79 y/o male hx of diabetes, morbid obesity, CKD, JOEL wears CPAP at night, chronic htn, presented to the ER c/o left mid to upper abd pain, shortness of breath while in the ER, not on home oxygen but oxygen level was noted to be in mid 80s, currently on 3 liters NC, given lasix and duoneb in the ER, CXR otherwise normal, V/Q scan negative for PE, troponin and BNP are mildly elevated so unclear if he's had a cardiac event causing his symptoms and hypoxemia, but will need admission and further evaluation.     Amount and/or Complexity of Data Reviewed  Labs: ordered. Decision-making details documented in ED Course.  Radiology: ordered. Decision-making details documented in ED Course.  ECG/medicine tests: ordered. Decision-making details documented in ED Course.    Risk  Prescription drug management.                Scribe Attestation:   Scribe #1: I performed the above scribed service and the documentation accurately describes the services I performed. I attest to the accuracy of the note.    Attending:   Physician Attestation Statement for Scribe #1: I, Lizzy Roldan MD, personally performed the services described in this documentation, as scribed by Jannie Ahn, in my presence, and it is both accurate and complete.          Clinical Impression       ICD-10-CM ICD-9-CM   1. Acute hypoxemic respiratory failure  J96.01 518.81   2. Shortness of breath  R06.02 786.05   3. Morbid obesity  E66.01 278.01   4. Left upper quadrant abdominal pain  R10.12 789.02   5. Heart failure  I50.9 428.9   6. Chest pain  R07.9 786.50       Disposition:   Disposition: Placed in  Observation  Condition: Lizzy Hsu MD  08/31/23 1530

## 2023-08-31 NOTE — ASSESSMENT & PLAN NOTE
Patient with Hypoxic Respiratory failure which is Acute.  he is not on home oxygen. Supplemental oxygen was provided and noted- 3 liters  .   Signs/symptoms of respiratory failure include- tachypnea and increased work of breathing. Contributing diagnoses includes - CHF and Interstitial lung disease Labs and images were reviewed. Patient Has recent ABG, which has been reviewed. Will treat underlying causes and adjust management of respiratory failure as follows-   - unclear etiology  - non-smoker, no hx of ILD, COPD/asthma, not followed by pulmonology   - Covid negative, check flu  - has had to wear home Cpap during the day over the last 4 months d/t worsening dyspnea per patient  - Vq scan with low probability PE  - checking ECHO and CT non-contrast chest (d/t renal function)  - continue supplementation o2 and duo nebs as needed

## 2023-08-31 NOTE — ASSESSMENT & PLAN NOTE
Acute on chronic kidney disease  - appears at baseline, around 1.8-1.9   - continue to avoid nephrotoxic agents and renal dose meds as able   - monitor intermittently

## 2023-08-31 NOTE — PLAN OF CARE
Problem: Adult Inpatient Plan of Care  Goal: Plan of Care Review  Outcome: Ongoing, Progressing  Goal: Patient-Specific Goal (Individualized)  Outcome: Ongoing, Progressing  Goal: Absence of Hospital-Acquired Illness or Injury  Outcome: Ongoing, Progressing  Goal: Optimal Comfort and Wellbeing  Outcome: Ongoing, Progressing  Goal: Readiness for Transition of Care  Outcome: Ongoing, Progressing     Problem: Bariatric Environmental Safety  Goal: Safety Maintained with Care  Outcome: Ongoing, Progressing     Problem: Fluid and Electrolyte Imbalance (Acute Kidney Injury/Impairment)  Goal: Fluid and Electrolyte Balance  Outcome: Ongoing, Progressing     Problem: Oral Intake Inadequate (Acute Kidney Injury/Impairment)  Goal: Optimal Nutrition Intake  Outcome: Ongoing, Progressing     Problem: Renal Function Impairment (Acute Kidney Injury/Impairment)  Goal: Effective Renal Function  Outcome: Ongoing, Progressing     Problem: Diabetes Comorbidity  Goal: Blood Glucose Level Within Targeted Range  Outcome: Ongoing, Progressing     Problem: Fall Injury Risk  Goal: Absence of Fall and Fall-Related Injury  Outcome: Ongoing, Progressing

## 2023-08-31 NOTE — HPI
"Mr. Bergman is a 78 year old male with a history of HTN, HLD, DM, hearing loss, CKD, JOEL on cpap who resides at [facility] and presents to ED with complains of left sided ab pain x 3 days.  He also complains of dyspnea and that he has had to wear his Cpap more than just at night "over the last 4 months".  Patient is very hard of hearing and is a poor historian. No family at bedside.  Patient also states that he "constantly as liquid stool", but he reports taking both colace and miralax daily.    Patient denies any CP, fever/chills.  In the ED, patient noted to by hypoxic on room air in the low 80's and was placed on 3 liters.  Lab work notable for creatinine 1.9, alk phos 184, , Trop 0.08>0.07.  COVID negative. Vqcan low probability for PE, chest xray without acute processes.   CT ab/pelvis only notable for moderate stool burden.  Patient will be admitted to observation for acute hypoxic respiratory failure, elevated troponin, ab pain, constipation.     Code Status Full  Surrogate Decision Maker Cecil  "

## 2023-08-31 NOTE — ED NOTES
Patient in ED c/o abdominal 8/10 abdominal pain.  Patient is resident at [facility].  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

## 2023-08-31 NOTE — ED NOTES
Patient in ED c/o abdominal 8/10 abdominal pain.  Patient is resident at [facility].  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

## 2023-08-31 NOTE — PHARMACY MED REC
"Admission Medication History     The home medication history was taken by Karen Noguera.    You may go to "Admission" then "Reconcile Home Medications" tabs to review and/or act upon these items.     The home medication list has been updated by the Pharmacy department.   Please read ALL comments highlighted in yellow.   Please address this information as you see fit.    Feel free to contact us if you have any questions or require assistance.      The medications listed below were removed from the home medication list. Please reorder if appropriate:  Patient reports no longer taking the following medication(s):  DIAMOX SEQUELS 500MG  ARGININE   DULCOLAX 5MG  OS JAMES 500MG  ADVIL 200MG  PROAMATINE 5MG  GLYCOLAX 17GRAM  PROPRANOLOL 20 MG    Medications listed below were obtained from: [employer]- [employer] and SNF/Rehab/LTAC   (Not in a hospital admission)        Karenfaith Mcleodson  [id number]    Current Outpatient Medications on File Prior to Encounter   Medication Sig Dispense Refill Last Dose    alogliptin (NESINA) 12.5 mg Tab Take 1 tablet by mouth once daily.   8/31/2023    amLODIPine (NORVASC) 5 MG tablet Take 5 mg by mouth once daily.   8/31/2023    aspirin 81 mg Tab Take 81 mg by mouth before breakfast.   8/31/2023    atorvastatin (LIPITOR) 40 MG tablet Take 40 mg by mouth once daily.   8/30/2023    brimonidine 0.2% (ALPHAGAN) 0.2 % Drop Apply 1 drop to eye 2 (two) times a day.   8/31/2023    calcium carbonate (OS-JAMES) 500 mg calcium (1,250 mg) tablet Take 1 tablet (500 mg total) by mouth once daily. 90 tablet 3 8/31/2023    citalopram (CELEXA) 10 MG tablet Take 10 mg by mouth once daily.   8/31/2023    cyanocobalamin (VITAMIN B-12) 1000 MCG tablet Take 100 mcg by mouth once daily.   8/31/2023    docusate sodium (COLACE) 100 MG capsule Take 100 mg by mouth 2 (two) times daily.   8/31/2023    dorzolamide-timolol 2-0.5% (COSOPT) 22.3-6.8 mg/mL ophthalmic solution Place 1 drop into the left eye 2 (two) times " "daily. 10 mL 4 8/31/2023    insulin aspart U-100 (NOVOLOG) 100 unit/mL (3 mL) InPn pen Inject 12 Units into the skin 3 (three) times daily with meals.   8/31/2023    insulin glargine (LANTUS) 100 unit/mL injection Inject 50 Units into the skin every evening. (Patient taking differently: Inject 50 Units into the skin once daily.) 15 mL 3 8/31/2023    L.acidophilus-B.bifidum&longum 150 mg (3 billion cell) Chew Take 1 tablet by mouth once daily.   8/31/2023    loteprednol (LOTEMAX) 0.5 % ophthalmic suspension Place 1 drop into the left eye 2 (two) times daily.   8/31/2023    metoclopramide HCl (REGLAN) 10 MG tablet Take 10 mg by mouth once daily.   8/31/2023    netarsudiL (RHOPRESSA) 0.02 % ophthalmic solution Place 1 drop into the left eye every evening.   Past Week    pantoprazole (PROTONIX) 40 MG tablet Take 1 tablet (40 mg total) by mouth once daily. 90 tablet 1 8/31/2023    propylene glycoL (SYSTANE BALANCE) 0.6 % Drop Apply 1 drop to eye 2 (two) times daily.   8/31/2023    simethicone (MYLICON) 80 MG chewable tablet Take 80 mg by mouth once daily.   8/31/2023    vit C,L-Oa-iuykn-lutein-zeaxan (PRESERVISION AREDS-2) 250-90-40-1 mg Cap Take 1 tablet by mouth once daily.   8/31/2023    [DISCONTINUED] antiox #8/om3/dha/epa/lut/zeax (PRESERVISION AREDS 2, OMEGA-3, ORAL) Take 1 tablet by mouth once daily.   8/31/2023    insulin needles, disposable, (SURE-FINE PEN NEEDLES) 31 x 3/16 " Ndle Use twice daily to inject insulin 100 each 3     ondansetron (ZOFRAN-ODT) 4 MG TbDL Take 1 tablet (4 mg total) by mouth every 8 (eight) hours as needed (Nausea). (Patient not taking: Reported on 8/31/2023) 60 tablet 1 Not Taking    pen needle, diabetic 31 gauge x 3/16" Ndle                                         .          "

## 2023-08-31 NOTE — ED NOTES
Patient in ED c/o abdominal pain. Patient is leagally blind, deaf in the left ear and has 60% hearing loss in his right ear.  Patient is a resident of [facility].

## 2023-08-31 NOTE — SUBJECTIVE & OBJECTIVE
Past Medical History:   Diagnosis Date    Arthritis     Cataract     Colon polyps 10/29/2013    Diabetes mellitus type II     Diabetic retinopathy     GERD (gastroesophageal reflux disease) 07/18/2013    Hyperlipidemia LDL goal < 70 07/18/2013    Hypertension     Low BP    JOEL on CPAP     Sleep apnea, unspecified     Uveitis 05/11/2016       Past Surgical History:   Procedure Laterality Date    CATARACT EXTRACTION W/  INTRAOCULAR LENS IMPLANT Left 12/10/14    CATARACT EXTRACTION W/  INTRAOCULAR LENS IMPLANT Right 07/13/2016    CHOLECYSTECTOMY      COLONOSCOPY N/A 2/3/2017    Procedure: COLONOSCOPY;  Surgeon: Natan Magdaleno MD;  Location: [facility];  Service: Endoscopy;  Laterality: N/A;    EYE SURGERY      KNEE SURGERY  1971    left knee    rk rt. eye         Review of patient's allergies indicates:  No Known Allergies    No current facility-administered medications on file prior to encounter.     Current Outpatient Medications on File Prior to Encounter   Medication Sig    acetaZOLAMIDE (DIAMOX SEQUELS) 500 mg CpSR Take 1 capsule (500 mg total) by mouth 2 (two) times daily.    amLODIPine (NORVASC) 5 MG tablet     antiox #8/om3/dha/epa/lut/zeax (PRESERVISION AREDS 2, OMEGA-3, ORAL) Take 1 tablet by mouth once daily.    ARGININE, L-ARGININE, ORAL Take by mouth.    aspirin 81 mg Tab Take 81 mg by mouth before breakfast.    atorvastatin (LIPITOR) 80 MG tablet Take 40 mg by mouth once daily.    bisacodyL (DULCOLAX) 5 mg EC tablet bisacodyl 5 mg tablet,delayed release   Take 4 tablets by oral route as directed.    calcium carbonate (OS-JAMES) 500 mg calcium (1,250 mg) tablet Take 1 tablet (500 mg total) by mouth once daily.    cyanocobalamin (VITAMIN B-12) 1000 MCG tablet Take 100 mcg by mouth once daily.    docusate sodium (COLACE) 100 MG capsule Take 100 mg by mouth 2 (two) times daily.    dorzolamide-timolol 2-0.5% (COSOPT) 22.3-6.8 mg/mL ophthalmic solution Place 1 drop into the left eye 2 (two) times daily.     "ibuprofen (ADVIL,MOTRIN) 200 MG tablet Take 400 mg by mouth daily as needed for Pain.    insulin aspart U-100 (NOVOLOG) 100 unit/mL (3 mL) InPn pen Inject 12 Units into the skin 3 (three) times daily with meals.    insulin glargine (LANTUS) 100 unit/mL injection Inject 50 Units into the skin every evening.    insulin needles, disposable, (SURE-FINE PEN NEEDLES) 31 x 3/16 " Ndle Use twice daily to inject insulin    L.acidophilus-B.bifidum&longum 150 mg (3 billion cell) Chew Take 1 tablet by mouth.    metoclopramide HCl (REGLAN) 10 MG tablet Take 10 mg by mouth once daily.    midodrine (PROAMATINE) 5 MG Tab Take 1 tablet (5 mg total) by mouth every 12 (twelve) hours.    ondansetron (ZOFRAN-ODT) 4 MG TbDL Take 1 tablet (4 mg total) by mouth every 8 (eight) hours as needed (Nausea).    pantoprazole (PROTONIX) 40 MG tablet Take 1 tablet (40 mg total) by mouth once daily.    pen needle, diabetic 31 gauge x 3/16" Ndle     polyethylene glycol (GLYCOLAX) 17 gram/dose powder polyethylene glycol 3350 17 gram/dose oral powder    propranolol HCl (PROPRANOLOL ORAL) Take 20 mg by mouth once daily.    simethicone (MYLICON) 80 MG chewable tablet simethicone 80 mg chewable tablet   Take by oral route.     Family History       Problem Relation (Age of Onset)    Heart disease Mother    Thyroid cancer Maternal Grandmother          Tobacco Use    Smoking status: Never     Passive exposure: Never    Smokeless tobacco: Never   Substance and Sexual Activity    Alcohol use: No    Drug use: No    Sexual activity: Not Currently     Review of Systems   Respiratory:  Positive for shortness of breath.    Gastrointestinal:  Positive for abdominal pain and diarrhea.     Objective:     Vital Signs (Most Recent):  Temp: 98.5 °F (36.9 °C) (08/31/23 0827)  Pulse: 98 (08/31/23 1301)  Resp: (!) 23 (08/31/23 1245)  BP: (!) 148/77 (08/31/23 1301)  SpO2: 96 % (08/31/23 1301) Vital Signs (24h Range):  Temp:  [98.5 °F (36.9 °C)] 98.5 °F (36.9 °C)  Pulse:  " [84-98] 98  Resp:  [22-24] 23  SpO2:  [94 %-97 %] 96 %  BP: (110-158)/(67-89) 148/77     Weight: 122 kg (269 lb)  Body mass index is 40.9 kg/m².     Physical Exam  Vitals and nursing note reviewed.   Constitutional:       Appearance: He is obese.      Comments: Ill appearing   HENT:      Ears:      Comments: Hard of hearing   Cardiovascular:      Rate and Rhythm: Regular rhythm. Tachycardia present.      Pulses: Normal pulses.      Heart sounds: Normal heart sounds.   Pulmonary:      Comments: Tachypnea, diminished breath sounds all lobes, on 3 liters nasal cannula,no wheezing   Abdominal:      Comments: Large obese ab, + BS, non-distended, mildly tender LUQ   Musculoskeletal:         General: No swelling. Normal range of motion.   Skin:     General: Skin is warm and dry.   Neurological:      Mental Status: He is alert and oriented to person, place, and time.           Significant Labs: All pertinent labs within the past 24 hours have been reviewed.    Significant Imaging: I have reviewed all pertinent imaging results/findings within the past 24 hours.

## 2023-08-31 NOTE — ASSESSMENT & PLAN NOTE
"- CT showed moderate stool burden  - patient takes stool softeners and mirilax at home, states his stools are usually "runny"  - constipation may be causing his ab pain, will start aggressive stool regimen     "

## 2023-09-01 ENCOUNTER — DOCUMENTATION ONLY (OUTPATIENT)
Dept: CARDIOLOGY | Facility: CLINIC | Age: 78
End: 2023-09-01
Payer: MEDICARE

## 2023-09-01 VITALS
BODY MASS INDEX: 40.77 KG/M2 | TEMPERATURE: 98 F | HEIGHT: 68 IN | WEIGHT: 269 LBS | HEART RATE: 76 BPM | OXYGEN SATURATION: 94 % | RESPIRATION RATE: 18 BRPM | DIASTOLIC BLOOD PRESSURE: 85 MMHG | SYSTOLIC BLOOD PRESSURE: 175 MMHG

## 2023-09-01 PROBLEM — R79.89 ELEVATED TROPONIN: Status: RESOLVED | Noted: 2023-08-31 | Resolved: 2023-09-01

## 2023-09-01 PROBLEM — N17.9 AKI (ACUTE KIDNEY INJURY): Status: RESOLVED | Noted: 2023-08-31 | Resolved: 2023-09-01

## 2023-09-01 PROBLEM — K59.00 CONSTIPATION: Status: RESOLVED | Noted: 2023-08-31 | Resolved: 2023-09-01

## 2023-09-01 LAB
ALBUMIN SERPL BCP-MCNC: 3.4 G/DL (ref 3.5–5.2)
ALP SERPL-CCNC: 165 U/L (ref 55–135)
ALT SERPL W/O P-5'-P-CCNC: 34 U/L (ref 10–44)
ANION GAP SERPL CALC-SCNC: 9 MMOL/L (ref 8–16)
AST SERPL-CCNC: 30 U/L (ref 10–40)
BASOPHILS # BLD AUTO: 0.06 K/UL (ref 0–0.2)
BASOPHILS NFR BLD: 0.7 % (ref 0–1.9)
BILIRUB SERPL-MCNC: 0.6 MG/DL (ref 0.1–1)
BUN SERPL-MCNC: 19 MG/DL (ref 8–23)
CALCIUM SERPL-MCNC: 8.5 MG/DL (ref 8.7–10.5)
CHLORIDE SERPL-SCNC: 108 MMOL/L (ref 95–110)
CO2 SERPL-SCNC: 25 MMOL/L (ref 23–29)
CREAT SERPL-MCNC: 1.8 MG/DL (ref 0.5–1.4)
DIFFERENTIAL METHOD: ABNORMAL
EOSINOPHIL # BLD AUTO: 0.2 K/UL (ref 0–0.5)
EOSINOPHIL NFR BLD: 2.7 % (ref 0–8)
ERYTHROCYTE [DISTWIDTH] IN BLOOD BY AUTOMATED COUNT: 12.7 % (ref 11.5–14.5)
EST. GFR  (NO RACE VARIABLE): 39 ML/MIN/1.73 M^2
GLUCOSE SERPL-MCNC: 250 MG/DL (ref 70–110)
HCT VFR BLD AUTO: 33.1 % (ref 40–54)
HGB BLD-MCNC: 10.9 G/DL (ref 14–18)
IMM GRANULOCYTES # BLD AUTO: 0.03 K/UL (ref 0–0.04)
IMM GRANULOCYTES NFR BLD AUTO: 0.3 % (ref 0–0.5)
INFLUENZA A, MOLECULAR: NEGATIVE
INFLUENZA B, MOLECULAR: NEGATIVE
LYMPHOCYTES # BLD AUTO: 1.4 K/UL (ref 1–4.8)
LYMPHOCYTES NFR BLD: 15.5 % (ref 18–48)
MCH RBC QN AUTO: 29.4 PG (ref 27–31)
MCHC RBC AUTO-ENTMCNC: 32.9 G/DL (ref 32–36)
MCV RBC AUTO: 89 FL (ref 82–98)
MONOCYTES # BLD AUTO: 0.9 K/UL (ref 0.3–1)
MONOCYTES NFR BLD: 9.7 % (ref 4–15)
NEUTROPHILS # BLD AUTO: 6.4 K/UL (ref 1.8–7.7)
NEUTROPHILS NFR BLD: 71.1 % (ref 38–73)
NRBC BLD-RTO: 0 /100 WBC
PLATELET # BLD AUTO: 214 K/UL (ref 150–450)
PMV BLD AUTO: 10.1 FL (ref 9.2–12.9)
POCT GLUCOSE: 146 MG/DL (ref 70–110)
POCT GLUCOSE: 196 MG/DL (ref 70–110)
POCT GLUCOSE: 224 MG/DL (ref 70–110)
POCT GLUCOSE: 260 MG/DL (ref 70–110)
POTASSIUM SERPL-SCNC: 3.9 MMOL/L (ref 3.5–5.1)
PROT SERPL-MCNC: 6.9 G/DL (ref 6–8.4)
RBC # BLD AUTO: 3.71 M/UL (ref 4.6–6.2)
SODIUM SERPL-SCNC: 142 MMOL/L (ref 136–145)
SPECIMEN SOURCE: NORMAL
WBC # BLD AUTO: 8.97 K/UL (ref 3.9–12.7)

## 2023-09-01 PROCEDURE — 25000003 PHARM REV CODE 250: Performed by: NURSE PRACTITIONER

## 2023-09-01 PROCEDURE — 80053 COMPREHEN METABOLIC PANEL: CPT | Performed by: NURSE PRACTITIONER

## 2023-09-01 PROCEDURE — 87502 INFLUENZA DNA AMP PROBE: CPT | Performed by: HOSPITALIST

## 2023-09-01 PROCEDURE — 63600175 PHARM REV CODE 636 W HCPCS: Performed by: HOSPITALIST

## 2023-09-01 PROCEDURE — 25000003 PHARM REV CODE 250: Performed by: FAMILY MEDICINE

## 2023-09-01 PROCEDURE — 63600175 PHARM REV CODE 636 W HCPCS: Performed by: NURSE PRACTITIONER

## 2023-09-01 PROCEDURE — 25000003 PHARM REV CODE 250: Performed by: HOSPITALIST

## 2023-09-01 PROCEDURE — 27100171 HC OXYGEN HIGH FLOW UP TO 24 HOURS

## 2023-09-01 PROCEDURE — 36415 COLL VENOUS BLD VENIPUNCTURE: CPT | Performed by: NURSE PRACTITIONER

## 2023-09-01 PROCEDURE — G0378 HOSPITAL OBSERVATION PER HR: HCPCS

## 2023-09-01 PROCEDURE — 96372 THER/PROPH/DIAG INJ SC/IM: CPT | Performed by: NURSE PRACTITIONER

## 2023-09-01 PROCEDURE — 94761 N-INVAS EAR/PLS OXIMETRY MLT: CPT

## 2023-09-01 PROCEDURE — 85025 COMPLETE CBC W/AUTO DIFF WBC: CPT | Performed by: NURSE PRACTITIONER

## 2023-09-01 PROCEDURE — 99900035 HC TECH TIME PER 15 MIN (STAT)

## 2023-09-01 PROCEDURE — 96372 THER/PROPH/DIAG INJ SC/IM: CPT | Performed by: HOSPITALIST

## 2023-09-01 RX ORDER — OXYMETAZOLINE HCL 0.05 %
2 SPRAY, NON-AEROSOL (ML) NASAL 2 TIMES DAILY
Status: DISCONTINUED | OUTPATIENT
Start: 2023-09-01 | End: 2023-09-01 | Stop reason: HOSPADM

## 2023-09-01 RX ORDER — DOXYCYCLINE HYCLATE 100 MG
100 TABLET ORAL EVERY 12 HOURS
Qty: 6 TABLET | Refills: 0 | Status: SHIPPED | OUTPATIENT
Start: 2023-09-01 | End: 2023-09-04

## 2023-09-01 RX ORDER — OXYMETAZOLINE HCL 0.05 %
2 SPRAY, NON-AEROSOL (ML) NASAL 2 TIMES DAILY
Qty: 15 ML | Refills: 0 | Status: SHIPPED | OUTPATIENT
Start: 2023-09-01 | End: 2023-09-01 | Stop reason: SDUPTHER

## 2023-09-01 RX ORDER — DOXYCYCLINE HYCLATE 100 MG
100 TABLET ORAL EVERY 12 HOURS
Qty: 6 TABLET | Refills: 0 | Status: SHIPPED | OUTPATIENT
Start: 2023-09-01 | End: 2023-09-01 | Stop reason: SDUPTHER

## 2023-09-01 RX ORDER — DOXYCYCLINE HYCLATE 100 MG
100 TABLET ORAL EVERY 12 HOURS
Status: DISCONTINUED | OUTPATIENT
Start: 2023-09-01 | End: 2023-09-01 | Stop reason: HOSPADM

## 2023-09-01 RX ORDER — OXYMETAZOLINE HCL 0.05 %
2 SPRAY, NON-AEROSOL (ML) NASAL 2 TIMES DAILY
Qty: 15 ML | Refills: 0
Start: 2023-09-01

## 2023-09-01 RX ORDER — INSULIN ASPART 100 [IU]/ML
0-10 INJECTION, SOLUTION INTRAVENOUS; SUBCUTANEOUS
Status: DISCONTINUED | OUTPATIENT
Start: 2023-09-01 | End: 2023-09-01 | Stop reason: HOSPADM

## 2023-09-01 RX ADMIN — INSULIN ASPART 2 UNITS: 100 INJECTION, SOLUTION INTRAVENOUS; SUBCUTANEOUS at 12:09

## 2023-09-01 RX ADMIN — POLYETHYLENE GLYCOL 3350 17 G: 17 POWDER, FOR SOLUTION ORAL at 09:09

## 2023-09-01 RX ADMIN — DOXYCYCLINE HYCLATE 100 MG: 100 TABLET, COATED ORAL at 12:09

## 2023-09-01 RX ADMIN — SENNOSIDES AND DOCUSATE SODIUM 1 TABLET: 50; 8.6 TABLET ORAL at 09:09

## 2023-09-01 RX ADMIN — AMLODIPINE BESYLATE 5 MG: 5 TABLET ORAL at 09:09

## 2023-09-01 RX ADMIN — PROPRANOLOL HYDROCHLORIDE 20 MG: 20 TABLET ORAL at 09:09

## 2023-09-01 RX ADMIN — ASPIRIN 81 MG CHEWABLE TABLET 81 MG: 81 TABLET CHEWABLE at 09:09

## 2023-09-01 RX ADMIN — ENOXAPARIN SODIUM 40 MG: 40 INJECTION SUBCUTANEOUS at 09:09

## 2023-09-01 NOTE — CARE UPDATE
Home Oxygen Evaluation - [facility] - Cardiopulmonary Department      Date Performed: 9/1/2023      1) Patient's Home O2 Sat on room air, while at rest: Room Air SpO2 At Rest: (!) (P) 87 %        If O2 sats on room air at rest are 88% or below, patient qualifies.  Document O2 liter flow needed in Step 2.  If O2 sats are 89% or above, complete Step 3.        2)  If patient is not ambulated and O2 sats are <88%, what is the O2 liter flow required to meet ordered saturation?      If O2 sats on room air while exercising remain 89% or above patient does not qualify, no further testing needed Document N/A in step 3. If O2 sats on room air while exercising are 88% or below, continue to Step 4.    3) Patient's O2 Sat on room air while exercising: Room Air SpO2 During Ambulation: (!) (P) 86 %        4) Patient's O2 Sat while exercising on O2:   at Ambulation O2 LPM: (P) 2 LPM         (Must show improvement from #4 for patients to qualify)    Pt sat edge of bed and placed on RA, spo2 86 with exertion. Pt states he has a home cpap already and I let pt know he would be going home on o2 as well. Pt placed back on 2lpm NC. All questions answered.

## 2023-09-01 NOTE — PLAN OF CARE
Patient discharged with personal belongings. Discharge education and medications reviewed with patient. LDA and telemetry box removed per MD order. Oxygen delivered  to bedside, pt educated on use. Discharge medications paper prescriptions given to patient.     Problem: Adult Inpatient Plan of Care  Goal: Plan of Care Review  Outcome: Met  Goal: Patient-Specific Goal (Individualized)  Outcome: Met  Goal: Absence of Hospital-Acquired Illness or Injury  Outcome: Met  Goal: Optimal Comfort and Wellbeing  Outcome: Met  Goal: Readiness for Transition of Care  Outcome: Met     Problem: Bariatric Environmental Safety  Goal: Safety Maintained with Care  Outcome: Met     Problem: Fluid and Electrolyte Imbalance (Acute Kidney Injury/Impairment)  Goal: Fluid and Electrolyte Balance  Outcome: Met     Problem: Oral Intake Inadequate (Acute Kidney Injury/Impairment)  Goal: Optimal Nutrition Intake  Outcome: Met     Problem: Renal Function Impairment (Acute Kidney Injury/Impairment)  Goal: Effective Renal Function  Outcome: Met     Problem: Diabetes Comorbidity  Goal: Blood Glucose Level Within Targeted Range  Outcome: Met     Problem: Fall Injury Risk  Goal: Absence of Fall and Fall-Related Injury  Outcome: Met

## 2023-09-01 NOTE — PLAN OF CARE
[facility] - Telemetry (Hospital)  Discharge Final Note    Primary Care Provider: Hermelindo Singh MD    Expected Discharge Date: 9/1/2023    Final Discharge Note (most recent)       Final Note - 09/01/23 1605          Final Note    Assessment Type Final Discharge Note     Anticipated Discharge Disposition Home or Self Care     Hospital Resources/Appts/Education Provided Post-Acute resouces added to AVS        Post-Acute Status    Post-Acute Authorization HME     HME Status Set-up Complete/Auth obtained     Discharge Delays None known at this time                   Pt to discharge home to his [facility] today. Home O2 approved via [employer] and portable tank delivered to bedside. AVS updated.     [facility]  for [facility] follow up.     No additional CM needs for discharge.     Important Message from Medicare              Follow-up providers       Hermelindo Singh MD   Specialty: Family Medicine   Relationship: PCP - General    [id number] [facility]  [place] [address]   Phone: [phone]       Next Steps: Schedule an appointment as soon as possible for a visit in 1 week(s)    Instructions: Hospital discharge follow up              After-discharge care                Durable Medical Equipment       [facility]   Service: Durable Medical Equipment    [address] [place] [id number]   Phone: [phone]

## 2023-09-01 NOTE — DISCHARGE SUMMARY
"[facility] - Telemetry ([facility] Medicine  Discharge Summary      Patient Name: Mono Bergman  MRN: [id number]  ALONZO: [id number]  Patient Class: OP- Observation  Admission Date: 8/31/2023  Hospital Length of Stay: 0 days  Discharge Date and Time: No discharge date for patient encounter.  Attending Physician: Ok Astorga MD   Discharging Provider: Ok Astorga MD  Primary Care Provider: Hermelindo Singh MD    Primary Care Team: [facility] MEDICINE B    HPI:   Mr. Bergman is a 78 year old male with a history of HTN, HLD, DM, hearing loss, CKD, JOEL on cpap who resides at [facility] and presents to ED with complains of left sided ab pain x 3 days.  He also complains of dyspnea and that he has had to wear his Cpap more than just at night "over the last 4 months".  Patient is very hard of hearing and is a poor historian. No family at bedside.  Patient also states that he "constantly as liquid stool", but he reports taking both colace and miralax daily.    Patient denies any CP, fever/chills.  In the ED, patient noted to by hypoxic on room air in the low 80's and was placed on 3 liters.  Lab work notable for creatinine 1.9, alk phos 184, , Trop 0.08>0.07.  COVID negative. Vqcan low probability for PE, chest xray without acute processes.   CT ab/pelvis only notable for moderate stool burden.  Patient will be admitted to observation for acute hypoxic respiratory failure, elevated troponin, ab pain, constipation.     Code Status Full  Surrogate Decision Maker Cecil      * No surgery found *      Hospital Course:     9/1/23  Patient reports nasal congestion, asking for spray  Remains on 4L NC, weaned down to 2L  Treat for bronchitis with doxycycline  Pt states he has used home O2 in the past, but not currently  Will need home O2 arrangement  Resides in a [facility], stable for hospital discharge         Goals of Care Treatment Preferences:  Code Status: Full Code      Consults:   Consults " (From admission, onward)          Status Ordering Provider     Inpatient consult to Social Work  Once        Provider:  (Not yet assigned)    Completed BONNIE HDZ            No new Assessment & Plan notes have been filed under this hospital service since the last note was generated.  Service: Hospital Medicine    Final Active Diagnoses:    Diagnosis Date Noted POA    PRINCIPAL PROBLEM:  Acute hypoxemic respiratory failure [J96.01] 08/31/2023 Yes    JOEL on CPAP [G47.33, Z99.89] 04/12/2022 Not Applicable    Type 2 diabetes mellitus with hemoglobin A1c goal of less than 7.0% [E11.9] 01/07/2020 Yes    Hypertension associated with diabetes [E11.59, I15.2] 07/18/2013 Yes    Hyperlipidemia associated with type 2 diabetes mellitus [E11.69, E78.5] 07/18/2013 Yes      Problems Resolved During this Admission:    Diagnosis Date Noted Date Resolved POA    ELIESER (acute kidney injury) [N17.9] 08/31/2023 09/01/2023 Yes    Elevated troponin [R77.8] 08/31/2023 09/01/2023 Yes    Constipation [K59.00] 08/31/2023 09/01/2023 Yes       Discharged Condition: stable    Disposition: Home or Self Care    Follow Up:   Follow-up Information       Hermelindo Singh MD. Schedule an appointment as soon as possible for a visit in 1 week(s).    Specialty: Family Medicine  Why: Hospital discharge follow up  Contact information:  [address] [facility] [address] [phone]                           Patient Instructions:      OXYGEN FOR HOME USE     Order Specific Question Answer Comments   Liter Flow 2    Duration Continuous    Qualifying Test Performed at: Rest    Oxygen saturation: 87    Portable mode: continuous    Route nasal cannula    Device: home concentrator with portable concentrator    Length of need (in months): 99 mos    Patient condition with qualifying saturation Other - List qualifying diagnosis and code    Select a diagnosis & list the code in the comments JOEL (obstructive sleep apnea) [027735]    Select a diagnosis &  "list the code in the comments Acute respiratory failure with hypoxia [078927]    Height: 5' 8" (1.727 m)    Weight: 122 kg (269 lb)    Alternative treatment measures have been tried or considered and deemed clinically ineffective. Yes      ACCEPT - Ambulatory referral/consult to [facility]   Standing Status: Future   Referral Priority: Routine Referral Type: Consultation   Referral Reason: Specialty Services Required   Requested Specialty: Cardiology   Number of Visits Requested: 1     Notify your health care provider if you experience any of the following:  difficulty breathing or increased cough     Notify your health care provider if you experience any of the following:  temperature >100.4     Notify your health care provider if you experience any of the following:  persistent nausea and vomiting or diarrhea     Notify your health care provider if you experience any of the following:  persistent dizziness, light-headedness, or visual disturbances     Activity as tolerated       Significant Diagnostic Studies: Labs: All labs within the past 24 hours have been reviewed    Pending Diagnostic Studies:       Procedure Component Value Units Date/Time    Urinalysis, Reflex to Urine Culture Urine, Clean Catch [[id number]] Collected: 08/31/23 0853    Order Status: Sent Lab Status: In process Updated: 08/31/23 0854    Specimen: Urine            Medications:  Reconciled Home Medications:      Medication List        START taking these medications      doxycycline 100 MG tablet  Commonly known as: VIBRA-TABS  Take 1 tablet (100 mg total) by mouth every 12 (twelve) hours. for 3 days     oxymetazoline 0.05 % nasal spray  Commonly known as: AFRIN  2 sprays by Nasal route 2 (two) times daily.            CHANGE how you take these medications      insulin glargine 100 unit/mL injection  Commonly known as: Lantus  Inject 50 Units into the skin every evening.  What changed: when to take this        " "    CONTINUE taking these medications      alogliptin 12.5 mg Tab  Commonly known as: NESINA  Take 1 tablet by mouth once daily.     amLODIPine 5 MG tablet  Commonly known as: NORVASC  Take 5 mg by mouth once daily.     aspirin 81 mg Tab  Take 81 mg by mouth before breakfast.     atorvastatin 40 MG tablet  Commonly known as: LIPITOR  Take 40 mg by mouth once daily.     brimonidine 0.2% 0.2 % Drop  Commonly known as: ALPHAGAN  Apply 1 drop to eye 2 (two) times a day.     calcium carbonate 500 mg calcium (1,250 mg) tablet  Commonly known as: OS-JAMES  Take 1 tablet (500 mg total) by mouth once daily.     citalopram 10 MG tablet  Commonly known as: CeleXA  Take 10 mg by mouth once daily.     cyanocobalamin 1000 MCG tablet  Commonly known as: VITAMIN B-12  Take 100 mcg by mouth once daily.     docusate sodium 100 MG capsule  Commonly known as: COLACE  Take 100 mg by mouth 2 (two) times daily.     dorzolamide-timolol 2-0.5% 22.3-6.8 mg/mL ophthalmic solution  Commonly known as: COSOPT  Place 1 drop into the left eye 2 (two) times daily.     insulin aspart U-100 100 unit/mL (3 mL) Inpn pen  Commonly known as: NovoLOG  Inject 12 Units into the skin 3 (three) times daily with meals.     L.acidophilus-B.bifidum,longum 150 mg (3 billion cell) Chew  Take 1 tablet by mouth once daily.     loteprednol 0.5 % ophthalmic suspension  Commonly known as: LOTEMAX  Place 1 drop into the left eye 2 (two) times daily.     metoclopramide HCl 10 MG tablet  Commonly known as: REGLAN  Take 10 mg by mouth once daily.     pantoprazole 40 MG tablet  Commonly known as: PROTONIX  Take 1 tablet (40 mg total) by mouth once daily.     pen needle, diabetic 31 gauge x 3/16" Ndle     pen needle, diabetic 31 gauge x 3/16" Ndle  Commonly known as: SURE-FINE PEN NEEDLES  Use twice daily to inject insulin     PRESERVISION AREDS-2 250-90-40-1 mg Cap  Generic drug: vit C,Z-Qb-fnhjc-lutein-zeaxan  Take 1 tablet by mouth once daily.     RHOPRESSA 0.02 % " ophthalmic solution  Generic drug: netarsudiL  Place 1 drop into the left eye every evening.     simethicone 80 MG chewable tablet  Commonly known as: MYLICON  Take 80 mg by mouth once daily.     SYSTANE BALANCE 0.6 % Drop  Generic drug: propylene glycoL  Apply 1 drop to eye 2 (two) times daily.            ASK your doctor about these medications      ondansetron 4 MG Tbdl  Commonly known as: ZOFRAN-ODT  Take 1 tablet (4 mg total) by mouth every 8 (eight) hours as needed (Nausea).              Indwelling Lines/Drains at time of discharge:   Lines/Drains/Airways       None                   Time spent on the discharge of patient: 40 minutes         Ok Astorga MD  Department of Hospital Medicine  [facility] - Telemetry ([facility])

## 2023-09-01 NOTE — CONSULTS
[facility] - Telemetry (Hospital)  Initial Discharge Assessment       Primary Care Provider: Hermelindo Singh MD    Admission Diagnosis: Morbid obesity [E66.01]  Shortness of breath [R06.02]  Heart failure [I50.9]  Chest pain [R07.9]  Acute hypoxemic respiratory failure [J96.01]  Left upper quadrant abdominal pain [R10.12]    Admission Date: 8/31/2023  Expected Discharge Date:          Payor: JDF MEDICARE / Plan: IForem 65 / Product Type: Medicare Advantage /     Extended Emergency Contact Information  Primary Emergency Contact: Cecil Tavarez   [place] [facility]  Mobile Phone: [phone]  Relation: [facility]  [address]  [place] [address]  Phone: [phone] Fax: [phone]    [address] [facility], [place] - [address] [address] [facility]  [address] [facility] [address]  Phone: [phone] Fax: [phone]    [id number] - [place] - [address] [facility]  [address] [facility] [address]  Phone: [phone] Fax: [phone]    [facility] - [place], [place] - [address]  [address] [facility] [address]  Phone: [phone] Fax: [phone]    [facility] [place] - [address]  [address] [place] [address]  Phone: [phone] Fax: [phone]    [facility] - [place], [place] - [address]  [address] [address]  Phone: [phone] Fax: [phone]      Initial Assessment (most recent)       Adult Discharge Assessment - 09/01/23 1302          Discharge Assessment    Assessment Type Discharge Planning Assessment     Confirmed/corrected address, phone number and insurance Yes     Confirmed Demographics Correct on Facesheet     Source of Information patient     Communicated KRUPA with patient/caregiver Date not available/Unable to determine     Reason For Admission Acute  hypoxemic resp failure     People in Home alone     Facility Arrived From: Home     Do you expect to return to your current living situation? Yes     Do you have help at home or someone to help you manage your care at home? No   Indepedent                  SW met with pt at bedside to complete discharge assessment. Pt reports living at home alone. Pt has a CPAP at home. SW discussed possible need for home O2 pending O2 eval. SW inquired which insurance he would prefer for O2 approval. Pt agreeable to using [employer] benefit.   [facility] on standby to review home oxygen order onced placed.  Pt likely to discharge today.     SW updated pt's whiteboard to reflect CM contact number and discharge plan. SW to remain available.

## 2023-09-01 NOTE — PROGRESS NOTES
Heart Failure Transitional Care Clinic (HFTCC) Team notified of pt referral via Ambulatory Referral to Heart Failure Transitional Care ([id number]).    Patient screened today 09/01/2023 by provider and HF nurse for enrollment to program.      Pt was deemed not a candidate for enrollment at this time related to  primary dx bronchitis    Pt will require additional follow up planning per primary team.     If pt status, diagnosis, or treatment plan changes , please place AMB referral to Heart Failure Clinic ([id number]).

## 2023-09-01 NOTE — HOSPITAL COURSE
9/1/23  Patient reports nasal congestion, asking for spray  Remains on 4L NC, weaned down to 2L  Treat for bronchitis with doxycycline  Pt states he has used home O2 in the past, but not currently  Will need home O2 arrangement  Resides in a nursing home, stable for hospital discharge

## 2023-09-01 NOTE — UM SECONDARY REVIEW
Physician Advisor External    PA - Medical Necessity Issue    Approved Observation    Name: Mono Bergman MRN : [id number] Age/Gender: 78 y.o./Male Admit Date: 8/31/2023 Attending Physician: Ok Astorga MD The Order for Review: Place in Observation This recommendation is based upon review of the clinical information provided to me as of the date of this recommendation. Recommendation Summary Recommendation: Observation Supporting Clinical Factors: Patient presented to ED from assisted living facility with 3 days of left sided abdominal pain and dyspnea. ED workup was significant for: oxygen saturation in the low 80's, , COVID negative. VQ scan low probability for pulmonary embolism. CT abd/pelvis only notable for moderate stool burden. Troponin 0.08, 0.07. Patient was tachypneic (22-24) and required supplemental oxygen. Diminished breath sounds noted on exam. Abdomen mildly tender to palpation LUQ. The concern of the attending physician is for: acute hypoxia and abdominal pain in patient with multiple comorbidities (hypertension, diabetes, CKD, JOEL on CPAP). Rationale: Observation services are appropriate for this patient with multiple comorbidities presenting with acute hypoxia and abdominal pain. Plan of Care: The plan of care includes monitor vital signs and oxygen saturation (currently on 3 LNC), Echocardiogram, non contrasted CT chest, supplemental oxygen and duo nebs as needed.

## 2023-09-01 NOTE — PLAN OF CARE
09/01/23 1419   Post-Acute Status   Post-Acute Authorization E   [facility] Status Referrals Sent   Discharge Delays None known at this time   Discharge Plan   Discharge Plan A Home     [facility] to review pt's home O2 order.  RN added to secure chat to follow status.   SW to f/u.

## 2023-09-11 ENCOUNTER — OFFICE VISIT (OUTPATIENT)
Dept: INTERNAL MEDICINE | Facility: CLINIC | Age: 78
End: 2023-09-11
Payer: MEDICARE

## 2023-09-11 VITALS
DIASTOLIC BLOOD PRESSURE: 70 MMHG | HEART RATE: 79 BPM | WEIGHT: 246.25 LBS | TEMPERATURE: 97 F | BODY MASS INDEX: 37.32 KG/M2 | HEIGHT: 68 IN | SYSTOLIC BLOOD PRESSURE: 120 MMHG

## 2023-09-11 DIAGNOSIS — E11.59 HYPERTENSION ASSOCIATED WITH DIABETES: ICD-10-CM

## 2023-09-11 DIAGNOSIS — E11.69 DYSLIPIDEMIA ASSOCIATED WITH TYPE 2 DIABETES MELLITUS: ICD-10-CM

## 2023-09-11 DIAGNOSIS — Z09 HOSPITAL DISCHARGE FOLLOW-UP: Primary | ICD-10-CM

## 2023-09-11 DIAGNOSIS — N18.32 STAGE 3B CHRONIC KIDNEY DISEASE: ICD-10-CM

## 2023-09-11 DIAGNOSIS — I15.2 HYPERTENSION ASSOCIATED WITH DIABETES: ICD-10-CM

## 2023-09-11 DIAGNOSIS — F33.9 DEPRESSION, RECURRENT: ICD-10-CM

## 2023-09-11 DIAGNOSIS — E78.5 DYSLIPIDEMIA ASSOCIATED WITH TYPE 2 DIABETES MELLITUS: ICD-10-CM

## 2023-09-11 DIAGNOSIS — E11.65 UNCONTROLLED TYPE 2 DIABETES MELLITUS WITH HYPERGLYCEMIA: ICD-10-CM

## 2023-09-11 DIAGNOSIS — E66.01 SEVERE OBESITY (BMI 35.0-39.9) WITH COMORBIDITY: ICD-10-CM

## 2023-09-11 PROCEDURE — 99214 PR OFFICE/OUTPT VISIT, EST, LEVL IV, 30-39 MIN: ICD-10-PCS | Mod: S$GLB,,, | Performed by: FAMILY MEDICINE

## 2023-09-11 PROCEDURE — 1101F PR PT FALLS ASSESS DOC 0-1 FALLS W/OUT INJ PAST YR: ICD-10-PCS | Mod: CPTII,S$GLB,, | Performed by: FAMILY MEDICINE

## 2023-09-11 PROCEDURE — 99999 PR PBB SHADOW E&M-EST. PATIENT-LVL III: CPT | Mod: PBBFAC,,, | Performed by: FAMILY MEDICINE

## 2023-09-11 PROCEDURE — 3074F PR MOST RECENT SYSTOLIC BLOOD PRESSURE < 130 MM HG: ICD-10-PCS | Mod: CPTII,S$GLB,, | Performed by: FAMILY MEDICINE

## 2023-09-11 PROCEDURE — 1125F AMNT PAIN NOTED PAIN PRSNT: CPT | Mod: CPTII,S$GLB,, | Performed by: FAMILY MEDICINE

## 2023-09-11 PROCEDURE — 3078F PR MOST RECENT DIASTOLIC BLOOD PRESSURE < 80 MM HG: ICD-10-PCS | Mod: CPTII,S$GLB,, | Performed by: FAMILY MEDICINE

## 2023-09-11 PROCEDURE — 1159F PR MEDICATION LIST DOCUMENTED IN MEDICAL RECORD: ICD-10-PCS | Mod: CPTII,S$GLB,, | Performed by: FAMILY MEDICINE

## 2023-09-11 PROCEDURE — 1101F PT FALLS ASSESS-DOCD LE1/YR: CPT | Mod: CPTII,S$GLB,, | Performed by: FAMILY MEDICINE

## 2023-09-11 PROCEDURE — 3078F DIAST BP <80 MM HG: CPT | Mod: CPTII,S$GLB,, | Performed by: FAMILY MEDICINE

## 2023-09-11 PROCEDURE — 3074F SYST BP LT 130 MM HG: CPT | Mod: CPTII,S$GLB,, | Performed by: FAMILY MEDICINE

## 2023-09-11 PROCEDURE — 3288F PR FALLS RISK ASSESSMENT DOCUMENTED: ICD-10-PCS | Mod: CPTII,S$GLB,, | Performed by: FAMILY MEDICINE

## 2023-09-11 PROCEDURE — 1160F RVW MEDS BY RX/DR IN RCRD: CPT | Mod: CPTII,S$GLB,, | Performed by: FAMILY MEDICINE

## 2023-09-11 PROCEDURE — 1160F PR REVIEW ALL MEDS BY PRESCRIBER/CLIN PHARMACIST DOCUMENTED: ICD-10-PCS | Mod: CPTII,S$GLB,, | Performed by: FAMILY MEDICINE

## 2023-09-11 PROCEDURE — 1159F MED LIST DOCD IN RCRD: CPT | Mod: CPTII,S$GLB,, | Performed by: FAMILY MEDICINE

## 2023-09-11 PROCEDURE — 1125F PR PAIN SEVERITY QUANTIFIED, PAIN PRESENT: ICD-10-PCS | Mod: CPTII,S$GLB,, | Performed by: FAMILY MEDICINE

## 2023-09-11 PROCEDURE — 3288F FALL RISK ASSESSMENT DOCD: CPT | Mod: CPTII,S$GLB,, | Performed by: FAMILY MEDICINE

## 2023-09-11 PROCEDURE — 99999 PR PBB SHADOW E&M-EST. PATIENT-LVL III: ICD-10-PCS | Mod: PBBFAC,,, | Performed by: FAMILY MEDICINE

## 2023-09-11 PROCEDURE — 99214 OFFICE O/P EST MOD 30 MIN: CPT | Mod: S$GLB,,, | Performed by: FAMILY MEDICINE

## 2023-09-11 RX ORDER — PANTOPRAZOLE SODIUM 40 MG/1
40 TABLET, DELAYED RELEASE ORAL DAILY
Qty: 90 TABLET | Refills: 1 | Status: SHIPPED | OUTPATIENT
Start: 2023-09-11 | End: 2024-09-10

## 2023-09-11 RX ORDER — POLYETHYLENE GLYCOL 3350 17 G/17G
17 POWDER, FOR SOLUTION ORAL DAILY
Qty: 850 G | Refills: 1 | Status: SHIPPED | OUTPATIENT
Start: 2023-09-11

## 2023-09-11 NOTE — PROGRESS NOTES
"Subjective:      Patient ID: Mono Bergman is a 78 y.o. male.    Chief Complaint: hospital fu    HPI  79 yo here for hosp fu.  Taking meds, sent on oxygen but not needing at home.  Advise to get referral for heart failure clinic//referral was entered 9/8/23.  Pt feeling ok, some GERD/constipation.  No CP  SOB stable//cough improved    Past Medical History:   Diagnosis Date    Arthritis     Cataract     Colon polyps 10/29/2013    Depression, recurrent 9/11/2023    Diabetes mellitus type II     Diabetic retinopathy     GERD (gastroesophageal reflux disease) 07/18/2013    Hyperlipidemia LDL goal < 70 07/18/2013    Hypertension     Low BP    JOEL on CPAP     Sleep apnea, unspecified     Uveitis 05/11/2016     Family History   Problem Relation Age of Onset    Heart disease Mother     Thyroid cancer Maternal Grandmother     Colon cancer Neg Hx      Past Surgical History:   Procedure Laterality Date    CATARACT EXTRACTION W/  INTRAOCULAR LENS IMPLANT Left 12/10/14    CATARACT EXTRACTION W/  INTRAOCULAR LENS IMPLANT Right 07/13/2016    CHOLECYSTECTOMY      COLONOSCOPY N/A 2/3/2017    Procedure: COLONOSCOPY;  Surgeon: Natan Magdaleno MD;  Location: [facility];  Service: Endoscopy;  Laterality: N/A;    EYE SURGERY      KNEE SURGERY  1971    left knee    rk rt. eye       Social History     Tobacco Use    Smoking status: Never     Passive exposure: Never    Smokeless tobacco: Never   Substance Use Topics    Alcohol use: No    Drug use: No       /70 (BP Location: Left arm)   Pulse 79   Temp 97 °F (36.1 °C)   Ht 5' 8" (1.727 m)   Wt 111.7 kg (246 lb 4.1 oz)   BMI 37.44 kg/m²     Review of Systems   Constitutional:  Positive for activity change. Negative for appetite change, chills, diaphoresis, fatigue, fever and unexpected weight change.   HENT:  Negative for hearing loss and tinnitus.    Eyes:  Negative for visual disturbance.   Respiratory:  Negative for cough, chest tightness, shortness of breath and " wheezing.    Cardiovascular:  Negative for chest pain, palpitations and leg swelling.   Gastrointestinal:  Positive for constipation. Negative for abdominal distention and abdominal pain.   Genitourinary:  Negative for difficulty urinating.   Neurological:  Negative for dizziness, weakness and headaches.       Objective:     Physical Exam  Vitals and nursing note reviewed.   Constitutional:       General: He is not in acute distress.     Appearance: He is well-developed. He is not diaphoretic.   HENT:      Head: Normocephalic and atraumatic.   Eyes:      General:         Right eye: No discharge.         Left eye: No discharge.   Cardiovascular:      Rate and Rhythm: Normal rate and regular rhythm.   Pulmonary:      Effort: Pulmonary effort is normal. No respiratory distress.   Musculoskeletal:      Right lower leg: No edema.      Left lower leg: No edema.   Neurological:      Mental Status: He is alert and oriented to person, place, and time.   Psychiatric:         Behavior: Behavior normal.         Thought Content: Thought content normal.         Judgment: Judgment normal.         Lab Results   Component Value Date    WBC 8.97 09/01/2023    HGB 10.9 (L) 09/01/2023    HCT 33.1 (L) 09/01/2023     09/01/2023    CHOL 125 05/22/2023    TRIG 119 05/22/2023    HDL 32 (L) 05/22/2023    ALT 34 09/01/2023    AST 30 09/01/2023     09/01/2023    K 3.9 09/01/2023     09/01/2023    CREATININE 1.8 (H) 09/01/2023    BUN 19 09/01/2023    CO2 25 09/01/2023    TSH 2.436 05/22/2023    PSA 2.8 05/22/2023    INR 1.0 05/01/2017    HGBA1C 8.0 (H) 08/31/2023       Assessment:     1. Hospital discharge follow-up    2. Uncontrolled type 2 diabetes mellitus with hyperglycemia    3. Hypertension associated with diabetes    4. Dyslipidemia associated with type 2 diabetes mellitus    5. Stage 3b chronic kidney disease    6. Severe obesity (BMI 35.0-39.9) with comorbidity    7. Depression, recurrent         Plan:     Hospital  discharge follow-up    Uncontrolled type 2 diabetes mellitus with hyperglycemia    Hypertension associated with diabetes    Dyslipidemia associated with type 2 diabetes mellitus    Stage 3b chronic kidney disease    Severe obesity (BMI 35.0-39.9) with comorbidity    Depression, recurrent    Other orders  -     pantoprazole (PROTONIX) 40 MG tablet; Take 1 tablet (40 mg total) by mouth once daily.  Dispense: 90 tablet; Refill: 1  -     polyethylene glycol (GLYCOLAX) 17 gram/dose powder; Take 17 g by mouth once daily.  Dispense: 850 g; Refill: 1    Hospital records reviewed  Refill on protonix 40mg daily  Miralax for bowels  Labs stable//A1C is up//work on improving diet/cont per fu  Fu as scheduled

## 2023-10-03 ENCOUNTER — OFFICE VISIT (OUTPATIENT)
Dept: OPHTHALMOLOGY | Facility: CLINIC | Age: 78
End: 2023-10-03
Payer: MEDICARE

## 2023-10-03 DIAGNOSIS — Z79.4 TYPE 2 DIABETES MELLITUS WITH BOTH EYES AFFECTED BY MODERATE NONPROLIFERATIVE RETINOPATHY WITHOUT MACULAR EDEMA, WITH LONG-TERM CURRENT USE OF INSULIN: Primary | ICD-10-CM

## 2023-10-03 DIAGNOSIS — H40.62X4 GLAUCOMA OF LEFT EYE SECONDARY TO DRUGS, INDETERMINATE STAGE: ICD-10-CM

## 2023-10-03 DIAGNOSIS — H18.9 KERATOPATHY: ICD-10-CM

## 2023-10-03 DIAGNOSIS — Z96.1 PSEUDOPHAKIA: ICD-10-CM

## 2023-10-03 DIAGNOSIS — E11.3393 TYPE 2 DIABETES MELLITUS WITH BOTH EYES AFFECTED BY MODERATE NONPROLIFERATIVE RETINOPATHY WITHOUT MACULAR EDEMA, WITH LONG-TERM CURRENT USE OF INSULIN: Primary | ICD-10-CM

## 2023-10-03 PROCEDURE — 2022F PR DILATED RETINAL EYE EXAM WITH INTERP/REVIEW: ICD-10-PCS | Mod: CPTII,S$GLB,, | Performed by: OPHTHALMOLOGY

## 2023-10-03 PROCEDURE — 1159F MED LIST DOCD IN RCRD: CPT | Mod: CPTII,S$GLB,, | Performed by: OPHTHALMOLOGY

## 2023-10-03 PROCEDURE — 92014 COMPRE OPH EXAM EST PT 1/>: CPT | Mod: S$GLB,,, | Performed by: OPHTHALMOLOGY

## 2023-10-03 PROCEDURE — 1160F PR REVIEW ALL MEDS BY PRESCRIBER/CLIN PHARMACIST DOCUMENTED: ICD-10-PCS | Mod: CPTII,S$GLB,, | Performed by: OPHTHALMOLOGY

## 2023-10-03 PROCEDURE — 99999 PR PBB SHADOW E&M-EST. PATIENT-LVL II: ICD-10-PCS | Mod: PBBFAC,,, | Performed by: OPHTHALMOLOGY

## 2023-10-03 PROCEDURE — 99999 PR PBB SHADOW E&M-EST. PATIENT-LVL II: CPT | Mod: PBBFAC,,, | Performed by: OPHTHALMOLOGY

## 2023-10-03 PROCEDURE — 92134 POSTERIOR SEGMENT OCT RETINA (OCULAR COHERENCE TOMOGRAPHY)-BOTH EYES: ICD-10-PCS | Mod: S$GLB,,, | Performed by: OPHTHALMOLOGY

## 2023-10-03 PROCEDURE — 1159F PR MEDICATION LIST DOCUMENTED IN MEDICAL RECORD: ICD-10-PCS | Mod: CPTII,S$GLB,, | Performed by: OPHTHALMOLOGY

## 2023-10-03 PROCEDURE — 1126F AMNT PAIN NOTED NONE PRSNT: CPT | Mod: CPTII,S$GLB,, | Performed by: OPHTHALMOLOGY

## 2023-10-03 PROCEDURE — 2022F DILAT RTA XM EVC RTNOPTHY: CPT | Mod: CPTII,S$GLB,, | Performed by: OPHTHALMOLOGY

## 2023-10-03 PROCEDURE — 1160F RVW MEDS BY RX/DR IN RCRD: CPT | Mod: CPTII,S$GLB,, | Performed by: OPHTHALMOLOGY

## 2023-10-03 PROCEDURE — 1126F PR PAIN SEVERITY QUANTIFIED, NO PAIN PRESENT: ICD-10-PCS | Mod: CPTII,S$GLB,, | Performed by: OPHTHALMOLOGY

## 2023-10-03 PROCEDURE — 92134 CPTRZ OPH DX IMG PST SGM RTA: CPT | Mod: S$GLB,,, | Performed by: OPHTHALMOLOGY

## 2023-10-03 PROCEDURE — 92014 PR EYE EXAM, EST PATIENT,COMPREHESV: ICD-10-PCS | Mod: S$GLB,,, | Performed by: OPHTHALMOLOGY

## 2023-10-03 NOTE — PROGRESS NOTES
===============================  Date today is 10/3/2023  Mono Bergman is a 78 y.o. male  Last visit [facility]: :5/30/2023   Last visit eye dept. 5/30/2023    Uncorrected distance visual acuity was 20/200 in the right eye and 20/400 in the left eye.  Tonometry       Tonometry (Applanation, 10:23 AM)         Right Left    Pressure 10 13              Max Pressure         Right Left    Max 22 31 (7/27/2021)                  Not recorded       Not recorded       Not recorded       Chief Complaint   Patient presents with    PDR/ME     4 MONTHS CHECK     HPI     PDR/ME     Additional comments: 4 MONTHS CHECK           Comments    States that his vision is stable and needs a written note stating what   drops and meds he should be taking.     1.) + DM SINCE 1997   2.) + PDR OU/ CSME OS   Bilateral PRP   PPV OS with Dr. 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 TEARS QID OS PRN  REFRESH GEL BID OS   OMEGA E BID P.O.     Eylea OD last 4/7/22, 5/10/22  6/2/22 7/21/22  9/8/22  Eylea OS last 2/10/22  Ozurdex OS 3/26/2021 (steroid responder - IOP in 40s)          Last edited by Usha Johnson on 10/3/2023 10:19 AM.      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  Check OU independently daily.    Gave my office and personal cell phone number.  ________________  10/3/2023 today  Mono Bergman    DM with DME OU  OCT worse DME OU today  Post PRP OU  PCIOL OU  IOP ok today  Discussed resuming injections  Failed Eylea, Avastin and Ozurdex  Recommend Vabysmo OU    RTC in November for Vabysmo OU  Instructed to call 24/7 for any worsening of vision or symptoms. Check OU daily.   Gave my office and cell phone number.    =============================

## 2023-11-07 ENCOUNTER — OFFICE VISIT (OUTPATIENT)
Dept: DIABETES | Facility: CLINIC | Age: 78
End: 2023-11-07
Payer: MEDICARE

## 2023-11-07 ENCOUNTER — PROCEDURE VISIT (OUTPATIENT)
Dept: OPHTHALMOLOGY | Facility: CLINIC | Age: 78
End: 2023-11-07
Payer: MEDICARE

## 2023-11-07 ENCOUNTER — TELEPHONE (OUTPATIENT)
Dept: DIABETES | Facility: CLINIC | Age: 78
End: 2023-11-07

## 2023-11-07 VITALS
BODY MASS INDEX: 38.18 KG/M2 | SYSTOLIC BLOOD PRESSURE: 111 MMHG | WEIGHT: 251.13 LBS | DIASTOLIC BLOOD PRESSURE: 73 MMHG

## 2023-11-07 DIAGNOSIS — Z79.4 UNCONTROLLED TYPE 2 DIABETES MELLITUS WITH HYPERGLYCEMIA, WITH LONG-TERM CURRENT USE OF INSULIN: Primary | ICD-10-CM

## 2023-11-07 DIAGNOSIS — E78.5 HYPERLIPIDEMIA ASSOCIATED WITH TYPE 2 DIABETES MELLITUS: ICD-10-CM

## 2023-11-07 DIAGNOSIS — E11.65 UNCONTROLLED TYPE 2 DIABETES MELLITUS WITH HYPERGLYCEMIA, WITH LONG-TERM CURRENT USE OF INSULIN: Primary | ICD-10-CM

## 2023-11-07 DIAGNOSIS — E11.59 HYPERTENSION ASSOCIATED WITH DIABETES: ICD-10-CM

## 2023-11-07 DIAGNOSIS — Z79.4 TYPE 2 DIABETES MELLITUS WITH BOTH EYES AFFECTED BY MODERATE NONPROLIFERATIVE RETINOPATHY WITHOUT MACULAR EDEMA, WITH LONG-TERM CURRENT USE OF INSULIN: Primary | ICD-10-CM

## 2023-11-07 DIAGNOSIS — E11.3393 TYPE 2 DIABETES MELLITUS WITH BOTH EYES AFFECTED BY MODERATE NONPROLIFERATIVE RETINOPATHY WITHOUT MACULAR EDEMA, WITH LONG-TERM CURRENT USE OF INSULIN: Primary | ICD-10-CM

## 2023-11-07 DIAGNOSIS — E11.69 HYPERLIPIDEMIA ASSOCIATED WITH TYPE 2 DIABETES MELLITUS: ICD-10-CM

## 2023-11-07 DIAGNOSIS — I15.2 HYPERTENSION ASSOCIATED WITH DIABETES: ICD-10-CM

## 2023-11-07 DIAGNOSIS — E11.3513 PROLIFERATIVE DIABETIC RETINOPATHY OF BOTH EYES WITH MACULAR EDEMA ASSOCIATED WITH TYPE 2 DIABETES MELLITUS: ICD-10-CM

## 2023-11-07 PROCEDURE — 3074F SYST BP LT 130 MM HG: CPT | Mod: CPTII,S$GLB,, | Performed by: NURSE PRACTITIONER

## 2023-11-07 PROCEDURE — 99214 OFFICE O/P EST MOD 30 MIN: CPT | Mod: S$GLB,,, | Performed by: NURSE PRACTITIONER

## 2023-11-07 PROCEDURE — 3078F PR MOST RECENT DIASTOLIC BLOOD PRESSURE < 80 MM HG: ICD-10-PCS | Mod: CPTII,S$GLB,, | Performed by: NURSE PRACTITIONER

## 2023-11-07 PROCEDURE — 99999 PR PBB SHADOW E&M-EST. PATIENT-LVL V: CPT | Mod: PBBFAC,,, | Performed by: NURSE PRACTITIONER

## 2023-11-07 PROCEDURE — 67028 INJECTION EYE DRUG: CPT | Mod: RT,S$GLB,, | Performed by: OPHTHALMOLOGY

## 2023-11-07 PROCEDURE — 3074F PR MOST RECENT SYSTOLIC BLOOD PRESSURE < 130 MM HG: ICD-10-PCS | Mod: CPTII,S$GLB,, | Performed by: NURSE PRACTITIONER

## 2023-11-07 PROCEDURE — 3078F DIAST BP <80 MM HG: CPT | Mod: CPTII,S$GLB,, | Performed by: NURSE PRACTITIONER

## 2023-11-07 PROCEDURE — 1160F PR REVIEW ALL MEDS BY PRESCRIBER/CLIN PHARMACIST DOCUMENTED: ICD-10-PCS | Mod: CPTII,S$GLB,, | Performed by: NURSE PRACTITIONER

## 2023-11-07 PROCEDURE — 99499 NO LOS: ICD-10-PCS | Mod: S$GLB,,, | Performed by: OPHTHALMOLOGY

## 2023-11-07 PROCEDURE — 99499 UNLISTED E&M SERVICE: CPT | Mod: S$GLB,,, | Performed by: OPHTHALMOLOGY

## 2023-11-07 PROCEDURE — 1126F AMNT PAIN NOTED NONE PRSNT: CPT | Mod: CPTII,S$GLB,, | Performed by: NURSE PRACTITIONER

## 2023-11-07 PROCEDURE — 1160F RVW MEDS BY RX/DR IN RCRD: CPT | Mod: CPTII,S$GLB,, | Performed by: NURSE PRACTITIONER

## 2023-11-07 PROCEDURE — 92134 CPTRZ OPH DX IMG PST SGM RTA: CPT | Mod: S$GLB,,, | Performed by: OPHTHALMOLOGY

## 2023-11-07 PROCEDURE — 99999 PR PBB SHADOW E&M-EST. PATIENT-LVL V: ICD-10-PCS | Mod: PBBFAC,,, | Performed by: NURSE PRACTITIONER

## 2023-11-07 PROCEDURE — 1126F PR PAIN SEVERITY QUANTIFIED, NO PAIN PRESENT: ICD-10-PCS | Mod: CPTII,S$GLB,, | Performed by: NURSE PRACTITIONER

## 2023-11-07 PROCEDURE — 1159F PR MEDICATION LIST DOCUMENTED IN MEDICAL RECORD: ICD-10-PCS | Mod: CPTII,S$GLB,, | Performed by: NURSE PRACTITIONER

## 2023-11-07 PROCEDURE — 99214 PR OFFICE/OUTPT VISIT, EST, LEVL IV, 30-39 MIN: ICD-10-PCS | Mod: S$GLB,,, | Performed by: NURSE PRACTITIONER

## 2023-11-07 PROCEDURE — 1159F MED LIST DOCD IN RCRD: CPT | Mod: CPTII,S$GLB,, | Performed by: NURSE PRACTITIONER

## 2023-11-07 PROCEDURE — 67028 PR INJECT INTRAVITREAL PHARMCOLOGIC: ICD-10-PCS | Mod: RT,S$GLB,, | Performed by: OPHTHALMOLOGY

## 2023-11-07 PROCEDURE — 92134 POSTERIOR SEGMENT OCT RETINA (OCULAR COHERENCE TOMOGRAPHY)-BOTH EYES: ICD-10-PCS | Mod: S$GLB,,, | Performed by: OPHTHALMOLOGY

## 2023-11-07 RX ORDER — INSULIN ASPART 100 [IU]/ML
14 INJECTION, SOLUTION INTRAVENOUS; SUBCUTANEOUS
Qty: 15 ML | Refills: 11 | Status: SHIPPED | OUTPATIENT
Start: 2023-11-07 | End: 2023-11-07 | Stop reason: SDUPTHER

## 2023-11-07 RX ORDER — INSULIN ASPART 100 [IU]/ML
14 INJECTION, SOLUTION INTRAVENOUS; SUBCUTANEOUS
Qty: 15 ML | Refills: 11 | Status: SHIPPED | OUTPATIENT
Start: 2023-11-07

## 2023-11-07 RX ORDER — INSULIN GLARGINE 100 [IU]/ML
50 INJECTION, SOLUTION SUBCUTANEOUS DAILY
Qty: 15 ML | Refills: 11 | Status: SHIPPED | OUTPATIENT
Start: 2023-11-07

## 2023-11-07 RX ORDER — INSULIN GLARGINE 100 [IU]/ML
50 INJECTION, SOLUTION SUBCUTANEOUS DAILY
Qty: 15 ML | Refills: 11 | Status: SHIPPED | OUTPATIENT
Start: 2023-11-07 | End: 2023-11-07 | Stop reason: SDUPTHER

## 2023-11-07 NOTE — PROGRESS NOTES
Subjective:         Patient ID: Mono Bergman is a 78 y.o. male.  Patient's current PCP is Hermelindo Singh MD.     Chief Complaint: Diabetes Mellitus    HPI  Mono Bergman is a 78 y.o. White male presenting for a follow up for diabetes. Patient has been diagnosed with type 2 diabetes for several years. Diagnosed about 30 years ago -working for the  at the time-syncopal episode and reports glucose was > 500 at that time.    CURRENT DM MEDICATIONS:   Diabetes Medications               alogliptin (NESINA) 12.5 mg Tab Take 1 tablet by mouth once daily.    insulin aspart U-100 (NOVOLOG) 100 unit/mL (3 mL) InPn pen Inject 12 Units into the skin 3 (three) times daily with meals.    insulin glargine (LANTUS) 100 unit/mL injection Inject 50 Units into the skin every evening.           Past failed treatment include: Glyburide, Metformin, and 70/30    Blood glucose testing is performed regularly. Patient is testing 4 times per day.  Meter: Needs Dexcom G7 (Benky[employer] Player)  Wants to switch to G6 due to difficulty reading the screen, vs trial of Annetta 3 if approved on insurance.   Preferred lab: [facility]    Any episodes of hypoglycemia? 0% per Dexcom    Complications related to diabetes: retinopathy and peripheral neuropathy    His blood sugar in the clinic today was:   Lab Results   Component Value Date    POCGLU 200 (A) 07/28/2022     Mono Bergman presents today for follow up visit to discuss diabetes management. He started the GOLO diet yesterday. Between visits[facility] Nov[facility] to 14 units TID AC. He was started on G7 but is having a lot of issues reading the screen. He would either like to change back to G6, or switch to a different CGM. We discussed Annetta 3 - will see if this is approved.     He has an upcoming appt to see PCP in December.     Per Dexcom download, for the last 14 days: Glycemic control remains above goal. Avg glucose of 258 mg/dL. He is only 16% in range.  "28% of readings are high, with 56% of readings > 250. No hypoglycemia. High glycemic variability with SD of 68 mg/dL. No estimated GMI provided. He has a pattern of afterning/evening/overnight Bgs with a pattern of decrease in blood sugar in the early morning hours (not leading to hypoglycemia). Agree with increase in Novolog with meals per [facility], however, will defer making any further changes as he has recently started a diet yesterday.       [facility]     DR- Followed by Dr. Tucker. He had a R eye injection today.     Current diet: Diabetic  Activity Level: Sednetary    Lab Results   Component Value Date    HGBA1C 8.0 (H) 08/31/2023    HGBA1C 7.7 (H) 05/22/2023    HGBA1C 7.1 (H) 07/05/2022     STANDARDS OF CARE  Diabetes Management Status    Statin: Taking  ACE/ARB: Not taking    Screening or Prevention Patient's value Goal Complete/Controlled?   HgA1C Testing and Control   Lab Results   Component Value Date    HGBA1C 8.0 (H) 08/31/2023      Annually/Less than 8% No   Lipid profile : 05/22/2023 Annually Yes   LDL control Lab Results   Component Value Date    LDLCALC 69.2 05/22/2023    Annually/Less than 100 mg/dl  Yes   Nephropathy screening Lab Results   Component Value Date    LABMICR 1001.0 07/07/2022     No results found for: "PROTEINUA"  No results found for: "UTPCR"   Annually No   Blood pressure BP Readings from Last 1 Encounters:   11/07/23 111/73    Less than 140/90 Yes   Dilated retinal exam : 10/03/2023 Annually Yes   Foot exam   : 11/07/2023 Annually Yes      Labs reviewed and are noted below.    Lab Results   Component Value Date    WBC 8.97 09/01/2023    HGB 10.9 (L) 09/01/2023    HCT 33.1 (L) 09/01/2023     09/01/2023    CHOL 125 05/22/2023    TRIG 119 05/22/2023    HDL 32 (L) 05/22/2023    LDLCALC 69.2 05/22/2023    ALT 34 09/01/2023    AST 30 09/01/2023     09/01/2023    K 3.9 09/01/2023     09/01/2023    ANIONGAP 9 09/01/2023    CREATININE 1.8 (H) " "09/01/2023    ESTGFRAFRICA 39.3 (A) 04/12/2022    EGFRNONAA 34.0 (A) 04/12/2022    BUN 19 09/01/2023    CO2 25 09/01/2023    TSH 2.436 05/22/2023    PSA 2.8 05/22/2023    INR 1.0 05/01/2017     (H) 09/01/2023     Lab Results   Component Value Date    TSH 2.436 05/22/2023    IRON 88 07/05/2022    TIBC 269 07/05/2022    FERRITIN 84 07/05/2022    CALCIUM 8.5 (L) 09/01/2023     No results found for: "CPEPTIDE"  No results found for: "GLUTAMICACID"  Glucose   Date Value Ref Range Status   09/01/2023 250 (H) 70 - 110 mg/dL Final     Anion Gap   Date Value Ref Range Status   09/01/2023 9 8 - 16 mmol/L Final     eGFR if    Date Value Ref Range Status   04/12/2022 39.3 (A) >60 mL/min/1.73 m^2 Final     eGFR if non    Date Value Ref Range Status   04/12/2022 34.0 (A) >60 mL/min/1.73 m^2 Final     Comment:     Calculation used to obtain the estimated glomerular filtration  rate (eGFR) is the CKD-EPI equation.        The following portions of the patient's history were reviewed and updated as appropriate: allergies, current medications, past family history, past medical history, past social history, past surgical history and problem list.    Review of patient's allergies indicates:  No Known Allergies  Social History     Socioeconomic History    Marital status: Single    Number of children: 0   Occupational History    Occupation: retired     Employer: retired   Tobacco Use    Smoking status: Never     Passive exposure: Never    Smokeless tobacco: Never   Substance and Sexual Activity    Alcohol use: No    Drug use: No    Sexual activity: Not Currently     Past Medical History:   Diagnosis Date    Arthritis     Cataract     Colon polyps 10/29/2013    Depression, recurrent 9/11/2023    Diabetes mellitus type II     Diabetic retinopathy     GERD (gastroesophageal reflux disease) 07/18/2013    Hyperlipidemia LDL goal < 70 07/18/2013    Hypertension     Low BP    JOEL on CPAP     Sleep apnea, " unspecified     Uveitis 05/11/2016     REVIEW OF SYSTEMS:  Eyes History of DR.  Cardiovascular: History of HTN and hyperlipidemia.  GI:History of GERD.  : No CKD.  Neuro: Positive neuropathy.  PSYCH: No tobacco use.  ENDO: See HPI.        Objective:      Vitals:    11/07/23 1115   BP: 111/73     RESPIRATORY: No respiratory distress  NEUROLOGIC: Cranial nerves II-XII grossly intact.   PSYCHIATRIC: Alert & oriented x3. Normal mood and affect.  FOOT EXAMINATION: Protective Sensation (w/ 10 gram monofilament):  Right: Intact  Left: Absent    Visual Inspection:  Normal -  Bilateral, Nails Intact - without Evidence of Foot Deformity- Bilateral, and Dry Skin -  Bilateral    Pedal Pulses:   Right: Present  Left: Present    Posterior Tibialis Pulses:   Right:Present  Left: Present    Assessment:       1. Uncontrolled type 2 diabetes mellitus with hyperglycemia, with long-term current use of insulin    2. 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 Hypertension associated with diabetes    4. Hyperlipidemia associated with type 2 diabetes mellitus        Plan:   Mono was seen today for diabetes mellitus.    Diagnoses and all orders for this visit:    Uncontrolled type 2 diabetes mellitus with hyperglycemia, with long-term current use of insulin  -     POCT Glucose, Hand-Held Device  -     insulin aspart U-100 (NOVOLOG) 100 unit/mL (3 mL) InPn pen; Inject 14 Units into the skin 3 (three) times daily with meals.  -     insulin glargine (LANTUS) 100 unit/mL injection; Inject 50 Units into the skin once daily.    Medications adjusted for today's visit include:    Switch back to Dexcom G6 vs Annetta 3 if covered due to difficulty reading the screen of Dexcom G7.     Continue Lantus 50 units daily.    Continue Novolog 14 units three times a day before each main meal.     Proliferative diabetic retinopathy of both eyes with macular edema associated with type 2  "diabetes mellitus    Chronic - Received R eye injection today with Dr. Tucker.    Hypertension associated with diabetes    Hyperlipidemia associated with type 2 diabetes mellitus    - Follow up: 3 months    Portions of this note may have been created with voice recognition software. Occasional "wrong-word" or "sound-a-like" substitutions may have occurred due to the inherent limitations of voice recognition software. Please, read the note carefully and recognize, using context, where substitutions have occurred.           Olamide Watkins,DEUCE-C, BC-ADM  [facility]  "

## 2023-11-07 NOTE — PROGRESS NOTES
===============================  Date today is 11/7/2023  Mono Bergman is a 78 y.o. male  Last visit [facility]: :10/3/2023   Last visit eye dept. 10/3/2023    Uncorrected distance visual acuity was 20/100 in the right eye and 20/400 .cno in the left eye.  Tonometry       Tonometry (Applanation, 10:23 AM)         Right Left    Pressure 22 26              Max Pressure         Right Left    Max 22 (11/7/2023) 31 (7/27/2021)                  Not recorded       Not recorded       Not recorded       Chief Complaint   Patient presents with    PDR/ME     VABYSMO OU     HPI     PDR/ME     Additional comments: VABYSMO OU           Comments    1.) + DM SINCE 1997   2.) + PDR OU/ CSME OS   Bilateral PRP   PPV OS with Dr. 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 TEARS QID OS PRN  REFRESH GEL BID OS   OMEGA E BID P.O.     Eylea OD last 4/7/22, 5/10/22  6/2/22 7/21/22  9/8/22  Eylea OS last 2/10/22  Ozurdex OS 3/26/2021 (steroid responder - IOP in 40s)          Last edited by Laya Carvalho on 11/7/2023 10:06 AM.      Problem List Items Addressed This Visit    None  Visit Diagnoses       Type 2 diabetes mellitus with both eyes affected by moderate nonproliferative retinopathy without macular edema, with long-term current use of insulin    -  Primary    Relevant Medications    faricimab-svoa 6 mg/0.05 mL (120 mg/mL) injection 6 mg (Completed)    Other Relevant Orders    Posterior Segment OCT Retina-Both eyes (Completed)    Prior authorization Order          Instructed to call 24/7 for any worsening of vision, visual distortion or pain.  Check OU  independently daily.    Gave my office and personal cell phone number.  ________________  11/7/2023 today  Mono Bergman      OU DME  OCT better OD, no improvement OS  Per patient request, will only tx OD      ..    Injection Procedure Note:    11/7/2023  Diagnosis :  OD DME  Today:   Vabysmo (faricimab-svoa) 6 mg/0.05mL (120 mg/mL) Injection , OD   Follow up: rtc 6 weeks for Vabysmo OD    Instructed to call 24/7 for any worsening of vision. Check Both eyes daily. Gave patient my home phone number.  Risks, benefits, and alternatives to treatment discussed in detail with the patient.  The patient voiced understanding and wished to proceed with the procedure.     Patient Identified and Time Out complete  Subconjunctival bleb - xylocaine with epi 2%   and Betadine.  Inject at Vabysmo (faricimab-svoa) 6 mg/0.05mL (120 mg/mL) Injection , OD 6:00 @ 3.5-4mm posterior to limbus  1 stop: no   Post Operative Dx: Same  Complications: None  Follow up as above.    =============================

## 2023-11-08 ENCOUNTER — TELEPHONE (OUTPATIENT)
Dept: INTERNAL MEDICINE | Facility: CLINIC | Age: 78
End: 2023-11-08
Payer: MEDICARE

## 2023-11-08 NOTE — TELEPHONE ENCOUNTER
----- Message from Mei Fuentes sent at 11/8/2023  2:28 PM CST -----  Contact: Patient, [phone]  Calling to speak with the nurse regarding wanting to take diet pills, is it ok. Please call him. Thanks.

## 2023-11-09 ENCOUNTER — TELEPHONE (OUTPATIENT)
Dept: INTERNAL MEDICINE | Facility: CLINIC | Age: 78
End: 2023-11-09
Payer: MEDICARE

## 2023-11-09 NOTE — TELEPHONE ENCOUNTER
----- Message from Nathalia Mart sent at 11/9/2023  1:09 PM CST -----  Patient is requesting a call back concerning the request he made yesterday. [phone]

## 2023-11-10 NOTE — TELEPHONE ENCOUNTER
Pt notified that Dr. Ash states due to his age and his other health issues she does not recommend he take any type of Diet Pills. [facility] stated that [facility] told him it was fine to take the GOLO and if that did not work he had a different one he could try. I advised the patient again of Dr. Ash's response and told him that it is up to him on what he wants to do, but Dr. Ash is a NO for Diet pills.

## 2023-11-10 NOTE — TELEPHONE ENCOUNTER
----- Message from Jessicapatrice Newsome sent at 11/10/2023 11:18 AM CST -----  Contact: Mono  Type:  Patient Returning Call    Who Called:Mono  Who Left Message for Patient:unknown  Does the patient know what this is regarding?:unknown  Would the patient rather a call back or a response via [facility]? call  Best Call Back Number:[phone]   Additional Information: Patient reports missing a call and request another call back.   Thank you,  GH

## 2023-11-10 NOTE — TELEPHONE ENCOUNTER
LM letting patient know due to his age and other health issues Dr. Ash does not recommend him taking Diet pills.

## 2023-11-13 ENCOUNTER — TELEPHONE (OUTPATIENT)
Dept: DIABETES | Facility: CLINIC | Age: 78
End: 2023-11-13
Payer: MEDICARE

## 2023-11-13 NOTE — TELEPHONE ENCOUNTER
Patient called regarding dexcom. Patient states he wants lanette 3 and if it can't be covered he wants dexcom G6 that he was on previously before; patient informed I will send message to provider, patient voiced understand.  ----- Message from Marva Hyatt sent at 11/13/2023  3:36 PM CST -----  Contact: Mono  .Patient is calling to speak with the nurse regarding questions and concerns  . Reports having questions about the dexcom  . Please give patient a call back at .[phone]

## 2023-11-13 NOTE — TELEPHONE ENCOUNTER
----- Message from Lali Bo sent at 11/13/2023  3:15 PM CST -----  Type: Patient Call Back    Who called: SELF     What is the request in detail: Asking for a call     Can the clinic reply by [facility]? NO     Would the patient rather a call back or a response via [facility]? call     Best call back number: [phone] (home)

## 2023-11-15 ENCOUNTER — TELEPHONE (OUTPATIENT)
Dept: DIABETES | Facility: CLINIC | Age: 78
End: 2023-11-15
Payer: MEDICARE

## 2023-11-15 NOTE — TELEPHONE ENCOUNTER
Patient called returned regarding dexcom. Patient didn't answer, lvm.  ----- Message from Marie Noble RD, CDE sent at 11/15/2023  9:42 AM CST -----    ----- Message -----  From: Nathalia Mart  Sent: 11/15/2023   9:24 AM CST  To: #    Patient is requesting a call back concerning his dexcom. Call back at [phone]

## 2023-11-15 NOTE — TELEPHONE ENCOUNTER
Called patient and he asked if the annetta 3 or the dexcom G6 was ordered. Informed patient that the Annetta was ordered for him at his last visit. Informed patient that and update was asked for on 11/14/2023 and [facility] has sent this to their reorder team. Patient voiced understanding

## 2023-11-15 NOTE — TELEPHONE ENCOUNTER
----- Message from Sagrario Minaya sent at 11/15/2023  3:51 PM CST -----  Contact: [phone]  Patient is returning a phone call.  Who left a message for the patient: Izzy Marrero MA  Does patient know what this is regarding:  yes   Would you like a call back, or a response through your [facility] portal?:   call back   Comments:

## 2023-11-27 ENCOUNTER — TELEPHONE (OUTPATIENT)
Dept: DIABETES | Facility: CLINIC | Age: 78
End: 2023-11-27
Payer: MEDICARE

## 2023-11-27 NOTE — TELEPHONE ENCOUNTER
----- Message from Sanket Gonzalez sent at 11/27/2023 10:16 AM CST -----  Type: Patient Call Back         Who called:Pt          What is the request in detail: Pt called in regarding concerns on dexcom G6         Can the clinic reply by [facility]?no          Would the patient rather a call back or a response via [facility]? Call back          Best call back number: [phone] (mobile)          Additional Information:           Thank You

## 2023-11-27 NOTE — TELEPHONE ENCOUNTER
Spoke with patient and he states he called [facility] about his lanette 3 order and they haven't shipped it out. He also states they told him they are having issues with his insurance.    I sent a message today to [facility] for and update on the order

## 2023-11-29 ENCOUNTER — TELEPHONE (OUTPATIENT)
Dept: DIABETES | Facility: CLINIC | Age: 78
End: 2023-11-29
Payer: MEDICARE

## 2023-11-29 NOTE — TELEPHONE ENCOUNTER
Called patient to let him know that he's not getting a dexcom 6 from the pharmacy and that he's getting a lanette 3 from [facility] and I'd reach out to them since they are saying the reason he's not getting the device is on our end     Patient voiced understanding

## 2023-11-29 NOTE — TELEPHONE ENCOUNTER
----- Message from Asiya Ramirez sent at 11/29/2023 11:11 AM CST -----  Contact: [phone]  Patient would like to consult with a nurse in regards his dextom come 6 machine. he stated that he connected the pharmacy and was told it is an issue on providers end. Please call pt back [phone], if no answer please leave message. Thanks KB

## 2023-11-30 NOTE — TELEPHONE ENCOUNTER
Called patient to let him know that his Annetta 3 order has been sent to [facility] due to his insurance Patient voiced understanding

## 2023-12-04 ENCOUNTER — LAB VISIT (OUTPATIENT)
Dept: LAB | Facility: HOSPITAL | Age: 78
End: 2023-12-04
Attending: FAMILY MEDICINE
Payer: MEDICARE

## 2023-12-04 ENCOUNTER — OFFICE VISIT (OUTPATIENT)
Dept: INTERNAL MEDICINE | Facility: CLINIC | Age: 78
End: 2023-12-04
Payer: MEDICARE

## 2023-12-04 VITALS
BODY MASS INDEX: 37.82 KG/M2 | HEART RATE: 76 BPM | SYSTOLIC BLOOD PRESSURE: 100 MMHG | DIASTOLIC BLOOD PRESSURE: 60 MMHG | WEIGHT: 249.56 LBS | HEIGHT: 68 IN | TEMPERATURE: 97 F

## 2023-12-04 DIAGNOSIS — N18.32 STAGE 3B CHRONIC KIDNEY DISEASE: ICD-10-CM

## 2023-12-04 DIAGNOSIS — E11.59 HYPERTENSION ASSOCIATED WITH DIABETES: ICD-10-CM

## 2023-12-04 DIAGNOSIS — E11.69 DYSLIPIDEMIA ASSOCIATED WITH TYPE 2 DIABETES MELLITUS: ICD-10-CM

## 2023-12-04 DIAGNOSIS — E11.65 UNCONTROLLED TYPE 2 DIABETES MELLITUS WITH HYPERGLYCEMIA: ICD-10-CM

## 2023-12-04 DIAGNOSIS — E11.65 UNCONTROLLED TYPE 2 DIABETES MELLITUS WITH HYPERGLYCEMIA: Primary | ICD-10-CM

## 2023-12-04 DIAGNOSIS — I15.2 HYPERTENSION ASSOCIATED WITH DIABETES: ICD-10-CM

## 2023-12-04 DIAGNOSIS — E78.5 DYSLIPIDEMIA ASSOCIATED WITH TYPE 2 DIABETES MELLITUS: ICD-10-CM

## 2023-12-04 PROBLEM — J96.01 ACUTE HYPOXEMIC RESPIRATORY FAILURE: Status: RESOLVED | Noted: 2023-08-31 | Resolved: 2023-12-04

## 2023-12-04 LAB
ALBUMIN SERPL BCP-MCNC: 3.4 G/DL (ref 3.5–5.2)
ALP SERPL-CCNC: 185 U/L (ref 55–135)
ALT SERPL W/O P-5'-P-CCNC: 21 U/L (ref 10–44)
ANION GAP SERPL CALC-SCNC: 11 MMOL/L (ref 8–16)
AST SERPL-CCNC: 21 U/L (ref 10–40)
BILIRUB SERPL-MCNC: 0.6 MG/DL (ref 0.1–1)
BUN SERPL-MCNC: 29 MG/DL (ref 8–23)
CALCIUM SERPL-MCNC: 8.5 MG/DL (ref 8.7–10.5)
CHLORIDE SERPL-SCNC: 108 MMOL/L (ref 95–110)
CO2 SERPL-SCNC: 20 MMOL/L (ref 23–29)
CREAT SERPL-MCNC: 1.9 MG/DL (ref 0.5–1.4)
EST. GFR  (NO RACE VARIABLE): 36.1 ML/MIN/1.73 M^2
ESTIMATED AVG GLUCOSE: 197 MG/DL (ref 68–131)
GLUCOSE SERPL-MCNC: 214 MG/DL (ref 70–110)
HBA1C MFR BLD: 8.5 % (ref 4–5.6)
POTASSIUM SERPL-SCNC: 4.3 MMOL/L (ref 3.5–5.1)
PROT SERPL-MCNC: 7.2 G/DL (ref 6–8.4)
SODIUM SERPL-SCNC: 139 MMOL/L (ref 136–145)

## 2023-12-04 PROCEDURE — 83036 HEMOGLOBIN GLYCOSYLATED A1C: CPT | Performed by: FAMILY MEDICINE

## 2023-12-04 PROCEDURE — 1126F PR PAIN SEVERITY QUANTIFIED, NO PAIN PRESENT: ICD-10-PCS | Mod: CPTII,S$GLB,, | Performed by: FAMILY MEDICINE

## 2023-12-04 PROCEDURE — 3074F PR MOST RECENT SYSTOLIC BLOOD PRESSURE < 130 MM HG: ICD-10-PCS | Mod: CPTII,S$GLB,, | Performed by: FAMILY MEDICINE

## 2023-12-04 PROCEDURE — 3078F DIAST BP <80 MM HG: CPT | Mod: CPTII,S$GLB,, | Performed by: FAMILY MEDICINE

## 2023-12-04 PROCEDURE — 1159F PR MEDICATION LIST DOCUMENTED IN MEDICAL RECORD: ICD-10-PCS | Mod: CPTII,S$GLB,, | Performed by: FAMILY MEDICINE

## 2023-12-04 PROCEDURE — 3288F PR FALLS RISK ASSESSMENT DOCUMENTED: ICD-10-PCS | Mod: CPTII,S$GLB,, | Performed by: FAMILY MEDICINE

## 2023-12-04 PROCEDURE — 99214 PR OFFICE/OUTPT VISIT, EST, LEVL IV, 30-39 MIN: ICD-10-PCS | Mod: S$GLB,,, | Performed by: FAMILY MEDICINE

## 2023-12-04 PROCEDURE — 1101F PR PT FALLS ASSESS DOC 0-1 FALLS W/OUT INJ PAST YR: ICD-10-PCS | Mod: CPTII,S$GLB,, | Performed by: FAMILY MEDICINE

## 2023-12-04 PROCEDURE — 1126F AMNT PAIN NOTED NONE PRSNT: CPT | Mod: CPTII,S$GLB,, | Performed by: FAMILY MEDICINE

## 2023-12-04 PROCEDURE — 36415 COLL VENOUS BLD VENIPUNCTURE: CPT | Mod: PO | Performed by: FAMILY MEDICINE

## 2023-12-04 PROCEDURE — 3078F PR MOST RECENT DIASTOLIC BLOOD PRESSURE < 80 MM HG: ICD-10-PCS | Mod: CPTII,S$GLB,, | Performed by: FAMILY MEDICINE

## 2023-12-04 PROCEDURE — 99214 OFFICE O/P EST MOD 30 MIN: CPT | Mod: S$GLB,,, | Performed by: FAMILY MEDICINE

## 2023-12-04 PROCEDURE — 1159F MED LIST DOCD IN RCRD: CPT | Mod: CPTII,S$GLB,, | Performed by: FAMILY MEDICINE

## 2023-12-04 PROCEDURE — 80053 COMPREHEN METABOLIC PANEL: CPT | Performed by: FAMILY MEDICINE

## 2023-12-04 PROCEDURE — 1160F PR REVIEW ALL MEDS BY PRESCRIBER/CLIN PHARMACIST DOCUMENTED: ICD-10-PCS | Mod: CPTII,S$GLB,, | Performed by: FAMILY MEDICINE

## 2023-12-04 PROCEDURE — 1101F PT FALLS ASSESS-DOCD LE1/YR: CPT | Mod: CPTII,S$GLB,, | Performed by: FAMILY MEDICINE

## 2023-12-04 PROCEDURE — 99999 PR PBB SHADOW E&M-EST. PATIENT-LVL IV: CPT | Mod: PBBFAC,,, | Performed by: FAMILY MEDICINE

## 2023-12-04 PROCEDURE — 1160F RVW MEDS BY RX/DR IN RCRD: CPT | Mod: CPTII,S$GLB,, | Performed by: FAMILY MEDICINE

## 2023-12-04 PROCEDURE — 99999 PR PBB SHADOW E&M-EST. PATIENT-LVL IV: ICD-10-PCS | Mod: PBBFAC,,, | Performed by: FAMILY MEDICINE

## 2023-12-04 PROCEDURE — 3074F SYST BP LT 130 MM HG: CPT | Mod: CPTII,S$GLB,, | Performed by: FAMILY MEDICINE

## 2023-12-04 PROCEDURE — 3288F FALL RISK ASSESSMENT DOCD: CPT | Mod: CPTII,S$GLB,, | Performed by: FAMILY MEDICINE

## 2023-12-04 RX ORDER — CITALOPRAM 40 MG/1
20 TABLET, FILM COATED ORAL DAILY
COMMUNITY
Start: 2023-11-28

## 2023-12-04 RX ORDER — GUAIFENESIN 600 MG/1
600 TABLET, EXTENDED RELEASE ORAL 2 TIMES DAILY
COMMUNITY
Start: 2023-11-28

## 2023-12-04 NOTE — PROGRESS NOTES
"Subjective:      Patient ID: Mono Bergman is a 78 y.o. male.    Chief Complaint: Diabetes    HPI  77 yo here for fu on chronic conditions.  He is getting meds in  [facility]//unhappy with some of the rules that are in place there and services.  He is exploring some options.  [facility]  Using [facility]    Past Medical History:   Diagnosis Date    Arthritis     Cataract     Colon polyps 10/29/2013    Depression, recurrent 9/11/2023    Diabetes mellitus type II     Diabetic retinopathy     GERD (gastroesophageal reflux disease) 07/18/2013    Hyperlipidemia LDL goal < 70 07/18/2013    Hypertension     Low BP    JOEL on CPAP     Sleep apnea, unspecified     Uveitis 05/11/2016     Family History   Problem Relation Age of Onset    Heart disease Mother     Thyroid cancer Maternal Grandmother     Colon cancer Neg Hx      Past Surgical History:   Procedure Laterality Date    CATARACT EXTRACTION W/  INTRAOCULAR LENS IMPLANT Left 12/10/14    CATARACT EXTRACTION W/  INTRAOCULAR LENS IMPLANT Right 07/13/2016    CHOLECYSTECTOMY      COLONOSCOPY N/A 2/3/2017    Procedure: COLONOSCOPY;  Surgeon: Natan Magdaleno MD;  Location: [facility];  Service: Endoscopy;  Laterality: N/A;    EYE SURGERY      KNEE SURGERY  1971    left knee    rk rt. eye       Social History     Tobacco Use    Smoking status: Never     Passive exposure: Never    Smokeless tobacco: Never   Substance Use Topics    Alcohol use: No    Drug use: No       /60   Pulse 76   Temp 97.2 °F (36.2 °C) (Tympanic)   Ht 5' 8" (1.727 m)   Wt 113.2 kg (249 lb 9 oz)   BMI 37.95 kg/m²     Review of Systems   Respiratory:  Negative for shortness of breath.    Cardiovascular:  Negative for chest pain.       Objective:     Physical Exam  Vitals and nursing note reviewed.   Constitutional:       General: He is not in acute distress.     Appearance: He is well-developed. He is not diaphoretic.   HENT:      Head: Normocephalic and " atraumatic.   Eyes:      General:         Right eye: No discharge.         Left eye: No discharge.   Cardiovascular:      Rate and Rhythm: Normal rate and regular rhythm.   Pulmonary:      Effort: Pulmonary effort is normal. No respiratory distress.   Neurological:      Mental Status: He is alert and oriented to person, place, and time.   Psychiatric:         Mood and Affect: Mood normal.         Behavior: Behavior normal.         Thought Content: Thought content normal.         Judgment: Judgment normal.         Lab Results   Component Value Date    WBC 8.97 09/01/2023    HGB 10.9 (L) 09/01/2023    HCT 33.1 (L) 09/01/2023     09/01/2023    CHOL 125 05/22/2023    TRIG 119 05/22/2023    HDL 32 (L) 05/22/2023    ALT 34 09/01/2023    AST 30 09/01/2023     09/01/2023    K 3.9 09/01/2023     09/01/2023    CREATININE 1.8 (H) 09/01/2023    BUN 19 09/01/2023    CO2 25 09/01/2023    TSH 2.436 05/22/2023    PSA 2.8 05/22/2023    INR 1.0 05/01/2017    HGBA1C 8.0 (H) 08/31/2023       Assessment:     1. Uncontrolled type 2 diabetes mellitus with hyperglycemia    2. Hypertension associated with diabetes    3. Dyslipidemia associated with type 2 diabetes mellitus    4. Stage 3b chronic kidney disease         Plan:     Uncontrolled type 2 diabetes mellitus with hyperglycemia  -     Hemoglobin A1C; Future; Expected date: 12/04/2023  -     Microalbumin/Creatinine Ratio, Urine; Future; Expected date: 12/04/2023    Hypertension associated with diabetes    Dyslipidemia associated with type 2 diabetes mellitus    Stage 3b chronic kidney disease      Update labs  Bp stable/cont meds  Check CKD status//avoid NSAIDs  Cont statin  [facility] per [facility]  Fu PRN//may need to establish with new PCP

## 2023-12-05 ENCOUNTER — LAB VISIT (OUTPATIENT)
Dept: LAB | Facility: HOSPITAL | Age: 78
End: 2023-12-05
Attending: FAMILY MEDICINE
Payer: MEDICARE

## 2023-12-05 DIAGNOSIS — E11.65 UNCONTROLLED TYPE 2 DIABETES MELLITUS WITH HYPERGLYCEMIA: ICD-10-CM

## 2023-12-05 LAB
ALBUMIN/CREAT UR: 525 UG/MG (ref 0–30)
CREAT UR-MCNC: 116 MG/DL (ref 23–375)
MICROALBUMIN UR DL<=1MG/L-MCNC: 609 UG/ML

## 2023-12-05 PROCEDURE — 82043 UR ALBUMIN QUANTITATIVE: CPT | Performed by: FAMILY MEDICINE

## 2023-12-06 ENCOUNTER — TELEPHONE (OUTPATIENT)
Dept: DIABETES | Facility: CLINIC | Age: 78
End: 2023-12-06
Payer: MEDICARE

## 2023-12-06 DIAGNOSIS — E11.65 UNCONTROLLED TYPE 2 DIABETES MELLITUS WITH HYPERGLYCEMIA, WITH LONG-TERM CURRENT USE OF INSULIN: Primary | ICD-10-CM

## 2023-12-06 DIAGNOSIS — Z79.4 UNCONTROLLED TYPE 2 DIABETES MELLITUS WITH HYPERGLYCEMIA, WITH LONG-TERM CURRENT USE OF INSULIN: Primary | ICD-10-CM

## 2023-12-06 NOTE — TELEPHONE ENCOUNTER
Pt is currently using the g7 he wants to go back to the g6 can you send a prescription for the sensors and transmitters signed so that I can send that along with the chart note and peoples health form over to [facility].

## 2023-12-06 NOTE — TELEPHONE ENCOUNTER
----- Message from Beatrice Jarad sent at 12/6/2023 10:45 AM CST -----  Contact: pt  Pt is calling in regard to needing the nurse to call him about a bill he rec'd.  (Advised could transfer to billing, but he still wants someone from this office to call him)  call at [phone]

## 2023-12-06 NOTE — TELEPHONE ENCOUNTER
Pt called to tell the office he got a $750 bill in the mail about the dexcom g7. His insurance company told him he got that bill because the doctor did not request for insurance to pay for his g7.He said [facility] would have to [facility] to get his g7 [facility]. [facility] to [facility] after he had already spoke with [facility] and voiced understanding that we ordered him a lanette 3 because insurance wont cover the g7 through [facility] medical we sent the lanette 3 to [facility]. I then asked the pt where is he getting his g7 sensors pt said he will call us back when he figures that out.

## 2023-12-07 RX ORDER — BLOOD-GLUCOSE SENSOR
EACH MISCELLANEOUS
Qty: 9 EACH | Refills: 3 | Status: SHIPPED | OUTPATIENT
Start: 2023-12-07

## 2023-12-07 RX ORDER — BLOOD-GLUCOSE TRANSMITTER
EACH MISCELLANEOUS
Qty: 1 EACH | Refills: 3 | Status: SHIPPED | OUTPATIENT
Start: 2023-12-07

## 2023-12-08 ENCOUNTER — TELEPHONE (OUTPATIENT)
Dept: DIABETES | Facility: CLINIC | Age: 78
End: 2023-12-08
Payer: MEDICARE

## 2023-12-08 NOTE — TELEPHONE ENCOUNTER
Medical necessity form chart note and prescription sent to [facility] for dexcom g6 transmitters and sensors.

## 2023-12-11 DIAGNOSIS — E11.65 UNCONTROLLED TYPE 2 DIABETES MELLITUS WITH HYPERGLYCEMIA, WITH LONG-TERM CURRENT USE OF INSULIN: Primary | ICD-10-CM

## 2023-12-11 DIAGNOSIS — Z79.4 UNCONTROLLED TYPE 2 DIABETES MELLITUS WITH HYPERGLYCEMIA, WITH LONG-TERM CURRENT USE OF INSULIN: Primary | ICD-10-CM

## 2023-12-11 RX ORDER — BLOOD-GLUCOSE,RECEIVER,CONT
EACH MISCELLANEOUS
Qty: 1 EACH | Refills: 0 | Status: SHIPPED | OUTPATIENT
Start: 2023-12-11

## 2023-12-15 ENCOUNTER — TELEPHONE (OUTPATIENT)
Dept: DIABETES | Facility: CLINIC | Age: 78
End: 2023-12-15
Payer: MEDICARE

## 2023-12-15 NOTE — TELEPHONE ENCOUNTER
----- Message from Donny Woodward sent at 12/15/2023  2:19 PM CST -----  Contact: Patient  Type:  Patient Returning Call    Who Called:Mono Bergman   Who Left Message for Patient: nurse   Does the patient know what this is regarding?: yes   Would the patient rather a call back or a response via [facility]?  Call back   Best Call Back Number: [phone]   Additional Information:

## 2023-12-15 NOTE — TELEPHONE ENCOUNTER
Spoke with patient and he states the he received a bill for $750 from [facility]. Informed patient that he needs to call [facility] to return dexcom g7 so he won't have to pay the bill. Patient voiced understanding     KELLEN Quiñonez

## 2023-12-19 ENCOUNTER — PROCEDURE VISIT (OUTPATIENT)
Dept: OPHTHALMOLOGY | Facility: CLINIC | Age: 78
End: 2023-12-19
Payer: MEDICARE

## 2023-12-19 DIAGNOSIS — H40.62X4 GLAUCOMA OF LEFT EYE SECONDARY TO DRUGS, INDETERMINATE STAGE: ICD-10-CM

## 2023-12-19 DIAGNOSIS — E11.3393 TYPE 2 DIABETES MELLITUS WITH BOTH EYES AFFECTED BY MODERATE NONPROLIFERATIVE RETINOPATHY WITHOUT MACULAR EDEMA, WITH LONG-TERM CURRENT USE OF INSULIN: Primary | ICD-10-CM

## 2023-12-19 DIAGNOSIS — Z79.4 TYPE 2 DIABETES MELLITUS WITH BOTH EYES AFFECTED BY MODERATE NONPROLIFERATIVE RETINOPATHY WITHOUT MACULAR EDEMA, WITH LONG-TERM CURRENT USE OF INSULIN: Primary | ICD-10-CM

## 2023-12-19 PROCEDURE — 92134 POSTERIOR SEGMENT OCT RETINA (OCULAR COHERENCE TOMOGRAPHY)-BOTH EYES: ICD-10-PCS | Mod: S$GLB,,, | Performed by: OPHTHALMOLOGY

## 2023-12-19 PROCEDURE — 92134 CPTRZ OPH DX IMG PST SGM RTA: CPT | Mod: S$GLB,,, | Performed by: OPHTHALMOLOGY

## 2023-12-19 PROCEDURE — 67028 PR INJECT INTRAVITREAL PHARMCOLOGIC: ICD-10-PCS | Mod: RT,S$GLB,, | Performed by: OPHTHALMOLOGY

## 2023-12-19 PROCEDURE — 67028 INJECTION EYE DRUG: CPT | Mod: RT,S$GLB,, | Performed by: OPHTHALMOLOGY

## 2023-12-19 PROCEDURE — 99214 PR OFFICE/OUTPT VISIT, EST, LEVL IV, 30-39 MIN: ICD-10-PCS | Mod: 25,S$GLB,, | Performed by: OPHTHALMOLOGY

## 2023-12-19 PROCEDURE — 99214 OFFICE O/P EST MOD 30 MIN: CPT | Mod: 25,S$GLB,, | Performed by: OPHTHALMOLOGY

## 2023-12-19 RX ORDER — POVIDONE 2.5 MG/.5G
1 SOLUTION, GEL FORMING / DROPS OPHTHALMIC 3 TIMES DAILY
Qty: 30 EACH | Refills: 11 | Status: SHIPPED | OUTPATIENT
Start: 2023-12-19

## 2023-12-19 RX ORDER — DORZOLAMIDE HYDROCHLORIDE AND TIMOLOL MALEATE 20; 5 MG/ML; MG/ML
1 SOLUTION/ DROPS OPHTHALMIC 2 TIMES DAILY
Qty: 10 ML | Refills: 4 | Status: SHIPPED | OUTPATIENT
Start: 2023-12-19 | End: 2024-12-18

## 2023-12-19 NOTE — PROGRESS NOTES
===============================  Date today is 12/19/2023  Mono Bergman is a 78 y.o. male  Last visit [facility]: :11/7/2023   Last visit eye dept. 11/7/2023    Uncorrected distance visual acuity was 20/200 in the right eye and 20/200 in the left eye.  Tonometry       Tonometry (Applanation, 10:21 AM)         Right Left    Pressure 16 24              Max Pressure         Right Left    Max 22 (11/7/2023) 31 (7/27/2021)                  Not recorded       Not recorded       Not recorded       Chief Complaint   Patient presents with    Injections     Vab OD        Problem List Items Addressed This Visit    None  Visit Diagnoses       Type 2 diabetes mellitus with both eyes affected by moderate nonproliferative retinopathy without macular edema, with long-term current use of insulin    -  Primary    Relevant Medications    faricimab-svoa 6 mg/0.05 mL (120 mg/mL) injection 6 mg (Completed)    Other Relevant Orders    Posterior Segment OCT Retina-Both eyes (Completed)    Prior authorization Order    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  Check OU independently daily.    Gave my office and personal cell phone number.  ________________  12/19/2023 today  Mono Bergman          Od no dme looks great    Os increased me /schisis trd os   No change in acuity   Coag on drops- OU drops BID but uncertain of name, for now use Cosopt BID OU only  PPV os dr pace   Od dme great response to vabysmo last visit  Ozurdex t 40 ..  Have been treating ou    Today OS worse   Rec vabysmo ou   Pt request to work on OD first   Txx to maintin at plateau OD, then start OS tx  PCIOL OU  Post PRP OU  Disc pallor OS  D/c lotemax  OS inferior arcade ghost vessel w/traction- not TRD, just focal edema      ..    Injection  Procedure Note:    12/19/2023  Diagnosis :  OD dme   Today:   Vabysmo (faricimab-svoa) 6 mg/0.05mL (120 mg/mL) Injection , OD   Follow up: rtc 4-6 weeks  txx OD      Instructed to call 24/7 for any worsening of vision. Check Both eyes daily. Gave patient my home phone number.  Risks, benefits, and alternatives to treatment discussed in detail with the patient.  The patient voiced understanding and wished to proceed with the procedure.     Patient Identified and Time Out complete  Subconjunctival bleb - xylocaine with epi 2%   and Betadine.  Inject at Vabysmo (faricimab-svoa) 6 mg/0.05mL (120 mg/mL) Injection , OD 6:00 @ 3.5-4mm posterior to limbus  1 stop: no   Post Operative Dx: Same  Complications: None  Follow up as above.    =============================

## 2024-01-03 ENCOUNTER — TELEPHONE (OUTPATIENT)
Dept: INTERNAL MEDICINE | Facility: CLINIC | Age: 79
End: 2024-01-03
Payer: MEDICARE

## 2024-01-03 NOTE — TELEPHONE ENCOUNTER
Spoke with Nidia patient is stating 83 with his O2. Nidia was told with an O2 level stating 76-83 and is not on O2 he will need to go back to the ER and be checked. Nurse stated that he was breathing fast and she thinks he has COPD or possibly fluid on his lungs. Since he is in assistant living he has no one else to care for him. [facility] released him yesterday with no O2, no Home Health and also not follow up with Pulmonary. He needs to go back to the ER and be re-seen. Is they did not like [place] they can go to any Hospital they want to go to.

## 2024-01-03 NOTE — TELEPHONE ENCOUNTER
----- Message from Cinthia Mejia LPN sent at 1/3/2024 12:38 PM CST -----  Contact: tiffani    ----- Message -----  From: Matthew Goncalves  Sent: 1/3/2024  12:22 PM CST  To: [facility] Clinical Staff    Tiffani is requesting a call to schedule patient a hospital follow up. He was released on yesterday but she stated he needs to be seen soon due to oxygen levels. Please call her back at [phone].          Thanks  DD

## 2024-01-03 NOTE — TELEPHONE ENCOUNTER
----- Message from Matthew Goncalves sent at 1/3/2024 12:20 PM CST -----  Contact: tiffani Jacob is requesting a call to schedule patient a hospital follow up. He was released on yesterday but she stated he needs to be seen soon due to oxygen levels. Please call her back at [phone].          Thanks  DD

## 2024-01-05 ENCOUNTER — TELEPHONE (OUTPATIENT)
Dept: OPHTHALMOLOGY | Facility: CLINIC | Age: 79
End: 2024-01-05
Payer: MEDICARE

## 2024-01-05 NOTE — TELEPHONE ENCOUNTER
Called ms. Stout back with no answer.  I left her a detailed voicemail for Mr. Bergman, that he was given a hand written prescription for his drops and he was suppose to give it to [facility].  kf

## 2024-01-09 ENCOUNTER — TELEPHONE (OUTPATIENT)
Dept: INTERNAL MEDICINE | Facility: CLINIC | Age: 79
End: 2024-01-09
Payer: MEDICARE

## 2024-01-09 NOTE — TELEPHONE ENCOUNTER
Pt arrived at [facility] for a hospital Followup pt's nurse called earlier to say he is losing a lot of blood was found in his bed and he is very SOB. As I went out to the Pts car and asked him about his SOB and bleeding he agreed that he is having these symptoms and asked for the address to the nearest ER. I( BERNARDO ZAVALETA ) put in the address for  [facility] [address] due to patient asking.

## 2024-01-09 NOTE — TELEPHONE ENCOUNTER
Argelia is the nurse for [facility] [employer] and states after he left to go to his appointments she found blood in his bed and also trailing to the bathroom. She also states he had a lot of diarrhea on yesterday and thinks he now has rectal bleeding.

## 2024-01-09 NOTE — TELEPHONE ENCOUNTER
----- Message from Julienne Alegre sent at 1/9/2024  9:02 AM CST -----  Who Called: Elier Ham    What is the request in detail: Requesting call back to discuss blood on carpet . Please advise    Can the clinic reply by [facility]? No    Best Call Back Number: [phone]    Additional Information: